# Patient Record
Sex: FEMALE | Race: BLACK OR AFRICAN AMERICAN | NOT HISPANIC OR LATINO | Employment: FULL TIME | URBAN - METROPOLITAN AREA
[De-identification: names, ages, dates, MRNs, and addresses within clinical notes are randomized per-mention and may not be internally consistent; named-entity substitution may affect disease eponyms.]

---

## 2017-06-28 DIAGNOSIS — Z12.11 COLON CANCER SCREENING: ICD-10-CM

## 2018-06-22 ENCOUNTER — OFFICE VISIT (OUTPATIENT)
Dept: INTERNAL MEDICINE | Facility: CLINIC | Age: 63
End: 2018-06-22
Payer: COMMERCIAL

## 2018-06-22 VITALS
HEART RATE: 66 BPM | OXYGEN SATURATION: 97 % | BODY MASS INDEX: 26.74 KG/M2 | HEIGHT: 65 IN | SYSTOLIC BLOOD PRESSURE: 122 MMHG | WEIGHT: 160.5 LBS | DIASTOLIC BLOOD PRESSURE: 78 MMHG

## 2018-06-22 DIAGNOSIS — L50.9 URTICARIA: ICD-10-CM

## 2018-06-22 PROCEDURE — 3008F BODY MASS INDEX DOCD: CPT | Mod: CPTII,S$GLB,, | Performed by: NURSE PRACTITIONER

## 2018-06-22 PROCEDURE — 99213 OFFICE O/P EST LOW 20 MIN: CPT | Mod: 25,S$GLB,, | Performed by: NURSE PRACTITIONER

## 2018-06-22 PROCEDURE — 99999 PR PBB SHADOW E&M-EST. PATIENT-LVL III: CPT | Mod: PBBFAC,,, | Performed by: NURSE PRACTITIONER

## 2018-06-22 PROCEDURE — 96372 THER/PROPH/DIAG INJ SC/IM: CPT | Mod: S$GLB,,, | Performed by: NURSE PRACTITIONER

## 2018-06-22 RX ORDER — BETAMETHASONE SODIUM PHOSPHATE AND BETAMETHASONE ACETATE 3; 3 MG/ML; MG/ML
6 INJECTION, SUSPENSION INTRA-ARTICULAR; INTRALESIONAL; INTRAMUSCULAR; SOFT TISSUE
Status: COMPLETED | OUTPATIENT
Start: 2018-06-22 | End: 2018-06-22

## 2018-06-22 RX ORDER — TRIAMCINOLONE ACETONIDE 1 MG/G
CREAM TOPICAL 2 TIMES DAILY
Qty: 80 G | Refills: 0 | Status: SHIPPED | OUTPATIENT
Start: 2018-06-22 | End: 2019-10-08

## 2018-06-22 RX ADMIN — BETAMETHASONE SODIUM PHOSPHATE AND BETAMETHASONE ACETATE 6 MG: 3; 3 INJECTION, SUSPENSION INTRA-ARTICULAR; INTRALESIONAL; INTRAMUSCULAR; SOFT TISSUE at 08:06

## 2018-06-22 NOTE — PROGRESS NOTES
"INTERNAL MEDICINE PROGRESS/URGENT CARE NOTE    CHIEF COMPLAINT     Chief Complaint   Patient presents with    Rash     x3 days        HPI     Liz Cheryl Coto is a 62 y.o. female who presents for an urgent visit today.    Rash to the bilateral arms, thorax, upper back, legs and neck x 3 days. Used new detergent pods. No new medicines. No new foods. No recent travel.     Rash is mildly itchy. Irritated by clothing. Treated with cortisone cream.       Past Medical History:  History reviewed. No pertinent past medical history.    Home Medications:  Prior to Admission medications    Medication Sig Start Date End Date Taking? Authorizing Provider   levocetirizine (XYZAL) 5 MG tablet Take 1 tablet (5 mg total) by mouth every evening. For Allergies. 6/28/16  Yes Griselda Alegre MD   peg-electrolyte soln (TRILYTE WITH FLAVOR PACKETS) 420 gram SolR Take 4,000 mLs by mouth as directed. 6/29/16   Moses Sibley MD       Review of Systems:  Review of Systems   Constitutional: Negative for chills and fever.   Respiratory: Negative for cough, chest tightness, shortness of breath and wheezing.    Cardiovascular: Negative for chest pain and palpitations.   Skin: Positive for rash.   Neurological: Negative for dizziness, light-headedness and headaches.       Health Maintainence:   Immunizations:  Health Maintenance       Date Due Completion Date    TETANUS VACCINE 08/10/1975 ---    Colonoscopy 08/10/2007 ---    Zoster Vaccine 08/10/2017 ---    Influenza Vaccine 08/01/2018 ---    Mammogram 08/23/2018 8/23/2016    Pap Smear with HPV Cotest 08/22/2019 8/22/2016    Lipid Panel 06/28/2021 6/28/2016           PHYSICAL EXAM     /78 (BP Location: Left arm, Patient Position: Sitting, BP Method: Large (Manual))   Pulse 66   Ht 5' 5" (1.651 m)   Wt 72.8 kg (160 lb 7.9 oz)   SpO2 97%   BMI 26.71 kg/m²     Physical Exam   Constitutional: She is oriented to person, place, and time. She appears well-developed and " well-nourished.   HENT:   Head: Normocephalic and atraumatic.   Eyes: Pupils are equal, round, and reactive to light.   Cardiovascular: Normal rate and regular rhythm.    Pulmonary/Chest: Effort normal.   Neurological: She is alert and oriented to person, place, and time.   Skin: Skin is warm and dry. Capillary refill takes less than 2 seconds. Rash noted. Rash is urticarial.        Psychiatric: She has a normal mood and affect.       LABS     No results found for: LABA1C, HGBA1C  CMP  Sodium   Date Value Ref Range Status   06/28/2016 142 136 - 145 mmol/L Final     Potassium   Date Value Ref Range Status   06/28/2016 3.9 3.5 - 5.1 mmol/L Final     Chloride   Date Value Ref Range Status   06/28/2016 104 95 - 110 mmol/L Final     CO2   Date Value Ref Range Status   06/28/2016 28 23 - 29 mmol/L Final     Glucose   Date Value Ref Range Status   06/28/2016 84 70 - 110 mg/dL Final     BUN, Bld   Date Value Ref Range Status   06/28/2016 10 6 - 20 mg/dL Final     Creatinine   Date Value Ref Range Status   06/28/2016 1.0 0.5 - 1.4 mg/dL Final     Calcium   Date Value Ref Range Status   06/28/2016 9.6 8.7 - 10.5 mg/dL Final     Total Protein   Date Value Ref Range Status   06/28/2016 6.8 6.0 - 8.4 g/dL Final     Albumin   Date Value Ref Range Status   06/28/2016 3.5 3.5 - 5.2 g/dL Final     Total Bilirubin   Date Value Ref Range Status   06/28/2016 0.4 0.1 - 1.0 mg/dL Final     Comment:     For infants and newborns, interpretation of results should be based  on gestational age, weight and in agreement with clinical  observations.  Premature Infant recommended reference ranges:  Up to 24 hours.............<8.0 mg/dL  Up to 48 hours............<12.0 mg/dL  3-5 days..................<15.0 mg/dL  6-29 days.................<15.0 mg/dL       Alkaline Phosphatase   Date Value Ref Range Status   06/28/2016 66 55 - 135 U/L Final     AST   Date Value Ref Range Status   06/28/2016 18 10 - 40 U/L Final     ALT   Date Value Ref Range  Status   06/28/2016 9 (L) 10 - 44 U/L Final     Anion Gap   Date Value Ref Range Status   06/28/2016 10 8 - 16 mmol/L Final     eGFR if    Date Value Ref Range Status   06/28/2016 >60.0 >60 mL/min/1.73 m^2 Final     eGFR if non    Date Value Ref Range Status   06/28/2016 >60.0 >60 mL/min/1.73 m^2 Final     Comment:     Calculation used to obtain the estimated glomerular filtration  rate (eGFR) is the CKD-EPI equation. Since race is unknown   in our information system, the eGFR values for   -American and Non--American patients are given   for each creatinine result.       Lab Results   Component Value Date    WBC 5.22 06/28/2016    HGB 13.4 06/28/2016    HCT 39.9 06/28/2016    MCV 90 06/28/2016     06/28/2016     Lab Results   Component Value Date    CHOL 239 (H) 06/28/2016     Lab Results   Component Value Date    HDL 71 06/28/2016     Lab Results   Component Value Date    LDLCALC 154.8 06/28/2016     Lab Results   Component Value Date    TRIG 66 06/28/2016     Lab Results   Component Value Date    CHOLHDL 29.7 06/28/2016     No results found for: TSH, U5QPZZS, K0TYFMM, THYROIDAB    ASSESSMENT/PLAN     Liz Cheryl Coto is a 62 y.o. female with  History reviewed. No pertinent past medical history.    Urticaria- steroid shot IM in office. May use antihistamines as needed for itching and rash. Steroid cream to the rash BID. Avoid detergents   -     betamethasone acetate-betamethasone sodium phosphate injection 6 mg; Inject 1 mL (6 mg total) into the muscle one time.  -     triamcinolone acetonide 0.1% (KENALOG) 0.1 % cream; Apply topically 2 (two) times daily. for 10 days  Dispense: 80 g; Refill: 0        Follow up as needed and with PCP     Patient education provided from Main. Patient was counseled on when and how to seek emergent care.       Tania PATTERSON, APRN, FNP-c   Department of Internal Medicine - [facility]  8:23 AM

## 2019-10-04 ENCOUNTER — HOSPITAL ENCOUNTER (EMERGENCY)
Facility: HOSPITAL | Age: 64
Discharge: HOME | End: 2019-10-05
Attending: INTERNAL MEDICINE
Payer: COMMERCIAL

## 2019-10-04 DIAGNOSIS — R19.00 PELVIC MASS IN FEMALE: ICD-10-CM

## 2019-10-04 DIAGNOSIS — A08.4 VIRAL GASTROENTERITIS: Primary | ICD-10-CM

## 2019-10-04 LAB
ALBUMIN SERPL-MCNC: 3.4 G/DL (ref 3.3–5.5)
ALBUMIN SERPL-MCNC: 3.6 G/DL (ref 3.3–5.5)
ALP SERPL-CCNC: 68 U/L (ref 42–141)
ALP SERPL-CCNC: 71 U/L (ref 42–141)
BILIRUB SERPL-MCNC: 0.9 MG/DL (ref 0.2–1.6)
BILIRUB SERPL-MCNC: 1 MG/DL (ref 0.2–1.6)
BUN SERPL-MCNC: 11 MG/DL (ref 7–22)
CALCIUM SERPL-MCNC: 9.3 MG/DL (ref 8–10.3)
CHLORIDE SERPL-SCNC: 105 MMOL/L (ref 98–108)
CREAT SERPL-MCNC: 0.8 MG/DL (ref 0.6–1.2)
GLUCOSE SERPL-MCNC: 101 MG/DL (ref 73–118)
POC ALT (SGPT): 12 U/L (ref 10–47)
POC ALT (SGPT): 13 U/L (ref 10–47)
POC AMYLASE: 47 U/L (ref 14–97)
POC AST (SGOT): 22 U/L (ref 11–38)
POC AST (SGOT): 23 U/L (ref 11–38)
POC GGT: 11 U/L (ref 5–65)
POC TCO2: 30 MMOL/L (ref 18–33)
POTASSIUM BLD-SCNC: 3.6 MMOL/L (ref 3.6–5.1)
PROTEIN, POC: 6.6 G/DL (ref 6.4–8.1)
PROTEIN, POC: 6.7 G/DL (ref 6.4–8.1)
SODIUM BLD-SCNC: 139 MMOL/L (ref 128–145)

## 2019-10-04 PROCEDURE — 96361 HYDRATE IV INFUSION ADD-ON: CPT | Mod: ER

## 2019-10-04 PROCEDURE — 99285 EMERGENCY DEPT VISIT HI MDM: CPT | Mod: 25,ER

## 2019-10-04 PROCEDURE — 96374 THER/PROPH/DIAG INJ IV PUSH: CPT | Mod: ER

## 2019-10-04 PROCEDURE — 96375 TX/PRO/DX INJ NEW DRUG ADDON: CPT | Mod: ER

## 2019-10-04 PROCEDURE — 63600175 PHARM REV CODE 636 W HCPCS: Mod: ER | Performed by: INTERNAL MEDICINE

## 2019-10-04 PROCEDURE — 25000003 PHARM REV CODE 250: Mod: ER | Performed by: INTERNAL MEDICINE

## 2019-10-04 PROCEDURE — S0028 INJECTION, FAMOTIDINE, 20 MG: HCPCS | Mod: ER | Performed by: INTERNAL MEDICINE

## 2019-10-04 RX ORDER — FAMOTIDINE 10 MG/ML
20 INJECTION INTRAVENOUS
Status: COMPLETED | OUTPATIENT
Start: 2019-10-04 | End: 2019-10-04

## 2019-10-04 RX ORDER — ONDANSETRON 2 MG/ML
8 INJECTION INTRAMUSCULAR; INTRAVENOUS
Status: COMPLETED | OUTPATIENT
Start: 2019-10-04 | End: 2019-10-04

## 2019-10-04 RX ORDER — SODIUM CHLORIDE 9 MG/ML
1000 INJECTION, SOLUTION INTRAVENOUS ONCE
Status: COMPLETED | OUTPATIENT
Start: 2019-10-05 | End: 2019-10-05

## 2019-10-04 RX ADMIN — ONDANSETRON 8 MG: 2 INJECTION INTRAMUSCULAR; INTRAVENOUS at 11:10

## 2019-10-04 RX ADMIN — SODIUM CHLORIDE 1000 ML: 0.9 INJECTION, SOLUTION INTRAVENOUS at 11:10

## 2019-10-04 RX ADMIN — FAMOTIDINE 20 MG: 10 INJECTION INTRAVENOUS at 11:10

## 2019-10-05 VITALS
TEMPERATURE: 99 F | OXYGEN SATURATION: 98 % | DIASTOLIC BLOOD PRESSURE: 86 MMHG | RESPIRATION RATE: 16 BRPM | BODY MASS INDEX: 21.97 KG/M2 | SYSTOLIC BLOOD PRESSURE: 138 MMHG | HEART RATE: 88 BPM | WEIGHT: 132 LBS

## 2019-10-05 PROBLEM — R19.00 PELVIC MASS IN FEMALE: Status: ACTIVE | Noted: 2019-10-05

## 2019-10-05 PROBLEM — A08.4 VIRAL GASTROENTERITIS: Status: ACTIVE | Noted: 2019-10-05

## 2019-10-05 PROCEDURE — 82150 ASSAY OF AMYLASE: CPT | Mod: ER

## 2019-10-05 PROCEDURE — 25500020 PHARM REV CODE 255: Mod: ER | Performed by: INTERNAL MEDICINE

## 2019-10-05 PROCEDURE — 63600175 PHARM REV CODE 636 W HCPCS: Mod: ER | Performed by: INTERNAL MEDICINE

## 2019-10-05 PROCEDURE — 85025 COMPLETE CBC W/AUTO DIFF WBC: CPT | Mod: ER

## 2019-10-05 PROCEDURE — 80053 COMPREHEN METABOLIC PANEL: CPT | Mod: ER

## 2019-10-05 RX ORDER — ONDANSETRON 4 MG/1
4 TABLET, FILM COATED ORAL EVERY 6 HOURS PRN
Qty: 12 TABLET | Refills: 0 | Status: SHIPPED | OUTPATIENT
Start: 2019-10-05 | End: 2019-10-22

## 2019-10-05 RX ADMIN — IOHEXOL 75 ML: 350 INJECTION, SOLUTION INTRAVENOUS at 12:10

## 2019-10-05 NOTE — ED PROVIDER NOTES
Encounter Date: 10/4/2019    SCRIBE #1 NOTE: I, Raissa Sheehan, am scribing for, and in the presence of,  Dr. Llanes. I have scribed the following portions of the note - Other sections scribed: HPI, ROS, PE.       History     Chief Complaint   Patient presents with    Abdominal Pain     x's 2 days to midline abdomen. Pt reports a bout of nausea last night. Pt reports taking tylenol at apprx 1500 today with relief.      Liz Coto is a 64 y.o. female who presents to the ED complaining of pain and tenderness in her abdomen starting at 2 am this morning. Pain increases with movement. Reports vomiting once but nauseous currently. Denies diarrhea, reports normal BM this morning.  2 am V  No D normal bm this morning.     The history is provided by the patient.     Review of patient's allergies indicates:  No Known Allergies  History reviewed. No pertinent past medical history.  Past Surgical History:   Procedure Laterality Date    ECTOPIC PREGNANCY SURGERY      TONSILLECTOMY       Family History   Problem Relation Age of Onset    Thyroid disease Mother     Lupus Father     Breast cancer Maternal Grandmother      Social History     Tobacco Use    Smoking status: Current Every Day Smoker     Packs/day: 0.25     Types: Cigarettes   Substance Use Topics    Alcohol use: Yes     Alcohol/week: 2.0 standard drinks     Types: 2 Cans of beer per week     Comment: socially    Drug use: No     Review of Systems   Constitutional: Negative for fever.   HENT: Negative for sore throat.    Respiratory: Negative for shortness of breath.    Cardiovascular: Negative for chest pain.   Gastrointestinal: Positive for abdominal pain, nausea and vomiting. Negative for diarrhea.   Genitourinary: Negative for dysuria.   Musculoskeletal: Negative for back pain.   Skin: Negative for rash.   Neurological: Negative for weakness.   Hematological: Does not bruise/bleed easily.   All other systems reviewed and are  negative.      Physical Exam     Initial Vitals [10/04/19 2239]   BP Pulse Resp Temp SpO2   (!) 123/96 92 18 97.8 °F (36.6 °C) 95 %      MAP       --         Physical Exam    Nursing note and vitals reviewed.  Constitutional: She appears well-developed and well-nourished.   HENT:   Head: Normocephalic and atraumatic.   Eyes: Conjunctivae are normal.   Neck: Normal range of motion. Neck supple.   Cardiovascular: Normal rate, regular rhythm and normal heart sounds. Exam reveals no gallop and no friction rub.    No murmur heard.  Pulmonary/Chest: Breath sounds normal. No respiratory distress. She has no wheezes. She has no rhonchi. She has no rales.   Abdominal: Soft. There is generalized tenderness.   Generalized tenderness in abdomen. No peritoneal signs   Musculoskeletal: Normal range of motion.   Neurological: She is alert and oriented to person, place, and time. GCS score is 15. GCS eye subscore is 4. GCS verbal subscore is 5. GCS motor subscore is 6.   Skin: Skin is warm and dry.   Psychiatric: She has a normal mood and affect.         ED Course   Procedures  Labs Reviewed   POCT CBC   POCT CMP   POCT LIVER PANEL   POCT CMP   POCT LIVER PANEL              Imaging Results          CT Abdomen Pelvis With Contrast (Final result)  Result time 10/05/19 01:28:36    Final result by Mary Fisher MD (10/05/19 01:28:36)                 Impression:      Thick-walled appearance of the duodenum and proximal jejunum.  Findings may be related to enteritis with reactive small abdominal and pelvic ascites.    Complex pelvic mass.  Consider repeat imaging with oral contrast,213 pelvic ultrasound, MR pelvis and or gynecological consult if warranted.    Probable tiny right renal cyst.      Electronically signed by: Mary Fisher  Date:    10/05/2019  Time:    01:28             Narrative:    EXAMINATION:  CT OF ABDOMEN PELVIS WITH    CLINICAL HISTORY:  Nausea, vomiting, diarrhea;    TECHNIQUE:  5 mm enhanced axial images  were obtained from the lung bases through the greater trochanters.   mL of Omnipaque 350 was injected.    COMPARISON:  None.    FINDINGS:  The liver, spleen, pancreas, kidneys, and adrenal glands are unremarkable. The gallbladder .  A too small to characterize low attenuation lesion is seen in the right renal cortex, which may be a tiny cyst (series 2 axial image 87).    There is a thick-walled appearance of the proximal arm jejunum and duodenal bowel loops.  On lung    There is no definite evidence the small abdominal ascites.    A complex heterogeneous pelvic mass is seen predominantly in the mid upper pelvis.  This mass and indents the cephalad portion of the urinary bladder.  A soft tissue mass seen within the presumed mass (series 2 axial image 60).  There Is a uterine fibroid.  There is small free fluid in the pelvis.  The appendix is not inflamed.    There is mild bibasilar atelectasis'                                 Medical Decision Making:   Initial Assessment:   Liz Coto is a 64 y.o. female who presents to the ED complaining of pain and tenderness in her abdomen starting at 2 am this morning.  Clinical Tests:   Lab Tests: Ordered and Reviewed  ED Management:  CT reveals signs of enteritis as well as a pelvic mass. Patient was given results and advised to follow up with her primary care physician for further MRI or ultrasound evaluation of pelvic mass. She received Zofran in the emergency department as well as Pepcid and saline bolus and states she feels much better after fluids and medications.  Instructions for gastroenteritis were given as well as a prescription for Zofran.            Scribe Attestation:   Scribe #1: I performed the above scribed service and the documentation accurately describes the services I performed. I attest to the accuracy of the note.    This document was produced by a scribe under my direction and in my presence. I agree with the content of the note and have made  any necessary edits.     Dr. Llanes    10/05/2019 6:00 AM             Clinical Impression:     1. Viral gastroenteritis    2. Pelvic mass in female            Disposition:   Disposition: Discharged  Condition: Stable                        Jett Llanes MD  10/05/19 0601

## 2019-10-08 ENCOUNTER — TELEPHONE (OUTPATIENT)
Dept: PRIMARY CARE CLINIC | Facility: CLINIC | Age: 64
End: 2019-10-08

## 2019-10-08 ENCOUNTER — HOSPITAL ENCOUNTER (OUTPATIENT)
Dept: RADIOLOGY | Facility: HOSPITAL | Age: 64
Discharge: HOME | End: 2019-10-08
Attending: INTERNAL MEDICINE
Payer: COMMERCIAL

## 2019-10-08 ENCOUNTER — LAB VISIT (OUTPATIENT)
Dept: LAB | Facility: HOSPITAL | Age: 64
End: 2019-10-08
Attending: INTERNAL MEDICINE
Payer: COMMERCIAL

## 2019-10-08 ENCOUNTER — OFFICE VISIT (OUTPATIENT)
Dept: PRIMARY CARE CLINIC | Facility: CLINIC | Age: 64
End: 2019-10-08
Payer: COMMERCIAL

## 2019-10-08 VITALS
HEIGHT: 65 IN | HEART RATE: 80 BPM | BODY MASS INDEX: 22.41 KG/M2 | DIASTOLIC BLOOD PRESSURE: 96 MMHG | SYSTOLIC BLOOD PRESSURE: 122 MMHG | TEMPERATURE: 98 F | WEIGHT: 134.5 LBS

## 2019-10-08 DIAGNOSIS — R19.00 PELVIC MASS: Primary | ICD-10-CM

## 2019-10-08 DIAGNOSIS — R63.4 WEIGHT LOSS, UNINTENTIONAL: ICD-10-CM

## 2019-10-08 DIAGNOSIS — N94.89 ADNEXAL MASS: ICD-10-CM

## 2019-10-08 DIAGNOSIS — R19.00 PELVIC MASS: ICD-10-CM

## 2019-10-08 DIAGNOSIS — K52.9 ENTERITIS: ICD-10-CM

## 2019-10-08 DIAGNOSIS — R97.1 ELEVATED CA-125: ICD-10-CM

## 2019-10-08 LAB
ANION GAP SERPL CALC-SCNC: 10 MMOL/L (ref 8–16)
BASOPHILS # BLD AUTO: 0.02 K/UL (ref 0–0.2)
BASOPHILS NFR BLD: 0.4 % (ref 0–1.9)
BILIRUB UR QL STRIP: NEGATIVE
BUN SERPL-MCNC: 6 MG/DL (ref 8–23)
CALCIUM SERPL-MCNC: 9.8 MG/DL (ref 8.7–10.5)
CANCER AG125 SERPL-ACNC: 1097 U/ML (ref 0–30)
CEA SERPL-MCNC: 2.8 NG/ML (ref 0–5)
CHLORIDE SERPL-SCNC: 100 MMOL/L (ref 95–110)
CLARITY UR REFRACT.AUTO: CLEAR
CO2 SERPL-SCNC: 27 MMOL/L (ref 23–29)
COLOR UR AUTO: YELLOW
CREAT SERPL-MCNC: 0.8 MG/DL (ref 0.5–1.4)
DIFFERENTIAL METHOD: NORMAL
EOSINOPHIL # BLD AUTO: 0.1 K/UL (ref 0–0.5)
EOSINOPHIL NFR BLD: 1.7 % (ref 0–8)
ERYTHROCYTE [DISTWIDTH] IN BLOOD BY AUTOMATED COUNT: 14.1 % (ref 11.5–14.5)
EST. GFR  (AFRICAN AMERICAN): >60 ML/MIN/1.73 M^2
EST. GFR  (NON AFRICAN AMERICAN): >60 ML/MIN/1.73 M^2
ESTIMATED AVG GLUCOSE: 108 MG/DL (ref 68–131)
GLUCOSE SERPL-MCNC: 82 MG/DL (ref 70–110)
GLUCOSE UR QL STRIP: NEGATIVE
HBA1C MFR BLD HPLC: 5.4 % (ref 4–5.6)
HCT VFR BLD AUTO: 41.1 % (ref 37–48.5)
HGB BLD-MCNC: 13.2 G/DL (ref 12–16)
HGB UR QL STRIP: ABNORMAL
HYALINE CASTS UR QL AUTO: 5 /LPF
IMM GRANULOCYTES # BLD AUTO: 0.01 K/UL (ref 0–0.04)
IMM GRANULOCYTES NFR BLD AUTO: 0.2 % (ref 0–0.5)
KETONES UR QL STRIP: ABNORMAL
LEUKOCYTE ESTERASE UR QL STRIP: NEGATIVE
LYMPHOCYTES # BLD AUTO: 1.9 K/UL (ref 1–4.8)
LYMPHOCYTES NFR BLD: 35.4 % (ref 18–48)
MCH RBC QN AUTO: 29.9 PG (ref 27–31)
MCHC RBC AUTO-ENTMCNC: 32.1 G/DL (ref 32–36)
MCV RBC AUTO: 93 FL (ref 82–98)
MICROSCOPIC COMMENT: ABNORMAL
MONOCYTES # BLD AUTO: 0.5 K/UL (ref 0.3–1)
MONOCYTES NFR BLD: 9.3 % (ref 4–15)
NEUTROPHILS # BLD AUTO: 2.9 K/UL (ref 1.8–7.7)
NEUTROPHILS NFR BLD: 53 % (ref 38–73)
NITRITE UR QL STRIP: NEGATIVE
NRBC BLD-RTO: 0 /100 WBC
PH UR STRIP: 6 [PH] (ref 5–8)
PLATELET # BLD AUTO: 322 K/UL (ref 150–350)
PMV BLD AUTO: 11 FL (ref 9.2–12.9)
POTASSIUM SERPL-SCNC: 3.7 MMOL/L (ref 3.5–5.1)
PROT UR QL STRIP: NEGATIVE
RBC # BLD AUTO: 4.41 M/UL (ref 4–5.4)
RBC #/AREA URNS AUTO: 14 /HPF (ref 0–4)
SODIUM SERPL-SCNC: 137 MMOL/L (ref 136–145)
SP GR UR STRIP: 1.02 (ref 1–1.03)
SQUAMOUS #/AREA URNS AUTO: 1 /HPF
TSH SERPL DL<=0.005 MIU/L-ACNC: 2.68 UIU/ML (ref 0.4–4)
URN SPEC COLLECT METH UR: ABNORMAL
WBC # BLD AUTO: 5.39 K/UL (ref 3.9–12.7)
WBC #/AREA URNS AUTO: 1 /HPF (ref 0–5)

## 2019-10-08 PROCEDURE — 86304 IMMUNOASSAY TUMOR CA 125: CPT

## 2019-10-08 PROCEDURE — 36415 COLL VENOUS BLD VENIPUNCTURE: CPT | Mod: PN

## 2019-10-08 PROCEDURE — 25500020 PHARM REV CODE 255: Performed by: INTERNAL MEDICINE

## 2019-10-08 PROCEDURE — 80048 BASIC METABOLIC PNL TOTAL CA: CPT

## 2019-10-08 PROCEDURE — A9585 GADOBUTROL INJECTION: HCPCS | Performed by: INTERNAL MEDICINE

## 2019-10-08 PROCEDURE — 3008F BODY MASS INDEX DOCD: CPT | Mod: CPTII,S$GLB,, | Performed by: INTERNAL MEDICINE

## 2019-10-08 PROCEDURE — 3008F PR BODY MASS INDEX (BMI) DOCUMENTED: ICD-10-PCS | Mod: CPTII,S$GLB,, | Performed by: INTERNAL MEDICINE

## 2019-10-08 PROCEDURE — 99214 OFFICE O/P EST MOD 30 MIN: CPT | Mod: S$GLB,,, | Performed by: INTERNAL MEDICINE

## 2019-10-08 PROCEDURE — 72197 MRI PELVIS W/O & W/DYE: CPT | Mod: 26,,, | Performed by: RADIOLOGY

## 2019-10-08 PROCEDURE — 99999 PR PBB SHADOW E&M-EST. PATIENT-LVL IV: ICD-10-PCS | Mod: PBBFAC,,, | Performed by: INTERNAL MEDICINE

## 2019-10-08 PROCEDURE — 99214 PR OFFICE/OUTPT VISIT, EST, LEVL IV, 30-39 MIN: ICD-10-PCS | Mod: S$GLB,,, | Performed by: INTERNAL MEDICINE

## 2019-10-08 PROCEDURE — 72197 MRI PELVIS W WO CONTRAST: ICD-10-PCS | Mod: 26,,, | Performed by: RADIOLOGY

## 2019-10-08 PROCEDURE — 81001 URINALYSIS AUTO W/SCOPE: CPT

## 2019-10-08 PROCEDURE — 85025 COMPLETE CBC W/AUTO DIFF WBC: CPT

## 2019-10-08 PROCEDURE — 82378 CARCINOEMBRYONIC ANTIGEN: CPT

## 2019-10-08 PROCEDURE — 84443 ASSAY THYROID STIM HORMONE: CPT

## 2019-10-08 PROCEDURE — 83036 HEMOGLOBIN GLYCOSYLATED A1C: CPT

## 2019-10-08 PROCEDURE — 72197 MRI PELVIS W/O & W/DYE: CPT | Mod: TC

## 2019-10-08 PROCEDURE — 99999 PR PBB SHADOW E&M-EST. PATIENT-LVL IV: CPT | Mod: PBBFAC,,, | Performed by: INTERNAL MEDICINE

## 2019-10-08 RX ORDER — GADOBUTROL 604.72 MG/ML
10 INJECTION INTRAVENOUS
Status: COMPLETED | OUTPATIENT
Start: 2019-10-08 | End: 2019-10-08

## 2019-10-08 RX ADMIN — GADOBUTROL 10 ML: 604.72 INJECTION INTRAVENOUS at 12:10
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
## 2019-10-09 NOTE — TELEPHONE ENCOUNTER
I will call patient first.     Urine has a trace of blood with no evidence of infection, this may not be significant.   Blood tests are all normal except the tumor marker for Ovarian Cancer is high - normal is 0-30, her result is 1,097.  MRI of the Pelvis shows the mass localizes to the area of the right ovary, approximately 4 inches in size. It does not appear to involve the intestines, uterus or bladder. Refer to Gyn/Oncology.     No answer at either number this morning, left voicemail for her to call back. Please keep trying and connect me when she answers.     Patient called back, results given. She is awaiting Gyn/ONC appointment.

## 2019-10-10 ENCOUNTER — HOSPITAL ENCOUNTER (OUTPATIENT)
Dept: CARDIOLOGY | Facility: CLINIC | Age: 64
Discharge: HOME | End: 2019-10-10
Payer: COMMERCIAL

## 2019-10-10 ENCOUNTER — INITIAL CONSULT (OUTPATIENT)
Dept: GYNECOLOGIC ONCOLOGY | Facility: CLINIC | Age: 64
End: 2019-10-10
Payer: COMMERCIAL

## 2019-10-10 ENCOUNTER — HOSPITAL ENCOUNTER (OUTPATIENT)
Dept: RADIOLOGY | Facility: HOSPITAL | Age: 64
Discharge: HOME | End: 2019-10-10
Attending: OBSTETRICS & GYNECOLOGY
Payer: COMMERCIAL

## 2019-10-10 ENCOUNTER — TELEPHONE (OUTPATIENT)
Dept: GYNECOLOGIC ONCOLOGY | Facility: CLINIC | Age: 64
End: 2019-10-10

## 2019-10-10 VITALS
HEIGHT: 65 IN | BODY MASS INDEX: 22.63 KG/M2 | SYSTOLIC BLOOD PRESSURE: 127 MMHG | DIASTOLIC BLOOD PRESSURE: 87 MMHG | HEART RATE: 82 BPM | WEIGHT: 135.81 LBS

## 2019-10-10 DIAGNOSIS — R19.00 PELVIC MASS IN FEMALE: Primary | ICD-10-CM

## 2019-10-10 DIAGNOSIS — R19.00 PELVIC MASS IN FEMALE: ICD-10-CM

## 2019-10-10 DIAGNOSIS — R97.1 ELEVATED CANCER ANTIGEN 125 (CA-125): ICD-10-CM

## 2019-10-10 PROCEDURE — 93005 EKG 12-LEAD: ICD-10-PCS | Mod: S$GLB,,, | Performed by: OBSTETRICS & GYNECOLOGY

## 2019-10-10 PROCEDURE — 93010 ELECTROCARDIOGRAM REPORT: CPT | Mod: S$GLB,,, | Performed by: INTERNAL MEDICINE

## 2019-10-10 PROCEDURE — 99205 OFFICE O/P NEW HI 60 MIN: CPT | Mod: S$GLB,,, | Performed by: OBSTETRICS & GYNECOLOGY

## 2019-10-10 PROCEDURE — 3008F BODY MASS INDEX DOCD: CPT | Mod: CPTII,S$GLB,, | Performed by: OBSTETRICS & GYNECOLOGY

## 2019-10-10 PROCEDURE — 99205 PR OFFICE/OUTPT VISIT, NEW, LEVL V, 60-74 MIN: ICD-10-PCS | Mod: S$GLB,,, | Performed by: OBSTETRICS & GYNECOLOGY

## 2019-10-10 PROCEDURE — 71046 X-RAY EXAM CHEST 2 VIEWS: CPT | Mod: 26,,, | Performed by: RADIOLOGY

## 2019-10-10 PROCEDURE — 93010 EKG 12-LEAD: ICD-10-PCS | Mod: S$GLB,,, | Performed by: INTERNAL MEDICINE

## 2019-10-10 PROCEDURE — 99999 PR PBB SHADOW E&M-EST. PATIENT-LVL III: ICD-10-PCS | Mod: PBBFAC,,, | Performed by: OBSTETRICS & GYNECOLOGY

## 2019-10-10 PROCEDURE — 71046 X-RAY EXAM CHEST 2 VIEWS: CPT | Mod: TC

## 2019-10-10 PROCEDURE — 93005 ELECTROCARDIOGRAM TRACING: CPT | Mod: S$GLB,,, | Performed by: OBSTETRICS & GYNECOLOGY

## 2019-10-10 PROCEDURE — 3008F PR BODY MASS INDEX (BMI) DOCUMENTED: ICD-10-PCS | Mod: CPTII,S$GLB,, | Performed by: OBSTETRICS & GYNECOLOGY

## 2019-10-10 PROCEDURE — 71046 XR CHEST PA AND LATERAL: ICD-10-PCS | Mod: 26,,, | Performed by: RADIOLOGY

## 2019-10-10 PROCEDURE — 99999 PR PBB SHADOW E&M-EST. PATIENT-LVL III: CPT | Mod: PBBFAC,,, | Performed by: OBSTETRICS & GYNECOLOGY

## 2019-10-10 NOTE — LETTER
October 13, 2019      Griselda Alegre MD  [address]  [facility] [address]           [facility] - GYN Oncology  [address]  [place] [address]  Phone: [phone]          Patient: Liz Coto   MR Number: [id number]   YOB: 1955   Date of Visit: 10/10/2019       Dear Dr. Griselda Alegre:    Thank you for referring Liz Coto to me for evaluation. Attached you will find relevant portions of my assessment and plan of care.    If you have questions, please do not hesitate to call me. I look forward to following Liz Coto along with you.    Sincerely,    Pérez Shukla MD    Enclosure  CC:  No Recipients    If you would like to receive this communication electronically, please contact [email or web address] or [phone] to request more information on [employer] access.    For providers and/or their staff who would like to refer a patient to [facility], please contact us through our one-stop-shop provider referral line, Helen Drew, at [phone].    If you feel you have received this communication in error or would no longer like to receive these types of communications, please e-mail [email or web address]

## 2019-10-10 NOTE — PROGRESS NOTES
Subjective:       Patient ID: Liz Coto is a 64 y.o. female.    Chief Complaint: Advice Only (Dr. Shaw ); elevated ca 125; and adnexal mass    HPI   Referring physician:Dr. Griselda Alegre.   Reason for referral: pelvic mass     10/5/2019: presented to [facility] ED with abdominal that woke her up. Had nausea. No fever, diarrhea. havign regular BMs.      CT scan :  Thick-walled appearance of the duodenum and proximal jejunum.  Findings may be related to enteritis with reactive small abdominal and pelvic ascites.    Complex pelvic mass.  Consider repeat imaging with oral contrast,213 pelvic ultrasound, MR pelvis and or gynecological consult if warranted.    MRI done by referring MD:  There is a solid-appearing rounded lesion in the right adnexa demonstrating mild enhancement measuring 2.7 x 3.0 x 2.7 cm (series 11, image 45).  There is a small amount of free fluid in the cul-de-sac.  The area is an adjacent large predominantly cystic appearing structure with mural enhancing nodularity measuring approximately 8.9 x 6.5 x 8.7 cm (series 11, image 31).  Findings are concerning for malignancy such as cystadenocarcinoma.    CEA:2.3  : 1097    Pain is better. No N/V at present.   Having early satiety. Didn't fill zofran rx from ED.     Past Medical History:   Diagnosis Date    Elevated cancer antigen 125 (CA-125) 10/10/2019     Past Surgical History:   Procedure Laterality Date    ECTOPIC PREGNANCY SURGERY      1981. thinks it was the left     TONSILLECTOMY       Family History   Problem Relation Age of Onset    Thyroid disease Mother     Lupus Father     Breast cancer Maternal Grandmother     Ovarian cancer Neg Hx     Uterine cancer Neg Hx     Colon cancer Neg Hx      Social History     Tobacco Use    Smoking status: Current Every Day Smoker     Packs/day: 0.25     Types: Cigarettes   Substance Use Topics    Alcohol use: Yes     Alcohol/week: 2.0 standard drinks     Types: 2 Cans of  "beer per week     Comment: socially    Drug use: No     Review of patient's allergies indicates:  No Known Allergies  No current outpatient medications on file.    Review of Systems   Constitutional: Negative for chills, fatigue and fever.   Respiratory: Negative for cough, shortness of breath and wheezing.    Cardiovascular: Negative for chest pain, palpitations and leg swelling.   Gastrointestinal: Negative for abdominal pain, constipation, diarrhea, nausea and vomiting.   Genitourinary: Negative for difficulty urinating, dysuria, frequency, genital sores, hematuria, urgency, vaginal bleeding, vaginal discharge and vaginal pain.   Neurological: Negative for weakness.   Hematological: Negative for adenopathy. Does not bruise/bleed easily.   Psychiatric/Behavioral: The patient is not nervous/anxious.        Objective:   /87   Pulse 82   Ht 5' 5" (1.651 m)   Wt 61.6 kg (135 lb 12.9 oz)   BMI 22.60 kg/m²      Physical Exam   Constitutional: She is oriented to person, place, and time. She appears well-developed and well-nourished.   HENT:   Head: Normocephalic and atraumatic.   Eyes: No scleral icterus.   Neck: Neck supple. No tracheal deviation present. No thyroid mass and no thyromegaly present.   Cardiovascular: Normal rate and regular rhythm.   Pulmonary/Chest: Effort normal and breath sounds normal. She has no wheezes.   Abdominal: Soft. She exhibits no distension and no mass. There is no hepatosplenomegaly. There is no tenderness. There is no rebound and no guarding.   Genitourinary:   Genitourinary Comments: Bimanual exam:  Vulva: no lesions. Normal appearance  Urethra: Normal size and location. No lesions  Bladder: No masses or tenderness.  Vagina: normal mucosa. No lesion  Cervix: normal  Uterus: not enlarged  Adnexa: fullness right adnexa  Rectovaginal: No posterior cul de sac thickening or nodularity.  Rectal: no masses. Nontender. Normal tone.      Musculoskeletal: She exhibits no edema or " tenderness.   Lymphadenopathy:     She has no cervical adenopathy.     She has no axillary adenopathy.        Right: No inguinal and no supraclavicular adenopathy present.        Left: No inguinal and no supraclavicular adenopathy present.   Neurological: She is alert and oriented to person, place, and time.   Skin: Skin is warm and dry.   Psychiatric: She has a normal mood and affect. Her behavior is normal. Judgment and thought content normal.       Assessment:       1. Pelvic mass in female    2. Elevated cancer antigen 125 (CA-125)        Plan:   Pelvic mass in female  I have recommended open HOLLY/BSO and staging as indicated by frozen section.     Consent forms were reviewed with patient.   Questions were answered. Patient voiced understanding. Consents were signed.      Preop orders placed. -     X-Ray Chest PA And Lateral; Future; Expected date: 10/10/2019  -     EKG 12-lead; Future    Elevated cancer antigen 125 (CA-125)

## 2019-10-13 RX ORDER — SODIUM CHLORIDE 0.9 % (FLUSH) 0.9 %
10 SYRINGE (ML) INJECTION
Status: CANCELLED | OUTPATIENT
Start: 2019-10-13

## 2019-10-13 RX ORDER — MUPIROCIN 20 MG/G
OINTMENT TOPICAL
Status: CANCELLED | OUTPATIENT
Start: 2019-10-13

## 2019-10-13 RX ORDER — LIDOCAINE HYDROCHLORIDE 10 MG/ML
1 INJECTION, SOLUTION EPIDURAL; INFILTRATION; INTRACAUDAL; PERINEURAL ONCE
Status: CANCELLED | OUTPATIENT
Start: 2019-10-13 | End: 2019-10-13

## 2019-10-16 ENCOUNTER — TELEPHONE (OUTPATIENT)
Dept: GYNECOLOGIC ONCOLOGY | Facility: CLINIC | Age: 64
End: 2019-10-16

## 2019-10-16 NOTE — TELEPHONE ENCOUNTER
----- Message from Sally Ochoa sent at 10/16/2019  8:26 AM CDT -----  Contact: Pt    Name of Who is Calling:CHANEL RICH [[id number]]    What is the request in detail: Patient would like a call back regarding FMLA paperwork Please contact to further discuss and advise    Can the clinic reply by [facility]: no    [facility] [phone]

## 2019-10-16 NOTE — TELEPHONE ENCOUNTER
Spoke with patient regarding FMLA paperwork inform patient that I have not received anything by fax as of today. Inform patient that she can contact Disability regarding FMLA paperwork. Patient voiced understanding. JUSTIN

## 2019-10-21 ENCOUNTER — TELEPHONE (OUTPATIENT)
Dept: PRIMARY CARE CLINIC | Facility: CLINIC | Age: 64
End: 2019-10-21

## 2019-10-21 NOTE — TELEPHONE ENCOUNTER
----- Message from Myla Muniz RN sent at 10/21/2019  2:24 PM CDT -----  Pt is scheduled for salpingo-oophorectomy, bilateral. Debulking neoplasm staging  Total abdominal hysterectomy by Dr. Shukla 11/4/19  Request medical clearance please            TYLER Muniz RN BC  Pre-op anesthesia

## 2019-10-22 ENCOUNTER — ANESTHESIA EVENT (OUTPATIENT)
Dept: SURGERY | Facility: HOSPITAL | Age: 64
DRG: 737 | End: 2019-10-22
Payer: COMMERCIAL

## 2019-10-22 DIAGNOSIS — Z01.818 PRE-OP TESTING: Primary | ICD-10-CM

## 2019-10-22 NOTE — PRE-PROCEDURE INSTRUCTIONS
MD Myla Montilla RN             She has appt 10/28 for surgical clearance with Dr Alegre    Previous Messages      ----- Message -----   From: Myla Muniz RN   Sent: 10/21/2019   2:24 PM CDT   To: Griselda Alegre MD, *     Pt is scheduled for salpingo-oophorectomy, bilateral. Debulking neoplasm staging   Total abdominal hysterectomy by Dr. Shukla 11/4/19   Request medical clearance please             TYLER Muniz RN BC   Pre-op anesthesia

## 2019-10-22 NOTE — PRE ADMISSION SCREENING
"Anesthesia Assessment: Preoperative EQUATION    Planned Procedure: Procedure(s) (LRB):  SALPINGO-OOPHORECTOMY, BILATERAL (Bilateral)  DEBULKING, NEOPLASM (N/A)  STAGING (N/A)  HYSTERECTOMY, TOTAL, ABDOMINAL (N/A)  Requested Anesthesia Type:General  Surgeon: Pérez Shukla MD  Service: OB/GYN  Known or anticipated Date of Surgery:11/4/2019    Surgeon notes: reviewed    Electronic QUestionnaire Assessment completed via nurse interview with patient.        "No Aq"      Triage considerations:     The patient has no apparent active cardiac condition (No unstable coronary Syndrome such as severe unstable angina or recent [<1 month] myocardial infarction, decompensated CHF, severe valvular   disease or significant arrhythmia)    Previous anesthesia records:Not available    Last PCP note: within 3 months , within [facility]  Subspecialty notes: gyn/ oncology    Other important co-morbidities:   Elevated cancer antigen     Tests already available:  Available tests,  within 1 month , within [facility] .            Instructions given. (See in Nurse's note)    Optimization:  Anesthesia Preop Clinic Assessment  Indicated    Medical Opinion Indicated           Plan:    Testing:  T&S   Pre-anesthesia  visit       Visit focus: concerns in complex and/or prolonged anesthesia     Consultation:Patient's PCP for a statement of optimization        Navigation: Tests Scheduled.              Consults scheduled.             Results will be tracked by Preop Clinic.                   "

## 2019-10-22 NOTE — ANESTHESIA PREPROCEDURE EVALUATION
"       Anesthesia Assessment: Preoperative EQUATION     Planned Procedure: Procedure(s) (LRB):  SALPINGO-OOPHORECTOMY, BILATERAL (Bilateral)  DEBULKING, NEOPLASM (N/A)  STAGING (N/A)  HYSTERECTOMY, TOTAL, ABDOMINAL (N/A)  Requested Anesthesia Type:General  Surgeon: Pérez Shukla MD  Service: OB/GYN  Known or anticipated Date of Surgery:11/4/2019     Surgeon notes: reviewed     Electronic QUestionnaire Assessment completed via nurse interview with patient.         "No Aq"        Triage considerations:      The patient has no apparent active cardiac condition (No unstable coronary Syndrome such as severe unstable angina or recent [<1 month] myocardial infarction, decompensated CHF, severe valvular   disease or significant arrhythmia)     Previous anesthesia records:Not available     Last PCP note: within 3 months , within [facility] Dr[facility] Alegre  Subspecialty notes: gyn/ oncology     Other important co-morbidities:   Elevated cancer antigen     Tests already available:  Available tests,  within 1 month , within [facility] .                            Instructions given. (See in Nurse's note)     Optimization:  Anesthesia Preop Clinic Assessment  Indicated    Medical Opinion Indicated                                        Plan:    Testing:  T&S   Pre-anesthesia  visit                                        Visit focus: concerns in complex and/or prolonged anesthesia                           Consultation:Patient's PCP for a statement of optimization                              Navigation: Tests Scheduled.                         Consults scheduled.                        Results will be tracked by Preop Clinic.    Addendum: POC done/Lab-T&S on 10/25/19-"in process"/"Clearance" appt. on 10/28/19 @ 3:30p with [facility] PCP--Pending. Suzanne Tavarez RN  10/25/19    10/31/19- Per -Pre-operative clearance:      - ("Clearance" note on10/28/19 note):   Patient in otherwise good health, with good functional " capacity, low risk. Cleared for HOLLY and BSO as scheduled, medically optimized, no contraindication, no additional testing needed.  CANDACE Gonzalez       [facility]  Anesthesia Pre-Operative Evaluation         Patient Name: Liz Coto  YOB: 1955  MRN: [id number]    SUBJECTIVE:     Pre-operative evaluation for Procedure(s) (LRB):  SALPINGO-OOPHORECTOMY, BILATERAL (Bilateral)  DEBULKING, NEOPLASM (N/A)  STAGING (N/A)  HYSTERECTOMY, TOTAL, ABDOMINAL (N/A)     10/25/2019    Liz Coto is a 64 y.o. female w/ no significant PMHx.  Pt initially presented to the ED earlier this month with abdominal pain and nausea.  CT abdomen revealed complex pelvic mass and CA-125 found to be elevated with concern for ovarian cancer.    Pt is a current smoker, but states she is attempting to quit and is currently only smoking 1 cigarette/day.  She denies problems with previous anesthesia.    Patient now presents for the above procedure(s).      LDA: None documented.     Prev airway: None documented.    Drips: None documented.      Patient Active Problem List   Diagnosis    Urticaria    Viral gastroenteritis    Pelvic mass in female    Elevated cancer antigen 125 (CA-125)       Review of patient's allergies indicates:  No Known Allergies    Current Inpatient Medications:      No current outpatient medications on file prior to encounter.     No current facility-administered medications on file prior to encounter.        Past Surgical History:   Procedure Laterality Date    ECTOPIC PREGNANCY SURGERY      1981. thinks it was the left     TONSILLECTOMY         Social History     Socioeconomic History    Marital status:      Spouse name: Not on file    Number of children: 0    Years of education: Not on file    Highest education level: Not on file   Occupational History    Not on file   Social Needs    Financial resource strain: Not on file    Food insecurity:     Worry:  Not on file     Inability: Not on file    Transportation needs:     Medical: Not on file     Non-medical: Not on file   Tobacco Use    Smoking status: Current Every Day Smoker     Packs/day: 0.25     Years: 30.00     Pack years: 7.50     Types: Cigarettes   Substance and Sexual Activity    Alcohol use: Yes     Alcohol/week: 2.0 standard drinks     Types: 2 Cans of beer per week     Comment: socially    Drug use: No    Sexual activity: Yes     Partners: Male     Birth control/protection: None, Post-menopausal   Lifestyle    Physical activity:     Days per week: Not on file     Minutes per session: Not on file    Stress: Not on file   Relationships    Social connections:     Talks on phone: Not on file     Gets together: Not on file     Attends Mu-ism service: Not on file     Active member of club or organization: Not on file     Attends meetings of clubs or organizations: Not on file     Relationship status: Not on file   Other Topics Concern    Not on file   Social History Narrative    Not on file       OBJECTIVE:     Vital Signs Range (Last 24H):  Temp:  [36.7 °C (98.1 °F)]   Pulse:  [71]   Resp:  [16]   BP: (142)/(87)   SpO2:  [99 %]       Significant Labs:  Lab Results   Component Value Date    WBC 5.39 10/08/2019    HGB 13.2 10/08/2019    HCT 41.1 10/08/2019     10/08/2019    CHOL 239 (H) 06/28/2016    TRIG 66 06/28/2016    HDL 71 06/28/2016    ALT 9 (L) 06/28/2016    AST 18 06/28/2016     10/08/2019    K 3.7 10/08/2019     10/08/2019    CREATININE 0.8 10/08/2019    BUN 6 (L) 10/08/2019    CO2 27 10/08/2019    TSH 2.677 10/08/2019    HGBA1C 5.4 10/08/2019       Diagnostic Studies:   EXAMINATION:  XR CHEST PA AND LATERAL    CLINICAL HISTORY:  Intra-abdominal and pelvic swelling, mass and lump, unspecified site    TECHNIQUE:  PA and lateral views of the chest were performed.    COMPARISON:  None    FINDINGS:  Mediastinal structures are midline. Cardiac silhouette and pulmonary  vascular distribution are normal.    Lung volumes are normal and symmetric. I detect no pulmonary disease, pleural fluid, lymph node enlargement, cardiac decompensation, pneumothorax, pneumomediastinum, pneumoperitoneum or significant osseous abnormality.      Impression       No convincing evidence of disease detected in this patient with a clinical history of pelvic mass.      Electronically signed by: Yasmin Szymanski MD  Date: 10/10/2019  Time: 18:46       EKG:   Results for orders placed or performed during the hospital encounter of 10/10/19   EKG 12-lead    Collection Time: 10/10/19  3:49 PM    Narrative    Test Reason : R19.00,    Vent. Rate : 081 BPM     Atrial Rate : 081 BPM     P-R Int : 122 ms          QRS Dur : 070 ms      QT Int : 328 ms       P-R-T Axes : 063 032 -09 degrees     QTc Int : 381 ms    Normal sinus rhythm  Nonspecific T wave abnormality  Abnormal ECG  No previous ECGs available  Confirmed by Spencer Mcconnell MD ([id number]) on 10/11/2019 9:35:32 AM    Referred By: ASAF MORRIS           Confirmed By:Spencer Mcconnell MD       2D ECHO:  TTE:  No results found for this or any previous visit.    SULMA:  No results found for this or any previous visit.    ASSESSMENT/PLAN:       10/22/2019  Liz Coto is a 64 y.o., female.    Anesthesia Evaluation    I have reviewed the Patient Summary Reports.    I have reviewed the Nursing Notes.   I have reviewed the Medications.     Review of Systems  Anesthesia Hx:  No problems with previous Anesthesia  History of prior surgery of interest to airway management or planning: Previous anesthesia: General  Denies Personal Hx of Anesthesia complications.   Social:  Social Alcohol Use, Smoker    Hematology/Oncology:  Hematology Normal       -- Denies Anemia: Current/Recent Cancer. Other (see Oncology comments) Oncology Comments: Pelvic mass  Elevated CA-125     EENT/Dental:EENT/Dental Normal   Cardiovascular:  Cardiovascular Normal Exercise tolerance: good  Denies  Hypertension.  Denies MI.   Denies CABG/stent.      ECG has been reviewed.    Pulmonary:  Pulmonary Normal  Denies COPD.  Denies Asthma.    Renal/:  Renal/ Normal  Denies Chronic Renal Disease.     Hepatic/GI:   Denies GERD. Liver Disease,    Musculoskeletal:  Musculoskeletal Normal    OB/GYN/PEDS:  Pelvic mass   Neurological:  Neurology Normal Denies TIA.  Denies CVA. Denies Seizures.    Endocrine:  Endocrine Normal Denies Diabetes. Denies Hypothyroidism.    Psych:  Psychiatric Normal           Physical Exam  General:  Well nourished    Airway/Jaw/Neck:  Airway Findings: Mouth Opening: Normal Tongue: Normal  General Airway Assessment: Adult  Mallampati: IV  Improves to III with phonation.  TM Distance: Normal, at least 6 cm  Jaw/Neck Findings:  Micrognathia: Negative Neck ROM: Normal ROM     Eyes/Ears/Nose:  EYES/EARS/NOSE FINDINGS: Normal   Dental:  Dental Findings:    Chest/Lungs:  Chest/Lungs Findings: Clear to auscultation, Normal Respiratory Rate     Heart/Vascular:  Heart Findings: Rate: Normal  Rhythm: Regular Rhythm  Sounds: Normal  Heart murmur: negative    Abdomen:  Abdomen Findings: Normal    Musculoskeletal:  Musculoskeletal Findings: Normal   Skin:  Skin Findings: Normal    Mental Status:  Mental Status Findings:  Cooperative, Alert and Oriented         Anesthesia Plan  Type of Anesthesia, risks & benefits discussed:  Anesthesia Type:  general  Patient's Preference:   Intra-op Monitoring Plan: standard ASA monitors  Intra-op Monitoring Plan Comments:   Post Op Pain Control Plan: per primary service following discharge from PACU, IV/PO Opioids PRN and multimodal analgesia  Post Op Pain Control Plan Comments:   Induction:   IV  Beta Blocker:  Patient is not currently on a Beta-Blocker (No further documentation required).       Informed Consent: Patient understands risks and agrees with Anesthesia plan.  Questions answered. Anesthesia consent signed with patient.  ASA Score: 2     Day of Surgery  Review of History & Physical:    H&P update referred to the surgeon.     Anesthesia Plan Notes:   Discussed possible arterial line placement with patient.        Ready For Surgery From Anesthesia Perspective.

## 2019-10-23 ENCOUNTER — TELEPHONE (OUTPATIENT)
Dept: PREADMISSION TESTING | Facility: HOSPITAL | Age: 64
End: 2019-10-23

## 2019-10-25 ENCOUNTER — HOSPITAL ENCOUNTER (OUTPATIENT)
Dept: PREADMISSION TESTING | Facility: HOSPITAL | Age: 64
Discharge: HOME | End: 2019-10-25
Attending: ANESTHESIOLOGY
Payer: COMMERCIAL

## 2019-10-25 VITALS
HEART RATE: 71 BPM | DIASTOLIC BLOOD PRESSURE: 87 MMHG | BODY MASS INDEX: 22.66 KG/M2 | SYSTOLIC BLOOD PRESSURE: 142 MMHG | WEIGHT: 136 LBS | TEMPERATURE: 98 F | RESPIRATION RATE: 16 BRPM | HEIGHT: 65 IN | OXYGEN SATURATION: 99 %

## 2019-10-25 NOTE — DISCHARGE INSTRUCTIONS
Your surgery has been scheduled for:___________11/4/19_______________________________    You should report to:  ____[facility], located on the [place] side of the first floor of the           [facility] ([phone])  __x__[facility], located on the [facility] side of the            Second floor of the [facility] [phone])  ____3rd Floor [facility] located on the [facility] side of the [facility] [phone]  Please Note   - Tell your doctor if you take Aspirin, products containing Aspirin, herbal medications  or blood thinners, such as Coumadin, Ticlid, or Plavix.  (Consult your provider regarding holding or stopping before surgery).  - Arrange for someone to drive you home following surgery.  You will not be allowed to leave the surgical facility alone or drive yourself home following sedation and anesthesia.  Before Surgery  - Stop taking all vitamins/ herbal medications 14days prior to surgery  - No Motrin/Advil (Ibuprofen) 1 day before surgery  - No Aleve (Naproxen) 7 days before surgery  - Stop Taking Asprin, products containing Asprin _____days before surgery  - Stop taking blood thinners_______days before surgery  - No Goody's/BC  Powder 7 days before surgery  - Refrain from drinking alcoholic beverages for 24hours before and after surgery  - Stop or limit smoking _________days before surgery  - You may take Tylenol for pain  Night before Surgery   Stop ALL solid food, gum, candy (including vitamins) 8 hours before arrival time.  (Please note: If your surgeon gives you different eating and drinking instructions, please follow surgeon's directions.)   Stop all CLOUDY liquids: coffee with creamer, formula, tube feeds, cloudy juices, non-human milk and breast milk with additives, 6 hours prior to arrival time.   Stop plain breast milk 4 hours prior to arrival time.   The patient should be ENCOURAGED to  drink carbohydrate-rich clear liquids (sports drinks, clear juices) until 2 hours prior to arrival time.   CLEAR liquids include only water, black coffee NO creamer, clear oral rehydration drinks, clear sports drinks or clear fruit juices (no orange juice, no pulpy juices, no apple cider). Advise patients if they can read newsprint through the liquid, it qualifies as clear liquid.    IF IN DOUBT, drink water instead.   - Take a shower or bath (shower is recommended).  Bathe with Hibiclens soap or an antibacterial soap from the neck down.  If not supplied by your surgeon, hibiclens soap will need to be purchased over the counter in pharmacy.  Rinse soap off thoroughly.  - Shampoo your hair with your regular shampoo  The Day of Surgery  · NOTHING TO  DRINK 2 hours before arrival time. If you are told to take medication on the morning of surgery, it may be taken with a sip of water.   - Take another bath or shower with hibiclens or any antibacterial soap, to reduce the chance of infection.  - Take heart and blood pressure medications with a small sip of water, as advised by the perioperative team.  - Do not take fluid pills  - You may brush your teeth and rinse your mouth, but do not swall any additional water.   - Do not apply perfumes, powder, body lotions or deodorant on the day of surgery.  - Nail polish should be removed.  - Do not wear makeup or moisturizer  - Wear comfortable clothes, such as a button front shirt and loose fitting pants.  - Leave all jewelry, including body piercings, and valuables at home.    - Bring any devices you will neeed after surgery such as crutches or canes.  - If you have sleep apnea, please bring your CPAP machine  In the event that your physical condition changes including the onset of a cold or respiratory illness, or if you have to delay or cancel your surgery, please notify your surgeon.  Anesthesia: General Anesthesia     You are watched continuously during your procedure by  your anesthesia provider.     Youre due to have surgery. During surgery, youll be given medicine called anesthesia or anesthetic. This will keep you comfortable and pain-free. Your anesthesia provider will use general anesthesia.  What is general anesthesia?  General anesthesia puts you into a state like deep sleep. It goes into the bloodstream (IV anesthetics), into the lungs (gas anesthetics), or both. You feel nothing during the procedure. You will not remember it. During the procedure, the anesthesia provider monitors you continuously. He or she checks your heart rate and rhythm, blood pressure, breathing, and blood oxygen.  · IV anesthetics. IV anesthetics are given through an IV line in your arm. Theyre often given first. This is so you are asleep before a gas anesthetic is started. Some kinds of IV anesthetics relieve pain. Others relax you. Your doctor will decide which kind is best in your case.  · Gas anesthetics. Gas anesthetics are breathed into the lungs. They are often used to keep you asleep. They can be given through a facemask or a tube placed in your larynx or trachea (breathing tube).  ? If you have a facemask, your anesthesia provider will most likely place it over your nose and mouth while youre still awake. Youll breathe oxygen through the mask as your IV anesthetic is started. Gas anesthetic may be added through the mask.  ? If you have a tube in the larynx or trachea, it will be inserted into your throat after youre asleep.  Anesthesia tools and medicines  You will likely have:  · IV anesthetics. These are put into an IV line into your bloodstream.  · Gas anesthetics. You breathe these anesthetics into your lungs, where they pass into your bloodstream.  · Pulse oximeter. This is a small clip that is attached to the end of your finger. This measures your blood oxygen level.  · Electrocardiography leads (electrodes). These are small sticky pads that are placed on your chest. They record  your heart rate and rhythm.  · Blood pressure cuff. This reads your blood pressure.  Risks and possible complications  General anesthesia has some risks. These include:  · Breathing problems  · Nausea and vomiting  · Sore throat or hoarseness (usually temporary)  · Allergic reaction to the anesthetic  · Irregular heartbeat (rare)  · Cardiac arrest (rare)   Anesthesia safety  · Follow all instructions you are given for how long not to eat or drink before your procedure.  · Be sure your doctor knows what medicines and drugs you take. This includes over-the-counter medicines, herbs, supplements, alcohol or other drugs. You will be asked when those were last taken.  · Have an adult family member or friend drive you home after the procedure.  · For the first 24 hours after your surgery:  ? Do not drive or use heavy equipment.  ? Do not make important decisions or sign legal documents. If important decisions or signing legal documents is necessary during the first 24 hours after surgery, have a trusted family member or spouse act on your behalf.  ? Avoid alcohol.  ? Have a responsible adult stay with you. He or she can watch for problems and help keep you safe.  Date Last Reviewed: 12/1/2016  © 0565-4549 [employer] [address] [place] [address], [place] [address]. All rights reserved. This information is not intended as a substitute for professional medical care. Always follow your healthcare professional's instructions

## 2019-10-28 ENCOUNTER — OFFICE VISIT (OUTPATIENT)
Dept: PRIMARY CARE CLINIC | Facility: CLINIC | Age: 64
End: 2019-10-28
Payer: COMMERCIAL

## 2019-10-28 VITALS
SYSTOLIC BLOOD PRESSURE: 123 MMHG | WEIGHT: 136.88 LBS | BODY MASS INDEX: 22.81 KG/M2 | RESPIRATION RATE: 16 BRPM | OXYGEN SATURATION: 98 % | HEIGHT: 65 IN | TEMPERATURE: 98 F | HEART RATE: 64 BPM | DIASTOLIC BLOOD PRESSURE: 80 MMHG

## 2019-10-28 DIAGNOSIS — R97.1 ELEVATED CA-125: ICD-10-CM

## 2019-10-28 DIAGNOSIS — N94.89 ADNEXAL MASS: Primary | ICD-10-CM

## 2019-10-28 DIAGNOSIS — Z01.818 PRE-OPERATIVE CLEARANCE: ICD-10-CM

## 2019-10-28 PROBLEM — A08.4 VIRAL GASTROENTERITIS: Status: RESOLVED | Noted: 2019-10-05 | Resolved: 2019-10-28

## 2019-10-28 PROCEDURE — 99999 PR PBB SHADOW E&M-EST. PATIENT-LVL III: ICD-10-PCS | Mod: PBBFAC,,, | Performed by: INTERNAL MEDICINE

## 2019-10-28 PROCEDURE — 99213 PR OFFICE/OUTPT VISIT, EST, LEVL III, 20-29 MIN: ICD-10-PCS | Mod: S$GLB,,, | Performed by: INTERNAL MEDICINE

## 2019-10-28 PROCEDURE — 3008F PR BODY MASS INDEX (BMI) DOCUMENTED: ICD-10-PCS | Mod: CPTII,S$GLB,, | Performed by: INTERNAL MEDICINE

## 2019-10-28 PROCEDURE — 99213 OFFICE O/P EST LOW 20 MIN: CPT | Mod: S$GLB,,, | Performed by: INTERNAL MEDICINE

## 2019-10-28 PROCEDURE — 3008F BODY MASS INDEX DOCD: CPT | Mod: CPTII,S$GLB,, | Performed by: INTERNAL MEDICINE

## 2019-10-28 PROCEDURE — 99999 PR PBB SHADOW E&M-EST. PATIENT-LVL III: CPT | Mod: PBBFAC,,, | Performed by: INTERNAL MEDICINE

## 2019-10-30 ENCOUNTER — TELEPHONE (OUTPATIENT)
Dept: PRIMARY CARE CLINIC | Facility: CLINIC | Age: 64
End: 2019-10-30

## 2019-10-30 NOTE — TELEPHONE ENCOUNTER
"----- Message from Suzanne Tavarez RN sent at 10/30/2019 10:04 AM CDT -----  Regarding: "Clearance" status  Patient is having a Salpingo-Oophrectomy, Bilateral Debulking,  Neoplasm Staging Hysterectomy, Total, Abdominal on 11/4/19, with Dr. Pérez Shukla. Patient was recently seen by you on 10/28/19, for "Clearance". Reviewed your recent visit note. Is the patient "Cleared" for her upcoming surgery with Dr. Shukla? Await your reply. Thank you. Sincerely, Suzanne Tavarez RN  [facility] ext. [phone]  "
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
## 2019-11-01 ENCOUNTER — TELEPHONE (OUTPATIENT)
Dept: GYNECOLOGIC ONCOLOGY | Facility: CLINIC | Age: 64
End: 2019-11-01

## 2019-11-01 NOTE — TELEPHONE ENCOUNTER
Left message confirming pt surgery on 11/4. Instructed pt to arrive at 0630 am and go to the second floor of the day of surgery center. Reminded pt nothing to eat or drink after midnight. Office number left to call back if she has any additional questions.

## 2019-11-04 ENCOUNTER — ANESTHESIA (OUTPATIENT)
Dept: SURGERY | Facility: HOSPITAL | Age: 64
DRG: 737 | End: 2019-11-04
Payer: COMMERCIAL

## 2019-11-04 ENCOUNTER — HOSPITAL ENCOUNTER (INPATIENT)
Facility: HOSPITAL | Age: 64
LOS: 6 days | Discharge: HOME | DRG: 737 | End: 2019-11-10
Attending: OBSTETRICS & GYNECOLOGY | Admitting: OBSTETRICS & GYNECOLOGY
Payer: COMMERCIAL

## 2019-11-04 DIAGNOSIS — Z90.79 S/P ABDOMINAL HYSTERECTOMY AND LEFT SALPINGO-OOPHORECTOMY: Primary | ICD-10-CM

## 2019-11-04 DIAGNOSIS — Z90.710 S/P ABDOMINAL HYSTERECTOMY AND LEFT SALPINGO-OOPHORECTOMY: Primary | ICD-10-CM

## 2019-11-04 DIAGNOSIS — Z90.721 S/P ABDOMINAL HYSTERECTOMY AND LEFT SALPINGO-OOPHORECTOMY: Primary | ICD-10-CM

## 2019-11-04 DIAGNOSIS — R19.00 PELVIC MASS IN FEMALE: ICD-10-CM

## 2019-11-04 PROCEDURE — 88112 TISSUE SPECIMEN TO PATHOLOGY: ICD-10-PCS | Mod: 26,,, | Performed by: PATHOLOGY

## 2019-11-04 PROCEDURE — 88112 CYTOPATH CELL ENHANCE TECH: CPT | Mod: 26,,, | Performed by: PATHOLOGY

## 2019-11-04 PROCEDURE — 88331 TISSUE SPECIMEN TO PATHOLOGY - SURGERY: ICD-10-PCS | Mod: 26,,, | Performed by: PATHOLOGY

## 2019-11-04 PROCEDURE — 88112 CYTOPATH CELL ENHANCE TECH: CPT | Performed by: PATHOLOGY

## 2019-11-04 PROCEDURE — 88305 TISSUE EXAM BY PATHOLOGIST: CPT | Performed by: PATHOLOGY

## 2019-11-04 PROCEDURE — 71000033 HC RECOVERY, INTIAL HOUR: Performed by: OBSTETRICS & GYNECOLOGY

## 2019-11-04 PROCEDURE — 63600175 PHARM REV CODE 636 W HCPCS: Performed by: STUDENT IN AN ORGANIZED HEALTH CARE EDUCATION/TRAINING PROGRAM

## 2019-11-04 PROCEDURE — 37000008 HC ANESTHESIA 1ST 15 MINUTES: Performed by: OBSTETRICS & GYNECOLOGY

## 2019-11-04 PROCEDURE — 27201423 OPTIME MED/SURG SUP & DEVICES STERILE SUPPLY: Performed by: OBSTETRICS & GYNECOLOGY

## 2019-11-04 PROCEDURE — D9220A PRA ANESTHESIA: ICD-10-PCS | Mod: ,,, | Performed by: ANESTHESIOLOGY

## 2019-11-04 PROCEDURE — 63600175 PHARM REV CODE 636 W HCPCS: Performed by: ANESTHESIOLOGY

## 2019-11-04 PROCEDURE — 94761 N-INVAS EAR/PLS OXIMETRY MLT: CPT

## 2019-11-04 PROCEDURE — 38780 REMOVE ABDOMEN LYMPH NODES: CPT | Mod: 51,,, | Performed by: SURGERY

## 2019-11-04 PROCEDURE — 88307 TISSUE EXAM BY PATHOLOGIST: CPT | Performed by: PATHOLOGY

## 2019-11-04 PROCEDURE — 88305 TISSUE EXAM BY PATHOLOGIST: CPT | Mod: 26,,, | Performed by: PATHOLOGY

## 2019-11-04 PROCEDURE — 88112 CYTOLOGY SPECIMEN- MEDICAL CYTOLOGY (FLUID/WASH/BRUSH): ICD-10-PCS | Mod: 26,,, | Performed by: PATHOLOGY

## 2019-11-04 PROCEDURE — 88307 TISSUE SPECIMEN TO PATHOLOGY - SURGERY: ICD-10-PCS | Mod: 26,,, | Performed by: PATHOLOGY

## 2019-11-04 PROCEDURE — 94799 UNLISTED PULMONARY SVC/PX: CPT

## 2019-11-04 PROCEDURE — D9220A PRA ANESTHESIA: Mod: ,,, | Performed by: ANESTHESIOLOGY

## 2019-11-04 PROCEDURE — 88331 PATH CONSLTJ SURG 1 BLK 1SPC: CPT | Mod: 26,,, | Performed by: PATHOLOGY

## 2019-11-04 PROCEDURE — 71000039 HC RECOVERY, EACH ADD'L HOUR: Performed by: OBSTETRICS & GYNECOLOGY

## 2019-11-04 PROCEDURE — 20600001 HC STEP DOWN PRIVATE ROOM

## 2019-11-04 PROCEDURE — 36000711: Performed by: OBSTETRICS & GYNECOLOGY

## 2019-11-04 PROCEDURE — 44120 REMOVAL OF SMALL INTESTINE: CPT | Mod: ,,, | Performed by: SURGERY

## 2019-11-04 PROCEDURE — 88305 TISSUE SPECIMEN TO PATHOLOGY - SURGERY: ICD-10-PCS | Mod: 26,,, | Performed by: PATHOLOGY

## 2019-11-04 PROCEDURE — 27100025 HC TUBING, SET FLUID WARMER: Performed by: STUDENT IN AN ORGANIZED HEALTH CARE EDUCATION/TRAINING PROGRAM

## 2019-11-04 PROCEDURE — 88305 CYTOLOGY SPECIMEN- MEDICAL CYTOLOGY (FLUID/WASH/BRUSH): ICD-10-PCS | Mod: 26,,, | Performed by: PATHOLOGY

## 2019-11-04 PROCEDURE — 25000003 PHARM REV CODE 250: Performed by: STUDENT IN AN ORGANIZED HEALTH CARE EDUCATION/TRAINING PROGRAM

## 2019-11-04 PROCEDURE — 63600175 PHARM REV CODE 636 W HCPCS: Performed by: OBSTETRICS & GYNECOLOGY

## 2019-11-04 PROCEDURE — 36000710: Performed by: OBSTETRICS & GYNECOLOGY

## 2019-11-04 PROCEDURE — C9290 INJ, BUPIVACAINE LIPOSOME: HCPCS | Performed by: OBSTETRICS & GYNECOLOGY

## 2019-11-04 PROCEDURE — 58956 BSO OMENTECTOMY W/TAH: CPT | Mod: ,,, | Performed by: OBSTETRICS & GYNECOLOGY

## 2019-11-04 PROCEDURE — C1765 ADHESION BARRIER: HCPCS | Performed by: OBSTETRICS & GYNECOLOGY

## 2019-11-04 PROCEDURE — 25000003 PHARM REV CODE 250: Performed by: ANESTHESIOLOGY

## 2019-11-04 PROCEDURE — 86920 COMPATIBILITY TEST SPIN: CPT

## 2019-11-04 PROCEDURE — 25000003 PHARM REV CODE 250: Performed by: OBSTETRICS & GYNECOLOGY

## 2019-11-04 PROCEDURE — 88307 TISSUE EXAM BY PATHOLOGIST: CPT | Mod: 26,,, | Performed by: PATHOLOGY

## 2019-11-04 PROCEDURE — 44120 PR RESECT SMALL INTEST,SINGL RESEC/ANAS: ICD-10-PCS | Mod: ,,, | Performed by: SURGERY

## 2019-11-04 PROCEDURE — 58956 PR BSO, OMENTECTOMY W/TAH: ICD-10-PCS | Mod: ,,, | Performed by: OBSTETRICS & GYNECOLOGY

## 2019-11-04 PROCEDURE — 38780 PR REMOVE ABD LYMPH NODES EXTENSIVE: ICD-10-PCS | Mod: 51,,, | Performed by: SURGERY

## 2019-11-04 PROCEDURE — 37000009 HC ANESTHESIA EA ADD 15 MINS: Performed by: OBSTETRICS & GYNECOLOGY

## 2019-11-04 PROCEDURE — 25000003 PHARM REV CODE 250

## 2019-11-04 DEVICE — BARRIER SEPRAFILM ADHESION: Type: IMPLANTABLE DEVICE | Site: ABDOMEN | Status: FUNCTIONAL

## 2019-11-04 RX ORDER — HYDRALAZINE HYDROCHLORIDE 20 MG/ML
INJECTION INTRAMUSCULAR; INTRAVENOUS
Status: DISPENSED
Start: 2019-11-04 | End: 2019-11-05

## 2019-11-04 RX ORDER — HEPARIN SODIUM 1000 [USP'U]/ML
INJECTION, SOLUTION INTRAVENOUS; SUBCUTANEOUS
Status: DISCONTINUED | OUTPATIENT
Start: 2019-11-04 | End: 2019-11-04

## 2019-11-04 RX ORDER — SODIUM CHLORIDE 0.9 % (FLUSH) 0.9 %
10 SYRINGE (ML) INJECTION
Status: DISCONTINUED | OUTPATIENT
Start: 2019-11-04 | End: 2019-11-04

## 2019-11-04 RX ORDER — ACETAMINOPHEN 10 MG/ML
INJECTION, SOLUTION INTRAVENOUS
Status: DISCONTINUED | OUTPATIENT
Start: 2019-11-04 | End: 2019-11-04

## 2019-11-04 RX ORDER — SODIUM CHLORIDE 9 MG/ML
INJECTION, SOLUTION INTRAVENOUS CONTINUOUS PRN
Status: DISCONTINUED | OUTPATIENT
Start: 2019-11-04 | End: 2019-11-04

## 2019-11-04 RX ORDER — MUPIROCIN 20 MG/G
OINTMENT TOPICAL
Status: DISCONTINUED | OUTPATIENT
Start: 2019-11-04 | End: 2019-11-04

## 2019-11-04 RX ORDER — ROCURONIUM BROMIDE 10 MG/ML
INJECTION, SOLUTION INTRAVENOUS
Status: DISCONTINUED | OUTPATIENT
Start: 2019-11-04 | End: 2019-11-04

## 2019-11-04 RX ORDER — MIDAZOLAM HYDROCHLORIDE 1 MG/ML
INJECTION, SOLUTION INTRAMUSCULAR; INTRAVENOUS
Status: DISCONTINUED | OUTPATIENT
Start: 2019-11-04 | End: 2019-11-04

## 2019-11-04 RX ORDER — ACETAMINOPHEN 500 MG
1000 TABLET ORAL
Status: COMPLETED | OUTPATIENT
Start: 2019-11-04 | End: 2019-11-04

## 2019-11-04 RX ORDER — HYDRALAZINE HYDROCHLORIDE 20 MG/ML
5 INJECTION INTRAMUSCULAR; INTRAVENOUS ONCE
Status: COMPLETED | OUTPATIENT
Start: 2019-11-04 | End: 2019-11-04

## 2019-11-04 RX ORDER — PROPOFOL 10 MG/ML
VIAL (ML) INTRAVENOUS
Status: DISCONTINUED | OUTPATIENT
Start: 2019-11-04 | End: 2019-11-04

## 2019-11-04 RX ORDER — ONDANSETRON 2 MG/ML
INJECTION INTRAMUSCULAR; INTRAVENOUS
Status: DISCONTINUED | OUTPATIENT
Start: 2019-11-04 | End: 2019-11-04

## 2019-11-04 RX ORDER — NEOSTIGMINE METHYLSULFATE 1 MG/ML
INJECTION, SOLUTION INTRAVENOUS
Status: DISCONTINUED | OUTPATIENT
Start: 2019-11-04 | End: 2019-11-04

## 2019-11-04 RX ORDER — AMOXICILLIN 250 MG
1 CAPSULE ORAL 2 TIMES DAILY
Status: DISCONTINUED | OUTPATIENT
Start: 2019-11-04 | End: 2019-11-10 | Stop reason: HOSPADM

## 2019-11-04 RX ORDER — DEXTROSE MONOHYDRATE, SODIUM CHLORIDE, AND POTASSIUM CHLORIDE 50; 1.49; 4.5 G/1000ML; G/1000ML; G/1000ML
INJECTION, SOLUTION INTRAVENOUS CONTINUOUS
Status: DISCONTINUED | OUTPATIENT
Start: 2019-11-04 | End: 2019-11-07

## 2019-11-04 RX ORDER — DEXTROSE MONOHYDRATE, SODIUM CHLORIDE, AND POTASSIUM CHLORIDE 50; 1.49; 4.5 G/1000ML; G/1000ML; G/1000ML
INJECTION, SOLUTION INTRAVENOUS
Status: COMPLETED
Start: 2019-11-04 | End: 2019-11-04

## 2019-11-04 RX ORDER — IBUPROFEN 600 MG/1
600 TABLET ORAL EVERY 6 HOURS
Status: DISCONTINUED | OUTPATIENT
Start: 2019-11-05 | End: 2019-11-10 | Stop reason: HOSPADM

## 2019-11-04 RX ORDER — OXYCODONE HYDROCHLORIDE 10 MG/1
10 TABLET ORAL EVERY 6 HOURS PRN
Status: DISCONTINUED | OUTPATIENT
Start: 2019-11-04 | End: 2019-11-10 | Stop reason: HOSPADM

## 2019-11-04 RX ORDER — ENOXAPARIN SODIUM 100 MG/ML
40 INJECTION SUBCUTANEOUS EVERY 24 HOURS
Status: DISCONTINUED | OUTPATIENT
Start: 2019-11-05 | End: 2019-11-10 | Stop reason: HOSPADM

## 2019-11-04 RX ORDER — OXYCODONE HYDROCHLORIDE 5 MG/1
5 TABLET ORAL EVERY 6 HOURS PRN
Status: DISCONTINUED | OUTPATIENT
Start: 2019-11-04 | End: 2019-11-10 | Stop reason: HOSPADM

## 2019-11-04 RX ORDER — CEFAZOLIN SODIUM 1 G/3ML
2 INJECTION, POWDER, FOR SOLUTION INTRAMUSCULAR; INTRAVENOUS
Status: COMPLETED | OUTPATIENT
Start: 2019-11-04 | End: 2019-11-04

## 2019-11-04 RX ORDER — GABAPENTIN 300 MG/1
600 CAPSULE ORAL
Status: COMPLETED | OUTPATIENT
Start: 2019-11-04 | End: 2019-11-04

## 2019-11-04 RX ORDER — FENTANYL CITRATE 50 UG/ML
25 INJECTION, SOLUTION INTRAMUSCULAR; INTRAVENOUS EVERY 5 MIN PRN
Status: COMPLETED | OUTPATIENT
Start: 2019-11-04 | End: 2019-11-04

## 2019-11-04 RX ORDER — DEXAMETHASONE SODIUM PHOSPHATE 4 MG/ML
INJECTION, SOLUTION INTRA-ARTICULAR; INTRALESIONAL; INTRAMUSCULAR; INTRAVENOUS; SOFT TISSUE
Status: DISCONTINUED | OUTPATIENT
Start: 2019-11-04 | End: 2019-11-04

## 2019-11-04 RX ORDER — SODIUM CHLORIDE 0.9 % (FLUSH) 0.9 %
10 SYRINGE (ML) INJECTION
Status: DISCONTINUED | OUTPATIENT
Start: 2019-11-04 | End: 2019-11-04 | Stop reason: HOSPADM

## 2019-11-04 RX ORDER — MUPIROCIN 20 MG/G
1 OINTMENT TOPICAL 2 TIMES DAILY
Status: COMPLETED | OUTPATIENT
Start: 2019-11-04 | End: 2019-11-09

## 2019-11-04 RX ORDER — KETOROLAC TROMETHAMINE 30 MG/ML
30 INJECTION, SOLUTION INTRAMUSCULAR; INTRAVENOUS EVERY 6 HOURS
Status: COMPLETED | OUTPATIENT
Start: 2019-11-04 | End: 2019-11-05

## 2019-11-04 RX ORDER — BISACODYL 10 MG
10 SUPPOSITORY, RECTAL RECTAL DAILY PRN
Status: DISCONTINUED | OUTPATIENT
Start: 2019-11-04 | End: 2019-11-10 | Stop reason: HOSPADM

## 2019-11-04 RX ORDER — LIDOCAINE HCL/PF 100 MG/5ML
SYRINGE (ML) INTRAVENOUS
Status: DISCONTINUED | OUTPATIENT
Start: 2019-11-04 | End: 2019-11-04

## 2019-11-04 RX ORDER — KETAMINE HCL IN 0.9 % NACL 50 MG/5 ML
SYRINGE (ML) INTRAVENOUS
Status: DISCONTINUED | OUTPATIENT
Start: 2019-11-04 | End: 2019-11-04

## 2019-11-04 RX ORDER — FAMOTIDINE 20 MG/1
20 TABLET, FILM COATED ORAL 2 TIMES DAILY
Status: DISCONTINUED | OUTPATIENT
Start: 2019-11-04 | End: 2019-11-10 | Stop reason: HOSPADM

## 2019-11-04 RX ORDER — PHENYLEPHRINE HYDROCHLORIDE 10 MG/ML
INJECTION INTRAVENOUS
Status: DISCONTINUED | OUTPATIENT
Start: 2019-11-04 | End: 2019-11-04

## 2019-11-04 RX ORDER — HYDROMORPHONE HYDROCHLORIDE 1 MG/ML
1 INJECTION, SOLUTION INTRAMUSCULAR; INTRAVENOUS; SUBCUTANEOUS EVERY 4 HOURS PRN
Status: DISCONTINUED | OUTPATIENT
Start: 2019-11-04 | End: 2019-11-10 | Stop reason: HOSPADM

## 2019-11-04 RX ORDER — FENTANYL CITRATE 50 UG/ML
INJECTION, SOLUTION INTRAMUSCULAR; INTRAVENOUS
Status: DISCONTINUED | OUTPATIENT
Start: 2019-11-04 | End: 2019-11-04

## 2019-11-04 RX ORDER — ONDANSETRON 8 MG/1
8 TABLET, ORALLY DISINTEGRATING ORAL EVERY 8 HOURS PRN
Status: DISCONTINUED | OUTPATIENT
Start: 2019-11-04 | End: 2019-11-08

## 2019-11-04 RX ORDER — ONDANSETRON 2 MG/ML
4 INJECTION INTRAMUSCULAR; INTRAVENOUS DAILY PRN
Status: DISCONTINUED | OUTPATIENT
Start: 2019-11-04 | End: 2019-11-04 | Stop reason: HOSPADM

## 2019-11-04 RX ORDER — GLYCOPYRROLATE 0.2 MG/ML
INJECTION INTRAMUSCULAR; INTRAVENOUS
Status: DISCONTINUED | OUTPATIENT
Start: 2019-11-04 | End: 2019-11-04

## 2019-11-04 RX ORDER — LIDOCAINE HYDROCHLORIDE 10 MG/ML
1 INJECTION, SOLUTION EPIDURAL; INFILTRATION; INTRACAUDAL; PERINEURAL ONCE
Status: DISCONTINUED | OUTPATIENT
Start: 2019-11-04 | End: 2019-11-04

## 2019-11-04 RX ORDER — DIPHENHYDRAMINE HYDROCHLORIDE 50 MG/ML
25 INJECTION INTRAMUSCULAR; INTRAVENOUS EVERY 4 HOURS PRN
Status: DISCONTINUED | OUTPATIENT
Start: 2019-11-04 | End: 2019-11-10 | Stop reason: HOSPADM

## 2019-11-04 RX ORDER — ENOXAPARIN SODIUM 100 MG/ML
40 INJECTION SUBCUTANEOUS DAILY
Qty: 8.4 ML | Refills: 0 | Status: SHIPPED | OUTPATIENT
Start: 2019-11-04 | End: 2019-12-01

## 2019-11-04 RX ORDER — DIPHENHYDRAMINE HCL 25 MG
25 CAPSULE ORAL EVERY 4 HOURS PRN
Status: DISCONTINUED | OUTPATIENT
Start: 2019-11-04 | End: 2019-11-10 | Stop reason: HOSPADM

## 2019-11-04 RX ADMIN — FENTANYL CITRATE 25 MCG: 50 INJECTION, SOLUTION INTRAMUSCULAR; INTRAVENOUS at 09:11

## 2019-11-04 RX ADMIN — FAMOTIDINE 20 MG: 20 TABLET ORAL at 02:11

## 2019-11-04 RX ADMIN — ROCURONIUM BROMIDE 50 MG: 10 INJECTION, SOLUTION INTRAVENOUS at 08:11

## 2019-11-04 RX ADMIN — Medication 10 MG: at 09:11

## 2019-11-04 RX ADMIN — ROCURONIUM BROMIDE 10 MG: 10 INJECTION, SOLUTION INTRAVENOUS at 10:11

## 2019-11-04 RX ADMIN — MUPIROCIN 1 G: 20 OINTMENT TOPICAL at 09:11

## 2019-11-04 RX ADMIN — ACETAMINOPHEN 1000 MG: 10 INJECTION, SOLUTION INTRAVENOUS at 11:11

## 2019-11-04 RX ADMIN — SENNOSIDES AND DOCUSATE SODIUM 1 TABLET: 8.6; 5 TABLET ORAL at 02:11

## 2019-11-04 RX ADMIN — FENTANYL CITRATE 25 MCG: 50 INJECTION, SOLUTION INTRAMUSCULAR; INTRAVENOUS at 02:11

## 2019-11-04 RX ADMIN — SENNOSIDES AND DOCUSATE SODIUM 1 TABLET: 8.6; 5 TABLET ORAL at 09:11

## 2019-11-04 RX ADMIN — MUPIROCIN: 20 OINTMENT TOPICAL at 07:11

## 2019-11-04 RX ADMIN — HYDRALAZINE HYDROCHLORIDE 5 MG: 20 INJECTION INTRAMUSCULAR; INTRAVENOUS at 04:11

## 2019-11-04 RX ADMIN — CEFAZOLIN 2 G: 330 INJECTION, POWDER, FOR SOLUTION INTRAMUSCULAR; INTRAVENOUS at 08:11

## 2019-11-04 RX ADMIN — Medication 10 MG: at 10:11

## 2019-11-04 RX ADMIN — ROCURONIUM BROMIDE 20 MG: 10 INJECTION, SOLUTION INTRAVENOUS at 10:11

## 2019-11-04 RX ADMIN — KETOROLAC TROMETHAMINE 30 MG: 30 INJECTION, SOLUTION INTRAMUSCULAR; INTRAVENOUS at 11:11

## 2019-11-04 RX ADMIN — ROCURONIUM BROMIDE 10 MG: 10 INJECTION, SOLUTION INTRAVENOUS at 09:11

## 2019-11-04 RX ADMIN — Medication 20 MG: at 08:11

## 2019-11-04 RX ADMIN — POTASSIUM CHLORIDE, DEXTROSE MONOHYDRATE AND SODIUM CHLORIDE: 150; 5; 450 INJECTION, SOLUTION INTRAVENOUS at 12:11

## 2019-11-04 RX ADMIN — SODIUM CHLORIDE: 0.9 INJECTION, SOLUTION INTRAVENOUS at 08:11

## 2019-11-04 RX ADMIN — NEOSTIGMINE METHYLSULFATE 4 MG: 1 INJECTION INTRAVENOUS at 11:11

## 2019-11-04 RX ADMIN — SODIUM CHLORIDE: 0.9 INJECTION, SOLUTION INTRAVENOUS at 10:11

## 2019-11-04 RX ADMIN — ROCURONIUM BROMIDE 10 MG: 10 INJECTION, SOLUTION INTRAVENOUS at 11:11

## 2019-11-04 RX ADMIN — FENTANYL CITRATE 100 MCG: 50 INJECTION, SOLUTION INTRAMUSCULAR; INTRAVENOUS at 08:11

## 2019-11-04 RX ADMIN — ONDANSETRON 4 MG: 2 INJECTION INTRAMUSCULAR; INTRAVENOUS at 02:11

## 2019-11-04 RX ADMIN — KETOROLAC TROMETHAMINE 30 MG: 30 INJECTION, SOLUTION INTRAMUSCULAR; INTRAVENOUS at 02:11

## 2019-11-04 RX ADMIN — ACETAMINOPHEN 1000 MG: 500 TABLET ORAL at 08:11

## 2019-11-04 RX ADMIN — ONDANSETRON 4 MG: 2 INJECTION INTRAMUSCULAR; INTRAVENOUS at 11:11

## 2019-11-04 RX ADMIN — SODIUM CHLORIDE, SODIUM GLUCONATE, SODIUM ACETATE, POTASSIUM CHLORIDE, MAGNESIUM CHLORIDE, SODIUM PHOSPHATE, DIBASIC, AND POTASSIUM PHOSPHATE: .53; .5; .37; .037; .03; .012; .00082 INJECTION, SOLUTION INTRAVENOUS at 09:11

## 2019-11-04 RX ADMIN — FAMOTIDINE 20 MG: 20 TABLET ORAL at 09:11

## 2019-11-04 RX ADMIN — PROPOFOL 150 MG: 10 INJECTION, EMULSION INTRAVENOUS at 08:11

## 2019-11-04 RX ADMIN — GLYCOPYRROLATE 0.6 MG: 0.2 INJECTION, SOLUTION INTRAMUSCULAR; INTRAVENOUS at 11:11

## 2019-11-04 RX ADMIN — PHENYLEPHRINE HYDROCHLORIDE 100 MCG: 10 INJECTION INTRAVENOUS at 10:11

## 2019-11-04 RX ADMIN — SODIUM CHLORIDE, SODIUM GLUCONATE, SODIUM ACETATE, POTASSIUM CHLORIDE, MAGNESIUM CHLORIDE, SODIUM PHOSPHATE, DIBASIC, AND POTASSIUM PHOSPHATE: .53; .5; .37; .037; .03; .012; .00082 INJECTION, SOLUTION INTRAVENOUS at 08:11

## 2019-11-04 RX ADMIN — KETOROLAC TROMETHAMINE 30 MG: 30 INJECTION, SOLUTION INTRAMUSCULAR; INTRAVENOUS at 07:11

## 2019-11-04 RX ADMIN — GABAPENTIN 600 MG: 300 CAPSULE ORAL at 08:11

## 2019-11-04 RX ADMIN — PHENYLEPHRINE HYDROCHLORIDE 100 MCG: 10 INJECTION INTRAVENOUS at 09:11

## 2019-11-04 RX ADMIN — MIDAZOLAM HYDROCHLORIDE 2 MG: 1 INJECTION, SOLUTION INTRAMUSCULAR; INTRAVENOUS at 08:11

## 2019-11-04 RX ADMIN — DEXTROSE MONOHYDRATE, SODIUM CHLORIDE, AND POTASSIUM CHLORIDE: 50; 4.5; 1.49 INJECTION, SOLUTION INTRAVENOUS at 12:11

## 2019-11-04 RX ADMIN — OXYCODONE HYDROCHLORIDE 10 MG: 10 TABLET ORAL at 02:11

## 2019-11-04 RX ADMIN — LIDOCAINE HYDROCHLORIDE 100 MG: 20 INJECTION, SOLUTION INTRAVENOUS at 08:11

## 2019-11-04 RX ADMIN — DEXAMETHASONE SODIUM PHOSPHATE 4 MG: 4 INJECTION, SOLUTION INTRAMUSCULAR; INTRAVENOUS at 08:11

## 2019-11-04 NOTE — PROGRESS NOTES
Report called to Annabelle RN, pt made ready for transport to [facility] via [facility] bed per transport, pt resting quietly,VSS, wilfredo po fluids, dressing dry and intact, mother informed of room number,stable at present, sent with black duffel bag.

## 2019-11-04 NOTE — ANESTHESIA POSTPROCEDURE EVALUATION
Anesthesia Post Evaluation    Patient: Liz Coto    Procedure(s) Performed: Procedure(s) (LRB):  SALPINGO-OOPHORECTOMY, BILATERAL (N/A)  DEBULKING, NEOPLASM (N/A)  LYSIS, ADHESIONS (N/A)  HYSTERECTOMY, TOTAL, ABDOMINAL (N/A)  OMENTECTOMY (N/A)  EXCISION, SMALL INTESTINE (N/A)  LYMPHADENECTOMY, PARA-AORTIC (N/A)  LYMPHADENECTOMY, PELVIS (Bilateral)    Final Anesthesia Type: general  Patient location during evaluation: PACU  Patient participation: Yes- Able to Participate  Level of consciousness: awake and alert  Post-procedure vital signs: reviewed and stable  Pain management: adequate  Airway patency: patent  PONV status at discharge: No PONV  Anesthetic complications: no      Cardiovascular status: hemodynamically stable  Respiratory status: unassisted  Hydration status: euvolemic  Follow-up not needed.          Vitals Value Taken Time   /77 11/4/2019  5:25 PM   Temp 36.9 °C (98.4 °F) 11/4/2019  4:00 PM   Pulse 83 11/4/2019  5:29 PM   Resp 18 11/4/2019  5:27 PM   SpO2 100 % 11/4/2019  5:29 PM   Vitals shown include unvalidated device data.      No case tracking events are documented in the log.      Pain/Caity Score: Pain Rating Prior to Med Admin: 5 (11/4/2019  2:54 PM)  Caity Score: 10 (11/4/2019  5:00 PM)

## 2019-11-04 NOTE — PROGRESS NOTES
MD to bedside for afternoon assessment.    Patient laying quietly in bed. Reports that her pain is starting to become severe and she desires pain medications. Her pain is primarily along her incision. She has not taken any PO intake yet including fluids however denies any nausea or vomiting. No flatus, no BMs. Harry in place. TEDs/SCDs in place.    Vitals:    11/04/19 1300   BP: (!) 147/101   Pulse: 70   Resp: 13   Temp:      Physical Exam   Constitutional: She is oriented to person, place, and time and well-developed, well-nourished, and in no distress. No distress.   Cardiovascular: Normal rate and regular rhythm.   Pulmonary/Chest: Effort normal and breath sounds normal. No respiratory distress.   Abdominal: Soft. Bowel sounds are normal. There is no tenderness. There is no rebound and no guarding.   Midline vertical incision with bandage in place. Appears clean/dry/intact, no evidence of shadowing. Abdomen soft, non tender, non distended.   Neurological: She is alert and oriented to person, place, and time.   Skin: Skin is warm and dry. She is not diaphoretic.   Psychiatric: Mood, affect and judgment normal.   Vitals reviewed.    ASSESSMENT/PLAN:  - continue routine postop care.   - continue pain control with toradol/ibuprofen>percocet 5/10 IR, Dilaudid prn for breakthrough  - continue clear diet > ADAT. IVFs (D5 1/2  NS w/ KCl @ 75cc/hr with saline lock once tolerating PO).  - ambulate TID.  - Harry Will discontinue harry in am with passive VT.   - F/u H/H in the AM  - DVT ppx: TEDs/SCDs and lovenox     Camille Perez MD  OB/GYN  PGY-2

## 2019-11-04 NOTE — INTERVAL H&P NOTE
The patient has been examined and the H&P has been reviewed:    I concur with the findings and no changes have occurred since H&P was written.    Anesthesia/Surgery risks, benefits and alternative options discussed and understood by patient/family.          Active Hospital Problems    Diagnosis  POA    Pelvic mass in female [R19.00]  Yes      Resolved Hospital Problems   No resolved problems to display.

## 2019-11-04 NOTE — TRANSFER OF CARE
"Anesthesia Transfer of Care Note    Patient: Liz Coto    Procedure(s) Performed: Procedure(s) (LRB):  SALPINGO-OOPHORECTOMY, BILATERAL (N/A)  DEBULKING, NEOPLASM (N/A)  LYSIS, ADHESIONS (N/A)  HYSTERECTOMY, TOTAL, ABDOMINAL (N/A)  OMENTECTOMY (N/A)  EXCISION, SMALL INTESTINE (N/A)  LYMPHADENECTOMY, PARA-AORTIC (N/A)  LYMPHADENECTOMY, PELVIS (Bilateral)    Patient location: PACU    Anesthesia Type: general    Transport from OR: Transported from OR on 6-10 L/min O2 by face mask with adequate spontaneous ventilation    Post vital signs: stable    Level of consciousness: awake    Nausea/Vomiting: no nausea/vomiting    Complications: none    Transfer of care protocol was followed      Last vitals:   Visit Vitals  BP (!) 146/84 (BP Location: Right arm, Patient Position: Lying)   Pulse 66   Temp 36.4 °C (97.6 °F) (Oral)   Resp 16   Ht 5' 4" (1.626 m)   Wt 61.7 kg (136 lb)   SpO2 100%   Breastfeeding? No   BMI 23.34 kg/m²     "

## 2019-11-04 NOTE — BRIEF OP NOTE
[facility]  Brief Operative Note    SUMMARY     Surgery Date: 11/4/2019     Surgeon(s) and Role:  Panel 1:     * Pérez Shukla MD - Primary     * Shaka Arana MD - Resident - Assisting     * Camille Perez MD - Resident - Assisting  Panel 2:     * Clarence Colmenares MD - Primary        Pre-op Diagnosis:  Pelvic mass in female [R19.00]    Post-op Diagnosis:  Post-Op Diagnosis Codes:     * Pelvic mass in female [R19.00]    Procedure(s) (LRB):  SALPINGO-OOPHORECTOMY, BILATERAL (N/A)  DEBULKING, NEOPLASM (N/A)  LYSIS, ADHESIONS (N/A)  HYSTERECTOMY, TOTAL, ABDOMINAL (N/A)  OMENTECTOMY (N/A)  EXCISION, SMALL INTESTINE (N/A)  LYMPHADENECTOMY, PARA-AORTIC (N/A)  LYMPHADENECTOMY, PELVIS (Bilateral)    Anesthesia: General    Description of Procedure:  Removal of uterus, cervix, bilateral tubes and ovaries, omentum and aortocaval node and small bowel resection. No residual disease.          Description of the findings of the procedure: Right adnexal mass. Serous tumor LMP on frozen. Several lops of small bowel adherent to adnexal mass. Serosal injuries to small bowel.  Mass appeared separate from the ovary. Possible fallopian tube primary. No gross residual disease.     Estimated Blood Loss: 200 ml   Total Fluids: 2000 ml   Urine Output: 350 ml            Specimens:   Specimen (12h ago, onward)     Start     Ordered    11/04/19 1131  Specimen to Pathology - Surgery  Once     Comments:  Pre-op Diagnosis: Pelvic mass in female [R19.00]Procedure(s):SALPINGO-OOPHORECTOMY, BILATERALDEBULKING, NEOPLASMLYSIS, ADHESIONSHYSTERECTOMY, TOTAL, ABDOMINALOMENTECTOMY Number of specimens: 13Name of specimens: 1. Right tube and ovary and partial omentum - frozen2. Uterus, cervix, left tube and ovary - permanent3. Right pelvic and common iliac lymph nodes - permanent4. Right obturator lymph nodes - permanent5. Left pelvic lymph nodes - permanent6. Left obturator lymph nodes - permanent7. Omentum A - permanent8.  Omentum B - permanent9. Omentum C - [id number]. Omentum D - [id number]. Omentum E - [id number]. Aorto caval lymph nodes - [id number]. Small bowel - permanent      11/04/19 1130

## 2019-11-04 NOTE — OP NOTE
[facility]  Surgery Department  Operative Note       Date of Procedure: 11/4/2019     Procedure: Procedure(s) (LRB):  SALPINGO-OOPHORECTOMY, BILATERAL (N/A)  DEBULKING, NEOPLASM (N/A)  LYSIS, ADHESIONS (N/A)  HYSTERECTOMY, TOTAL, ABDOMINAL (N/A)  OMENTECTOMY (N/A)  EXCISION, SMALL INTESTINE (N/A)  LYMPHADENECTOMY, PARA-AORTIC (N/A)  LYMPHADENECTOMY, PELVIS (Bilateral)     Surgeon(s) and Role:  Panel 1:     * Pérez Shukla MD - Primary     * Shaka Arana MD - Resident - Assisting     * Camille Perez MD - Resident - Assisting  Panel 2:     * Clarence Colmenares MD - Primary      Pre-Operative Diagnosis: Pelvic mass in female [R19.00]    Post-Operative Diagnosis:   1. Same    Anesthesia: General    Operative Findings:   1. Serosal injury to the small bowel x 3  2. Aortocaval lymph node, pathologically enlarged    Procedures:  1. Small bowel resection with side to side anastomosis   2. Aortocaval lymphadenectomy    Estimated Blood Loss (EBL):  0 mL           Indications:  Liz Coto is a 65yo F with diagnosis of pelvic mass in the operating room with Dr. Shukla for resection and staging. I was called in to assist with small bowel resection and resection of an aortocaval lymph node. Please see Dr. Shukla's operative dictation for the full details of the case.    Details:  Upon entering the operating room the small bowel was run from the terminal ileum to the ligament of Treitz. There were 3 regions in the small bowel of significant serosal injury from mobilizing away from a fixed pelvic mass. These areas were in close proximity and so a small bowel resection was performed removing about 10 inches of distal small bowel. The bowel was divided with endo-ESTEVAN staplers and the mesentery transected with the Enseal device. A side to side small bowel anastomosis was fashioned with the endo-ESTEVAN stapler and the common enterotomy was closed with a running vicryl suture and a second silk  seromuscular layer in Lembert fashion. The mesenteric defect was closed with interrupted vicryl.    During our running of the small bowel we discovered a large pathologic appearing group of lymph nodes in the retroperitoneum adherent to the aorta and infrarenal cava. The peritoneum overlying these nodes was opened and they were mobilized way from the underlying vascular structures. Once free they were sent to pathology as aortocaval lymph nodes.    The peritoneum overlying the lymphadenectomy site was closed with running suture. The small bowel anastomosis and retroperitoneum were hemostatic. The remainder of the dictation will be performed by Dr. Shukla.      Implants:   Implant Name Type Inv. Item Serial No.  Lot No. LRB No. Used   BARRIER SEPRAFILM ADHESION - [id number]  BARRIER SEPRAFILM ADHESION  [employer] ANKIT [id number] N/A 1       Specimens:   Specimen (12h ago, onward)     Start     Ordered    11/04/19 1131  Specimen to Pathology - Surgery  Once     Comments:  Pre-op Diagnosis: Pelvic mass in female [R19.00]Procedure(s):SALPINGO-OOPHORECTOMY, BILATERALDEBULKING, NEOPLASMLYSIS, ADHESIONSHYSTERECTOMY, TOTAL, ABDOMINALOMENTECTOMY Number of specimens: 13Name of specimens: 1. Right tube and ovary and partial omentum - frozen2. Uterus, cervix, left tube and ovary - permanent3. Right pelvic and common iliac lymph nodes - permanent4. Right obturator lymph nodes - permanent5. Left pelvic lymph nodes - permanent6. Left obturator lymph nodes - permanent7. Omentum A - permanent8. Omentum B - permanent9. Omentum C - [id number]. Omentum D - [id number]. Omentum E - [id number]. Aorto caval lymph nodes - [id number]. Small bowel - permanent      11/04/19 1130                        Condition: Good at time of my exiting the operating room    Attestation: I was present and scrubbed for my entire portion of the procedure.

## 2019-11-04 NOTE — PLAN OF CARE
Specimen to Pathology: 1) Right tube and ovary- frozen,  no preservatives , to pathology.  Sent with Mason Heller, at [phone]

## 2019-11-05 LAB
BASOPHILS # BLD AUTO: 0.02 K/UL (ref 0–0.2)
BASOPHILS NFR BLD: 0.2 % (ref 0–1.9)
DIFFERENTIAL METHOD: ABNORMAL
EOSINOPHIL # BLD AUTO: 0 K/UL (ref 0–0.5)
EOSINOPHIL NFR BLD: 0.1 % (ref 0–8)
ERYTHROCYTE [DISTWIDTH] IN BLOOD BY AUTOMATED COUNT: 14.6 % (ref 11.5–14.5)
HCT VFR BLD AUTO: 32.7 % (ref 37–48.5)
HGB BLD-MCNC: 11.1 G/DL (ref 12–16)
IMM GRANULOCYTES # BLD AUTO: 0.03 K/UL (ref 0–0.04)
IMM GRANULOCYTES NFR BLD AUTO: 0.2 % (ref 0–0.5)
LYMPHOCYTES # BLD AUTO: 2.6 K/UL (ref 1–4.8)
LYMPHOCYTES NFR BLD: 20.3 % (ref 18–48)
MCH RBC QN AUTO: 30.7 PG (ref 27–31)
MCHC RBC AUTO-ENTMCNC: 33.9 G/DL (ref 32–36)
MCV RBC AUTO: 90 FL (ref 82–98)
MONOCYTES # BLD AUTO: 1 K/UL (ref 0.3–1)
MONOCYTES NFR BLD: 7.9 % (ref 4–15)
NEUTROPHILS # BLD AUTO: 9 K/UL (ref 1.8–7.7)
NEUTROPHILS NFR BLD: 71.3 % (ref 38–73)
NRBC BLD-RTO: 0 /100 WBC
PLATELET # BLD AUTO: 275 K/UL (ref 150–350)
PMV BLD AUTO: 11.3 FL (ref 9.2–12.9)
RBC # BLD AUTO: 3.62 M/UL (ref 4–5.4)
WBC # BLD AUTO: 12.59 K/UL (ref 3.9–12.7)

## 2019-11-05 PROCEDURE — 85025 COMPLETE CBC W/AUTO DIFF WBC: CPT

## 2019-11-05 PROCEDURE — 25000003 PHARM REV CODE 250: Performed by: STUDENT IN AN ORGANIZED HEALTH CARE EDUCATION/TRAINING PROGRAM

## 2019-11-05 PROCEDURE — 99024 POSTOP FOLLOW-UP VISIT: CPT | Mod: ,,, | Performed by: OBSTETRICS & GYNECOLOGY

## 2019-11-05 PROCEDURE — 63600175 PHARM REV CODE 636 W HCPCS: Performed by: STUDENT IN AN ORGANIZED HEALTH CARE EDUCATION/TRAINING PROGRAM

## 2019-11-05 PROCEDURE — 20600001 HC STEP DOWN PRIVATE ROOM

## 2019-11-05 PROCEDURE — 99024 PR POST-OP FOLLOW-UP VISIT: ICD-10-PCS | Mod: ,,, | Performed by: OBSTETRICS & GYNECOLOGY

## 2019-11-05 PROCEDURE — 94761 N-INVAS EAR/PLS OXIMETRY MLT: CPT

## 2019-11-05 PROCEDURE — 36415 COLL VENOUS BLD VENIPUNCTURE: CPT

## 2019-11-05 RX ADMIN — IBUPROFEN 600 MG: 600 TABLET, FILM COATED ORAL at 05:11

## 2019-11-05 RX ADMIN — FAMOTIDINE 20 MG: 20 TABLET ORAL at 09:11

## 2019-11-05 RX ADMIN — KETOROLAC TROMETHAMINE 30 MG: 30 INJECTION, SOLUTION INTRAMUSCULAR; INTRAVENOUS at 06:11

## 2019-11-05 RX ADMIN — MUPIROCIN 1 G: 20 OINTMENT TOPICAL at 09:11

## 2019-11-05 RX ADMIN — SENNOSIDES AND DOCUSATE SODIUM 1 TABLET: 8.6; 5 TABLET ORAL at 09:11

## 2019-11-05 RX ADMIN — OXYCODONE HYDROCHLORIDE 5 MG: 5 TABLET ORAL at 12:11

## 2019-11-05 RX ADMIN — DEXTROSE MONOHYDRATE, SODIUM CHLORIDE, AND POTASSIUM CHLORIDE: 50; 4.5; 1.49 INJECTION, SOLUTION INTRAVENOUS at 05:11

## 2019-11-05 RX ADMIN — ENOXAPARIN SODIUM 40 MG: 100 INJECTION SUBCUTANEOUS at 04:11

## 2019-11-05 RX ADMIN — DEXTROSE MONOHYDRATE, SODIUM CHLORIDE, AND POTASSIUM CHLORIDE: 50; 4.5; 1.49 INJECTION, SOLUTION INTRAVENOUS at 12:11

## 2019-11-05 RX ADMIN — HYDROMORPHONE HYDROCHLORIDE 1 MG: 1 INJECTION, SOLUTION INTRAMUSCULAR; INTRAVENOUS; SUBCUTANEOUS at 04:11

## 2019-11-05 NOTE — PLAN OF CARE
Pt arrived on unit via bed from PACU.  Pain under control at this time. Pt asking for food. Voiding via Bah. VSS. Family at bedside. Frequent rounds made to assess pain and safety. Patient remained free from falls. Bed in lowest position. Side rails up X 2. Call light within reach. Will continue to monitor.

## 2019-11-05 NOTE — HOSPITAL COURSE
11/05/2019 - NAEON. Patient reports she was able to tolerate clear diet without nausea or vomiting. Pain well controlled on scheduled pain medications. 1x flatus, no BMs. Bah in place, draining clear urine. Has not ambulated yet.   11/06/2019 - Stable. Reports some pain with increased movement yesterday. Pain well controlled on medications. No flatus, no BMs. Urinating without issue. Ambulating in the halls and in her room without difficulty  11/07/2019- Stable. Patient reports 1x episode of vomit overnight however reports improvement after this event. Pain well controlled on medications. Passing flatus, no BMs. Urinating and ambulating in the halls without issue  11/08/2019 - Patient reports that she had nausea throughout the day, particularly when she received medications on an empty stomach. Since then however, patient reports improvement. No vomiting. Has been able to tolerate clear liquids without issue. No abdominal pain - has not required any PRN medications overnight. Passing flatus, no BMs. Ambulating without difficulty. Minimal vaginal spotting.  11/09/2019- Improvement in nausea. She states that overnight she did not experience any nausea or vomiting. She has been able to tolerate clear liquids without issue. No abdominal pain. Did get suppository yesterday and had 1x large BM and has been intermittently passing gas since. She believes this helped extremely with her symptoms. Urinating appropriately. Ambulating without issue  11/10/2019 - NAEON. Patient able to tolerate regular diet without any nausea or vomiting. Abdominal pain has been well controlled on scheduled pain medications, has not required additional PRN medications otherwise. Had 3x BMs yesterday throughout the day, passing gas intermittently. Urinating and ambulating without issue. Anticipate discharge this AM.

## 2019-11-05 NOTE — PROGRESS NOTES
[facility]  General Surgery  Progress Note    Subjective:     History of Present Illness:  No notes on file    Post-Op Info:  Procedure(s) (LRB):  SALPINGO-OOPHORECTOMY, BILATERAL (N/A)  DEBULKING, NEOPLASM (N/A)  LYSIS, ADHESIONS (N/A)  HYSTERECTOMY, TOTAL, ABDOMINAL (N/A)  OMENTECTOMY (N/A)  EXCISION, SMALL INTESTINE (N/A)  LYMPHADENECTOMY, PARA-AORTIC (N/A)  LYMPHADENECTOMY, PELVIS (Bilateral)   1 Day Post-Op     Interval History:   NAEON.   Adequate pain control.   Tolerating a CLD without any nausea or vomit   Passing gas x1 No bowel movements      Scheduled Meds:   enoxaparin  40 mg Subcutaneous Daily    famotidine  20 mg Oral BID    ibuprofen  600 mg Oral Q6H    mupirocin  1 g Nasal BID    senna-docusate 8.6-50 mg  1 tablet Oral BID     Continuous Infusions:   dextrose 5 % and 0.45 % NaCl with KCl 20 mEq 75 mL/hr at 11/05/19 0056     PRN Meds:bisacodyl, diphenhydrAMINE, diphenhydrAMINE, HYDROmorphone, ondansetron, oxyCODONE, oxyCODONE, promethazine (PHENERGAN) IVPB    Review of patient's allergies indicates:  No Known Allergies    Objective:     Vital Signs (Most Recent):  Temp: 98.6 °F (37 °C) (11/05/19 0342)  Pulse: 70 (11/05/19 0342)  Resp: 18 (11/05/19 0342)  BP: 100/64 (11/05/19 0342)  SpO2: 95 % (11/05/19 0342) Vital Signs (24h Range):  Temp:  [97 °F (36.1 °C)-99 °F (37.2 °C)] 98.6 °F (37 °C)  Pulse:  [65-95] 70  Resp:  [9-20] 18  SpO2:  [94 %-100 %] 95 %  BP: (100-150)/() 100/64     Weight: 61.7 kg (136 lb)  Body mass index is 23.34 kg/m².    Intake/Output - Last 3 Shifts       11/03 0700 - 11/04 0659 11/04 0700 - 11/05 0659    I.V. (mL/kg)  3407.5 (55.2)    Total Intake(mL/kg)  3407.5 (55.2)    Urine (mL/kg/hr)  1400 (0.9)    Total Output  1400    Net  +2007.5                   Physical Exam:   Constitutional: She is oriented to person, place, and time. She appears well-developed and well-nourished. No distress.       Cardiovascular: Normal rate and regular rhythm.      Pulmonary/Chest: Effort normal. No respiratory distress.        Abdominal: Soft. She exhibits no distension. Abdominal incision: midline vertical incision with bandage overlying, appears clean/dry/intact. There is no tenderness (appropriate post operative tenderness).                 Neurological: She is alert and oriented to person, place, and time. No cranial nerve deficit.    Skin: Skin is warm and dry. She is not diaphoretic.    Psychiatric: She has a normal mood and affect. Her behavior is normal. Judgment and thought content normal.       Lines/Drains/Airways     Drain                 Urethral Catheter 11/04/19 0845 Non-latex;Straight-tip 16 Fr. less than 1 day          Peripheral Intravenous Line                 Peripheral IV - Single Lumen 11/04/19 0812 20 G Right Hand less than 1 day         Peripheral IV - Single Lumen 11/04/19 0848 18 G Left Hand less than 1 day              Laboratory:  CBC:   Recent Labs   Lab 11/05/19  0455   WBC 12.59   HGB 11.1*   HCT 32.7*        UOP: 1.01 cc/kg/hr over pas 12 hours    Physical Exam   Constitutional: She is oriented to person, place, and time. She appears well-developed and well-nourished. No distress.   Cardiovascular: Normal rate and regular rhythm.   Pulmonary/Chest: Effort normal. No respiratory distress.   Abdominal: Soft. She exhibits no distension. There is no tenderness (appropriate post operative tenderness).   Neurological: She is alert and oriented to person, place, and time. No cranial nerve deficit.   Skin: Skin is warm and dry. She is not diaphoretic.   Psychiatric: She has a normal mood and affect. Her behavior is normal. Judgment and thought content normal.   Vitals reviewed.        Assessment/Plan:     S/P abdominal hysterectomy and left salpingo-oophorectomy, omentectomy, LND  64 y. Female s/p Small bowel resection with side to side anastomosis + Aortocaval Lymphadenectomy POD 1. Doing well.     - Keep CLD. If patient starts having nausea  or vomit will recommend NPO.   - PRN pain control  - PRN nausea and vomit.   - Will follow  - Call if you have any questions.         Samir Waller MD  General Surgery  [facility]

## 2019-11-05 NOTE — ASSESSMENT & PLAN NOTE
- continue routine postop care.   - continue pain control with tordaol>ibuprofen/percocet/Dilaudid prn for breakthrough  - continue clear diet > advance as tolerated; IVFs (D5 1/2 NS w/ KCl @ 75cc/hr with saline lock once tolerating PO).  - ambulate TID. IS bedside.  - Harry in place. Will discontinue harry in am with passive VT.   - H/h: 11/32/275  - DVT ppx: TEDs/SCDS and lovenox, to start today

## 2019-11-05 NOTE — ASSESSMENT & PLAN NOTE
64 y. Female s/p Small bowel resection with side to side anastomosis + Aortocaval Lymphadenectomy POD 1. Doing well.     - Keep CLD. If patient starts having nausea or vomit will recommend NPO.   - PRN pain control  - PRN nausea and vomit.   - Will follow  - Call if you have any questions.

## 2019-11-05 NOTE — SUBJECTIVE & OBJECTIVE
Interval History:   NAEON.   Adequate pain control.   Tolerating a CLD without any nausea or vomit   Passing gas x1 No bowel movements      Scheduled Meds:   enoxaparin  40 mg Subcutaneous Daily    famotidine  20 mg Oral BID    ibuprofen  600 mg Oral Q6H    mupirocin  1 g Nasal BID    senna-docusate 8.6-50 mg  1 tablet Oral BID     Continuous Infusions:   dextrose 5 % and 0.45 % NaCl with KCl 20 mEq 75 mL/hr at 11/05/19 0056     PRN Meds:bisacodyl, diphenhydrAMINE, diphenhydrAMINE, HYDROmorphone, ondansetron, oxyCODONE, oxyCODONE, promethazine (PHENERGAN) IVPB    Review of patient's allergies indicates:  No Known Allergies    Objective:     Vital Signs (Most Recent):  Temp: 98.6 °F (37 °C) (11/05/19 0342)  Pulse: 70 (11/05/19 0342)  Resp: 18 (11/05/19 0342)  BP: 100/64 (11/05/19 0342)  SpO2: 95 % (11/05/19 0342) Vital Signs (24h Range):  Temp:  [97 °F (36.1 °C)-99 °F (37.2 °C)] 98.6 °F (37 °C)  Pulse:  [65-95] 70  Resp:  [9-20] 18  SpO2:  [94 %-100 %] 95 %  BP: (100-150)/() 100/64     Weight: 61.7 kg (136 lb)  Body mass index is 23.34 kg/m².    Intake/Output - Last 3 Shifts       11/03 0700 - 11/04 0659 11/04 0700 - 11/05 0659    I.V. (mL/kg)  3407.5 (55.2)    Total Intake(mL/kg)  3407.5 (55.2)    Urine (mL/kg/hr)  1400 (0.9)    Total Output  1400    Net  +2007.5                   Physical Exam:   Constitutional: She is oriented to person, place, and time. She appears well-developed and well-nourished. No distress.       Cardiovascular: Normal rate and regular rhythm.     Pulmonary/Chest: Effort normal. No respiratory distress.        Abdominal: Soft. She exhibits no distension. Abdominal incision: midline vertical incision with bandage overlying, appears clean/dry/intact. There is no tenderness (appropriate post operative tenderness).                 Neurological: She is alert and oriented to person, place, and time. No cranial nerve deficit.    Skin: Skin is warm and dry. She is not diaphoretic.     Psychiatric: She has a normal mood and affect. Her behavior is normal. Judgment and thought content normal.       Lines/Drains/Airways     Drain                 Urethral Catheter 11/04/19 0845 Non-latex;Straight-tip 16 Fr. less than 1 day          Peripheral Intravenous Line                 Peripheral IV - Single Lumen 11/04/19 0812 20 G Right Hand less than 1 day         Peripheral IV - Single Lumen 11/04/19 0848 18 G Left Hand less than 1 day              Laboratory:  CBC:   Recent Labs   Lab 11/05/19  0455   WBC 12.59   HGB 11.1*   HCT 32.7*        UOP: 1.01 cc/kg/hr over pas 12 hours    Physical Exam   Constitutional: She is oriented to person, place, and time. She appears well-developed and well-nourished. No distress.   Cardiovascular: Normal rate and regular rhythm.   Pulmonary/Chest: Effort normal. No respiratory distress.   Abdominal: Soft. She exhibits no distension. There is no tenderness (appropriate post operative tenderness).   Neurological: She is alert and oriented to person, place, and time. No cranial nerve deficit.   Skin: Skin is warm and dry. She is not diaphoretic.   Psychiatric: She has a normal mood and affect. Her behavior is normal. Judgment and thought content normal.   Vitals reviewed.

## 2019-11-05 NOTE — PLAN OF CARE
POD#1 HOLLY/BSO/neoplasm debulking/adhesion lysis/OMX/small intestine excision/para- aortic lymphadenectomy/bilateral pelvic lymphadenectomy secondary to pelvic mass. Routine post-op care in place. Vital signs are stable. Patent is currently afebrile. O2 sats WNL on room air. Labs stable. Pain control: ibuprofen 600 mg PO every 6 hours, dilaudid 1 mg IV every 4 hours PRN, oxycodone IR 5-10 mg every 6 hours PRN. IVFs: dextrose 5 % and 0.45 % NaCl with KCl 20 mEq at 75 mL/hr continuous. Clear liquid diet with plans to advance as tolerated. Bah catheter to be discontinued today. TEDs/SCDS ordered and plans for Lovenox to start today. Expected discharge POD#3-4. CM discussed the patient with the team. No discharge needs identified. CM to continue to follow with the team.    Follow-up scheduled as listed below.     Future Appointments   Date Time Provider Department Center   12/6/2019  1:30 PM Pérez Shukla MD [facility] GYN ONC Ladarius radha Bobby, RN, BSN, CM  [facility]  Nurse - Med Onc/Gyn Onc

## 2019-11-05 NOTE — HPI
Liz Coto is a 64 y.o. [id number] who is admitted for post operative care, status post HOLLY/BSO/lysis of adhesions/omentectomy /lymphadenectomoy and debulking of neoplasm. Surgical oncology was also consulted intra operatively to help assist in excision of portion of the small bowel.

## 2019-11-05 NOTE — PROGRESS NOTES
[facility]  Gynecologic Oncology  Progress Note      Patient Name: Liz Coto  MRN: [id number]  Admission Date: 2019  Hospital Length of Stay: 1 days  Attending Provider: Pérez Shukla MD  Primary Care Provider: Griselda Alegre MD  Principal Problem: Pelvic mass in female    Follow-up For: Procedure(s) (LRB):  SALPINGO-OOPHORECTOMY, BILATERAL (N/A)  DEBULKING, NEOPLASM (N/A)  LYSIS, ADHESIONS (N/A)  HYSTERECTOMY, TOTAL, ABDOMINAL (N/A)  OMENTECTOMY (N/A)  EXCISION, SMALL INTESTINE (N/A)  LYMPHADENECTOMY, PARA-AORTIC (N/A)  LYMPHADENECTOMY, PELVIS (Bilateral)  Post-Operative Day: 1 Day Post-Op  Subjective:      History of Present Illness:  Liz Coto is a 64 y.o. [id number] who is admitted for post operative care, status post HOLLY/BSO/lysis of adhesions/omentectomy /lymphadenectomoy and debulking of neoplasm. Surgical oncology was also consulted intra operatively to help assist in excision of portion of the small bowel.       Hospital Course:  2019 - NAEON. Patient reports she was able to tolerate clear diet without nausea or vomiting. Pain well controlled on scheduled pain medications. 1x flatus, no BMs. Harry in place, draining clear urine. Has not ambulated yet.       Interval History:   NAEON. Patient reports pain has been well controlled on scheduled pain medications, has not required any PRN analgesics. Was able to tolerate clear liquid diet without any nausea or vomiting. Requesting regular diet this AM as patient feels hungry. Has passed gas x1 since surgery, no BMs. Has not been ambulating given harry is still in place, desires removal this AM. No other issues of note.     Scheduled Meds:   enoxaparin  40 mg Subcutaneous Daily    famotidine  20 mg Oral BID    ibuprofen  600 mg Oral Q6H    ketorolac  30 mg Intravenous Q6H    mupirocin  1 g Nasal BID    senna-docusate 8.6-50 mg  1 tablet Oral BID     Continuous Infusions:   dextrose 5 % and 0.45  % NaCl with KCl 20 mEq 75 mL/hr at 11/05/19 0056     PRN Meds:bisacodyl, diphenhydrAMINE, diphenhydrAMINE, HYDROmorphone, ondansetron, oxyCODONE, oxyCODONE, promethazine (PHENERGAN) IVPB    Review of patient's allergies indicates:  No Known Allergies    Objective:     Vital Signs (Most Recent):  Temp: 98.6 °F (37 °C) (11/05/19 0342)  Pulse: 70 (11/05/19 0342)  Resp: 18 (11/05/19 0342)  BP: 100/64 (11/05/19 0342)  SpO2: 95 % (11/05/19 0342) Vital Signs (24h Range):  Temp:  [97 °F (36.1 °C)-99 °F (37.2 °C)] 98.6 °F (37 °C)  Pulse:  [65-95] 70  Resp:  [9-20] 18  SpO2:  [94 %-100 %] 95 %  BP: (100-150)/() 100/64     Weight: 61.7 kg (136 lb)  Body mass index is 23.34 kg/m².    Intake/Output - Last 3 Shifts       11/03 0700 - 11/04 0659 11/04 0700 - 11/05 0659    I.V. (mL/kg)  3407.5 (55.2)    Total Intake(mL/kg)  3407.5 (55.2)    Urine (mL/kg/hr)  1400 (0.9)    Total Output  1400    Net  +2007.5                   Physical Exam:   Constitutional: She is oriented to person, place, and time. She appears well-developed and well-nourished. No distress.       Cardiovascular: Normal rate and regular rhythm.     Pulmonary/Chest: Effort normal. No respiratory distress.        Abdominal: Soft. She exhibits no distension. Abdominal incision: midline vertical incision with bandage overlying, appears clean/dry/intact. There is no tenderness (appropriate post operative tenderness).                 Neurological: She is alert and oriented to person, place, and time. No cranial nerve deficit.    Skin: Skin is warm and dry. She is not diaphoretic.    Psychiatric: She has a normal mood and affect. Her behavior is normal. Judgment and thought content normal.       Lines/Drains/Airways     Drain                 Urethral Catheter 11/04/19 0879 Non-latex;Straight-tip 16 Fr. less than 1 day          Peripheral Intravenous Line                 Peripheral IV - Single Lumen 11/04/19 0812 20 G Right Hand less than 1 day         Peripheral IV  - Single Lumen 11/04/19 0848 18 G Left Hand less than 1 day              Laboratory:  CBC:   Recent Labs   Lab 11/05/19  0455   WBC 12.59   HGB 11.1*   HCT 32.7*        UOP: 1.01 cc/kg/hr over pas 12 hours    Assessment/Plan:     S/P abdominal hysterectomy and left salpingo-oophorectomy, omentectomy, LND  - continue routine postop care.   - continue pain control with tordaol>ibuprofen/percocet/Dilaudid prn for breakthrough  - continue clear diet > advance as tolerated; IVFs (D5 1/2 NS w/ KCl @ 75cc/hr with saline lock once tolerating PO).  - ambulate TID. IS bedside.  - Harry in place. Will discontinue harry in am with passive VT.   - H/h: 11/32/275  - DVT ppx: TEDs/SCDS and lovenox, to start today          VTE Risk Mitigation (From admission, onward)         Ordered     enoxaparin injection 40 mg  Daily      11/04/19 115                Was harry catheter removed? No: however nursing staff will remove this AM    Dispo: anticipate discharge after patient meets all post operative milestones, likely POD#2-4    Camille Perez MD  Gynecologic Oncology  [facility]

## 2019-11-05 NOTE — SUBJECTIVE & OBJECTIVE
Interval History:   NAEON. Patient reports pain has been well controlled on scheduled pain medications, has not required any PRN analgesics. Was able to tolerate clear liquid diet without any nausea or vomiting. Requesting regular diet this AM as patient feels hungry. Has passed gas x1 since surgery, no BMs. Has not been ambulating given harry is still in place, desires removal this AM. No other issues of note.     Scheduled Meds:   enoxaparin  40 mg Subcutaneous Daily    famotidine  20 mg Oral BID    ibuprofen  600 mg Oral Q6H    ketorolac  30 mg Intravenous Q6H    mupirocin  1 g Nasal BID    senna-docusate 8.6-50 mg  1 tablet Oral BID     Continuous Infusions:   dextrose 5 % and 0.45 % NaCl with KCl 20 mEq 75 mL/hr at 11/05/19 0056     PRN Meds:bisacodyl, diphenhydrAMINE, diphenhydrAMINE, HYDROmorphone, ondansetron, oxyCODONE, oxyCODONE, promethazine (PHENERGAN) IVPB    Review of patient's allergies indicates:  No Known Allergies    Objective:     Vital Signs (Most Recent):  Temp: 98.6 °F (37 °C) (11/05/19 0342)  Pulse: 70 (11/05/19 0342)  Resp: 18 (11/05/19 0342)  BP: 100/64 (11/05/19 0342)  SpO2: 95 % (11/05/19 0342) Vital Signs (24h Range):  Temp:  [97 °F (36.1 °C)-99 °F (37.2 °C)] 98.6 °F (37 °C)  Pulse:  [65-95] 70  Resp:  [9-20] 18  SpO2:  [94 %-100 %] 95 %  BP: (100-150)/() 100/64     Weight: 61.7 kg (136 lb)  Body mass index is 23.34 kg/m².    Intake/Output - Last 3 Shifts       11/03 0700 - 11/04 0659 11/04 0700 - 11/05 0659    I.V. (mL/kg)  3407.5 (55.2)    Total Intake(mL/kg)  3407.5 (55.2)    Urine (mL/kg/hr)  1400 (0.9)    Total Output  1400    Net  +2007.5                   Physical Exam:   Constitutional: She is oriented to person, place, and time. She appears well-developed and well-nourished. No distress.       Cardiovascular: Normal rate and regular rhythm.     Pulmonary/Chest: Effort normal. No respiratory distress.        Abdominal: Soft. She exhibits no distension. Abdominal  incision: midline vertical incision with bandage overlying, appears clean/dry/intact. There is no tenderness (appropriate post operative tenderness).                 Neurological: She is alert and oriented to person, place, and time. No cranial nerve deficit.    Skin: Skin is warm and dry. She is not diaphoretic.    Psychiatric: She has a normal mood and affect. Her behavior is normal. Judgment and thought content normal.       Lines/Drains/Airways     Drain                 Urethral Catheter 11/04/19 0845 Non-latex;Straight-tip 16 Fr. less than 1 day          Peripheral Intravenous Line                 Peripheral IV - Single Lumen 11/04/19 0812 20 G Right Hand less than 1 day         Peripheral IV - Single Lumen 11/04/19 0848 18 G Left Hand less than 1 day              Laboratory:  CBC:   Recent Labs   Lab 11/05/19  0455   WBC 12.59   HGB 11.1*   HCT 32.7*        UOP: 1.01 cc/kg/hr over pas 12 hours

## 2019-11-05 NOTE — OP NOTE
DATE OF PROCEDURE:  11/04/2019    PREOPERATIVE DIAGNOSIS:  Right adnexal mass with elevated CA-125.    POSTOPERATIVE DIAGNOSES:  1.  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  2.  Dense small bowel adhesions to right adnexal mass.  3.  Aortocaval sherry mass.    PROCEDURES:  1.  Exploratory laparotomy.  2.  Total abdominal hysterectomy with bilateral salpingo-oophorectomy.  3.  Lysis of small bowel adhesions.  4. Omentectomy   5.  Resection of aortocaval sherry mass with Dr. Clarence Colmenares.  6.  Small bowel resection with side-to-side and then functional end-to-end small   bowel resection with Dr. Clarence Colmenares.    SURGEON:  Pérez Shukla M.D.    FIRST ASSISTANT:  Shaka Arana M.D., (RES)    SECOND ASSISTANT:  Camille Perez M.D.    ANESTHESIA:  GETA.    ESTIMATED BLOOD LOSS:  200 mL.    IV FLUIDS:  2000 mL.    URINE OUTPUT:  350 mL.    OPERATIVE HISTORY:  This is a 64-year-old patient who presented to [facility] ED with abdominal pain on 10/05/2019.  CT scan showed thick walled   appearance of the duodenum and proximal jejunum suggestive of enteritis.  There   was also a pelvic mass with mural wall nodularity measuring 9 x 6 x 9 cm.    CA-125 is elevated at 1097.    OPERATIVE FINDINGS:  Present arising in the right adnexa is a 10 cm mass.  There   are several loops of small bowel that were densely adherent to it.  In the   course of taking these down, serosal injuries occurred, subsequently,   necessitating a small bowel resection.  The left tube and ovary are normal.  The   right ovary is normal and this mass appears to be rising more from the superior   aspect of the ovary or possibly fallopian tube.  There was also a periaortic sherry mass   approximately 3 x 2 cm.  There was no other evidence for metastatic disease.    All other peritoneal surfaces were smooth.  Both diaphragms are smooth.  Spleen   is normal.  Liver and gallbladder are normal to palpation.  Omentum  appears   normal other than omentum that was adhered to the mass itself.  Small bowel was   run throughout its entire length and was otherwise normal other than as noted   above.    DESCRIPTION OF OPERATIVE PROCEDURE:  The patient was brought to the Operating   Room after induction with general anesthesia, [facility].    The vulva and vagina were prepped with Betadine scrub and solution.  Sterile   under buttocks drape was placed and a Bah catheter was placed sterilely.  The   abdomen was prepped with ChloraPrep and the patient was sterilely draped.  A   midline abdominal incision was made from the pubic symphysis to above the   umbilicus.  This was carried down to the fascia.  The fascia was incised in the   midline.  Peritoneum was tented up with hemostats and incised with Metzenbaum   scissors and we entered into the peritoneal cavity.  There was no ascites.    Cytologic washings were now taken.    At this point, the incision was extended further superiorly to give us adequate   room to work in as there was omentum adhered to the mass as well as several   loops of small bowel.    Bookwalter retractor was brought into position.  We now came across the distal   omentum leaving it adherent to the adnexal mass.  This was done with the EnSeal   device.    I now began to dissect several loops of small bowel away from the mass itself   and in the course of doing so, entered into the mass itself with finding of clear yellowish   fluid.    The small bowel was dissected free.  There were several serosal injuries and   these were addressed at the end.    At this point, the infundibulopelvic ligament on the right is identified.  The   anterior leaf of the broad ligament was opened.  The round ligament was   transected with the Bovie unit.  The right ureter was identified and the   infundibulopelvic ligament is clamped, cut, and doubly ligated with 0 Vicryl   ties.  The anterior cul-de-sac peritoneum  was incised and the bladder was   dissected downward off the cervix.  The round ligament on the left was   transected with the Bovie unit.  The anterior leaf of the broad ligament was   opened.  The left ureter was identified.  The left infundibulopelvic ligament is   clamped, cut, and doubly ligated with 0 Vicryl ties.    The uterine vessels were skeletonized, clamped, cut, and suture ligated.  The   right adnexa was  from the uterus and sent for frozen section.  We   continued down the sides of the cervix until we entered into the vagina, first   on the right and then on the left-hand side.  The intervening vagina is cut and   the uterus, cervix, left tube, and ovary are removed.  The angles of the vagina   were secured with interrupted 0 Vicryl suture in a Silvia fashion.  The   intervening vagina was then closed with interrupted 0 Vicryl suture.    Attention was now directed to the pelvis.  We began the pelvic lymphadenectomy   resecting the lymph nodes along from the right mid common iliac artery   inferiorly to the deep circumflex iliac vein.  The obturator space is opened and   those nodes anterior to the nerve were removed.  Identical lymphadenectomy was   then performed on the left hand side.    I also began to dissect the omentum away from the ascending and transverse   colon.  Individual pedicles were taken with the EnSeal device and remaining   omentum was resected.    At this point, I had previously called Dr. Clarence Colmenares and he comes into the   Operating Room.  I showed him the several loops of small bowel and given their   close proximity, we decided to proceed with a small bowel   resection.  There were also areas where there was remaining ovarian tissue on   the bowel wall and any attempt to resect this would have resulted in further   serosal injury if not an enterotomy.    At this point, as I was running the small bowel, I also identified an aortocaval   mass.  The peritoneum was now  incised over this and we began to dissect this   aortocaval sherry mass without injury to the aorta or vena cava.  A piece of   Nu-Knit was placed in this area and the peritoneum was closed with a running 2-0   Vicryl suture.    We now began the small bowel resection, which he will dictate.    At this point, the pelvis was again inspected and irrigated.  There was no   active bleeding.  Four half sheets of Seprafilm were placed into the pelvis.    The fascia and peritoneum were then infiltrated with 266 mg of Exparel.  This is   20 mL diluted in 100 mL of injectable normal saline; 60 mL was injected on each   side.    No additional Seprafilm was placed because of the small bowel resection.    The fascia and peritoneum were now closed with a bulk closure with a #1 looped   PDS suture beginning superiorly and inferiorly and tying approximately mid   incision.  Subcutaneous tissues were irrigated and reapproximated with a running   2-0 plain catgut.  The skin was then closed with a running 4-0 Monocryl suture   in a subcuticular closure.    The patient was now awakened and taken to the Recovery Room in stable condition.    Lap, needle, sponge, and instrument count were correct.      JAZMYN/IVONNE  dd: 11/04/2019 15:38:20 (CST)  td: 11/04/2019 19:00:47 (CST)  Doc ID   #[id number]  Job ID #[id number]    CC:

## 2019-11-05 NOTE — PLAN OF CARE
Pt AAOx4, resting in bed comfortably. Midline abd dressing remains CDI. Pt's pain adequately managed by scheduled Toradol. Denies any nausea at this time. Tolerating clear liquids. Bah cath in place draining clear yellow urine, will d/c in AM. VSS. Remained afebrile throughout shift with no falls or injuries. Pt with nonskid footwear on and bed locked and in lowest position with bed rails up x2. Call light is within reach. Teds/SCDs in place. Will continue to monitor.

## 2019-11-06 PROCEDURE — 20600001 HC STEP DOWN PRIVATE ROOM

## 2019-11-06 PROCEDURE — 99024 POSTOP FOLLOW-UP VISIT: CPT | Mod: ,,, | Performed by: OBSTETRICS & GYNECOLOGY

## 2019-11-06 PROCEDURE — 25000003 PHARM REV CODE 250: Performed by: STUDENT IN AN ORGANIZED HEALTH CARE EDUCATION/TRAINING PROGRAM

## 2019-11-06 PROCEDURE — 63600175 PHARM REV CODE 636 W HCPCS: Performed by: STUDENT IN AN ORGANIZED HEALTH CARE EDUCATION/TRAINING PROGRAM

## 2019-11-06 PROCEDURE — 99024 PR POST-OP FOLLOW-UP VISIT: ICD-10-PCS | Mod: ,,, | Performed by: OBSTETRICS & GYNECOLOGY

## 2019-11-06 RX ORDER — OXYCODONE HYDROCHLORIDE 5 MG/1
5 TABLET ORAL EVERY 6 HOURS PRN
Qty: 30 TABLET | Refills: 0 | Status: SHIPPED | OUTPATIENT
Start: 2019-11-06 | End: 2020-02-18 | Stop reason: SDUPTHER

## 2019-11-06 RX ORDER — OXYCODONE HYDROCHLORIDE 5 MG/1
5 TABLET ORAL EVERY 6 HOURS PRN
Qty: 30 TABLET | Refills: 0 | Status: SHIPPED | OUTPATIENT
Start: 2019-11-06 | End: 2019-11-06

## 2019-11-06 RX ADMIN — FAMOTIDINE 20 MG: 20 TABLET ORAL at 08:11

## 2019-11-06 RX ADMIN — MUPIROCIN 1 G: 20 OINTMENT TOPICAL at 09:11

## 2019-11-06 RX ADMIN — IBUPROFEN 600 MG: 600 TABLET, FILM COATED ORAL at 05:11

## 2019-11-06 RX ADMIN — SENNOSIDES AND DOCUSATE SODIUM 1 TABLET: 8.6; 5 TABLET ORAL at 08:11

## 2019-11-06 RX ADMIN — HYDROMORPHONE HYDROCHLORIDE 1 MG: 1 INJECTION, SOLUTION INTRAMUSCULAR; INTRAVENOUS; SUBCUTANEOUS at 12:11

## 2019-11-06 RX ADMIN — DEXTROSE MONOHYDRATE, SODIUM CHLORIDE, AND POTASSIUM CHLORIDE: 50; 4.5; 1.49 INJECTION, SOLUTION INTRAVENOUS at 11:11

## 2019-11-06 RX ADMIN — ENOXAPARIN SODIUM 40 MG: 100 INJECTION SUBCUTANEOUS at 05:11

## 2019-11-06 RX ADMIN — IBUPROFEN 600 MG: 600 TABLET, FILM COATED ORAL at 12:11

## 2019-11-06 RX ADMIN — OXYCODONE HYDROCHLORIDE 10 MG: 10 TABLET ORAL at 08:11

## 2019-11-06 RX ADMIN — SENNOSIDES AND DOCUSATE SODIUM 1 TABLET: 8.6; 5 TABLET ORAL at 09:11

## 2019-11-06 RX ADMIN — OXYCODONE HYDROCHLORIDE 5 MG: 5 TABLET ORAL at 12:11

## 2019-11-06 RX ADMIN — MUPIROCIN 1 G: 20 OINTMENT TOPICAL at 08:11

## 2019-11-06 RX ADMIN — IBUPROFEN 600 MG: 600 TABLET, FILM COATED ORAL at 11:11

## 2019-11-06 RX ADMIN — ONDANSETRON 8 MG: 8 TABLET, ORALLY DISINTEGRATING ORAL at 11:11

## 2019-11-06 RX ADMIN — DEXTROSE MONOHYDRATE, SODIUM CHLORIDE, AND POTASSIUM CHLORIDE: 50; 4.5; 1.49 INJECTION, SOLUTION INTRAVENOUS at 09:11

## 2019-11-06 RX ADMIN — FAMOTIDINE 20 MG: 20 TABLET ORAL at 09:11

## 2019-11-06 RX ADMIN — IBUPROFEN 600 MG: 600 TABLET, FILM COATED ORAL at 06:11

## 2019-11-06 NOTE — SUBJECTIVE & OBJECTIVE
Interval History:   NAEON. She reports mild to moderate pain adequately controlled by PO and IV pain medications - required dilaudid 1 mg x1 overnight. She has been able to tolerate clear liwuid diet without issue; desires solid PO intake. She denies flatus. Urinating without issue. Ambulating in the halls and in her room without issue.     Scheduled Meds:   enoxaparin  40 mg Subcutaneous Daily    famotidine  20 mg Oral BID    ibuprofen  600 mg Oral Q6H    mupirocin  1 g Nasal BID    senna-docusate 8.6-50 mg  1 tablet Oral BID     Continuous Infusions:   dextrose 5 % and 0.45 % NaCl with KCl 20 mEq 75 mL/hr at 11/05/19 1757     PRN Meds:bisacodyl, diphenhydrAMINE, diphenhydrAMINE, HYDROmorphone, ondansetron, oxyCODONE, oxyCODONE, promethazine (PHENERGAN) IVPB    Review of patient's allergies indicates:  No Known Allergies    Objective:     Vital Signs (Most Recent):  Temp: 97.5 °F (36.4 °C) (11/06/19 0342)  Pulse: 62 (11/06/19 0342)  Resp: 18 (11/06/19 0342)  BP: 120/79 (11/06/19 0342)  SpO2: (!) 94 % (11/06/19 0342) Vital Signs (24h Range):  Temp:  [97 °F (36.1 °C)-99 °F (37.2 °C)] 97.5 °F (36.4 °C)  Pulse:  [61-79] 62  Resp:  [17-18] 18  SpO2:  [91 %-99 %] 94 %  BP: ()/(68-84) 120/79     Weight: 61.7 kg (136 lb)  Body mass index is 23.34 kg/m².    Intake/Output - Last 3 Shifts       11/04 0700 - 11/05 0659 11/05 0700 - 11/06 0659    P.O.  720    I.V. (mL/kg) 3407.5 (55.2)     Total Intake(mL/kg) 3407.5 (55.2) 720 (11.7)    Urine (mL/kg/hr) 1400 (0.9)     Total Output 1400     Net +2007.5 +720          Urine Occurrence  1000 x          Physical Exam:   Constitutional: She is oriented to person, place, and time. She appears well-developed and well-nourished. No distress.       Cardiovascular: Normal rate and regular rhythm.     Pulmonary/Chest: Effort normal. No respiratory distress.        Abdominal: Soft. She exhibits no distension. There is no tenderness.   Midline abdominal bandage in place which  appears clean/dry/intact. No evidence of shadowing                 Neurological: She is alert and oriented to person, place, and time. No cranial nerve deficit.    Skin: She is not diaphoretic.    Psychiatric: She has a normal mood and affect. Her behavior is normal. Judgment and thought content normal.       Lines/Drains/Airways     Peripheral Intravenous Line                 Peripheral IV - Single Lumen 11/04/19 0812 20 G Right Hand 1 day         Peripheral IV - Single Lumen 11/04/19 0848 18 G Left Hand 1 day              UOP: 1.35 cc/kg/hr over past 12 hours

## 2019-11-06 NOTE — PLAN OF CARE
Plan of care reviewed with patient. Pt ambulated in hallway 3 times today independently. AAOX4. Pt had 1 c/o pain which was resolved with medication. Started a regular diet today in which she did not eat much, no c/o of nausea or abdominal pain from eating. Pt has remained free from injury this shift. Bed in low locked position. Call light and personal belongings within reach. Side rails up x2. Nonskid socks in place. Pt instructed to call with any needs. Will continue to monitor.

## 2019-11-06 NOTE — PROGRESS NOTES
[facility]  Gynecologic Oncology  Progress Note      Patient Name: Liz Coto  MRN: [id number]  Admission Date: 2019  Hospital Length of Stay: 2 days  Attending Provider: Pérez Shukla MD  Primary Care Provider: Griselda Alegre MD  Principal Problem: Pelvic mass in female    Follow-up For: Procedure(s) (LRB):  SALPINGO-OOPHORECTOMY, BILATERAL (N/A)  DEBULKING, NEOPLASM (N/A)  LYSIS, ADHESIONS (N/A)  HYSTERECTOMY, TOTAL, ABDOMINAL (N/A)  OMENTECTOMY (N/A)  EXCISION, SMALL INTESTINE (N/A)  LYMPHADENECTOMY, PARA-AORTIC (N/A)  LYMPHADENECTOMY, PELVIS (Bilateral)  Post-Operative Day: 2 Days Post-Op  Subjective:      History of Present Illness:  Liz Coto is a 64 y.o. [id number] who is admitted for post operative care, status post HOLLY/BSO/lysis of adhesions/omentectomy /lymphadenectomoy and debulking of neoplasm. Surgical oncology was also consulted intra operatively to help assist in excision of portion of the small bowel.       Hospital Course:  2019 - NAEON. Patient reports she was able to tolerate clear diet without nausea or vomiting. Pain well controlled on scheduled pain medications. 1x flatus, no BMs. Bah in place, draining clear urine. Has not ambulated yet.   2019 - Stable. Reports some pain with increased movement yesterday. Pain well controlled on medications. No flatus, no BMs. Urinating without issue. Ambulating in the halls and in her room without difficulty      Interval History:   NAEON. She reports mild to moderate pain adequately controlled by PO and IV pain medications - required dilaudid 1 mg x1 overnight. She has been able to tolerate clear liwuid diet without issue; desires solid PO intake. She denies flatus. Urinating without issue. Ambulating in the halls and in her room without issue.     Scheduled Meds:   enoxaparin  40 mg Subcutaneous Daily    famotidine  20 mg Oral BID    ibuprofen  600 mg Oral Q6H    mupirocin  1 g Nasal  BID    senna-docusate 8.6-50 mg  1 tablet Oral BID     Continuous Infusions:   dextrose 5 % and 0.45 % NaCl with KCl 20 mEq 75 mL/hr at 11/05/19 1757     PRN Meds:bisacodyl, diphenhydrAMINE, diphenhydrAMINE, HYDROmorphone, ondansetron, oxyCODONE, oxyCODONE, promethazine (PHENERGAN) IVPB    Review of patient's allergies indicates:  No Known Allergies    Objective:     Vital Signs (Most Recent):  Temp: 97.5 °F (36.4 °C) (11/06/19 0342)  Pulse: 62 (11/06/19 0342)  Resp: 18 (11/06/19 0342)  BP: 120/79 (11/06/19 0342)  SpO2: (!) 94 % (11/06/19 0342) Vital Signs (24h Range):  Temp:  [97 °F (36.1 °C)-99 °F (37.2 °C)] 97.5 °F (36.4 °C)  Pulse:  [61-79] 62  Resp:  [17-18] 18  SpO2:  [91 %-99 %] 94 %  BP: ()/(68-84) 120/79     Weight: 61.7 kg (136 lb)  Body mass index is 23.34 kg/m².    Intake/Output - Last 3 Shifts       11/04 0700 - 11/05 0659 11/05 0700 - 11/06 0659    P.O.  720    I.V. (mL/kg) 3407.5 (55.2)     Total Intake(mL/kg) 3407.5 (55.2) 720 (11.7)    Urine (mL/kg/hr) 1400 (0.9)     Total Output 1400     Net +2007.5 +720          Urine Occurrence  1000 x          Physical Exam:   Constitutional: She is oriented to person, place, and time. She appears well-developed and well-nourished. No distress.       Cardiovascular: Normal rate and regular rhythm.     Pulmonary/Chest: Effort normal. No respiratory distress.        Abdominal: Soft. She exhibits no distension. There is no tenderness.   Midline abdominal bandage in place which appears clean/dry/intact. No evidence of shadowing                 Neurological: She is alert and oriented to person, place, and time. No cranial nerve deficit.    Skin: She is not diaphoretic.    Psychiatric: She has a normal mood and affect. Her behavior is normal. Judgment and thought content normal.       Lines/Drains/Airways     Peripheral Intravenous Line                 Peripheral IV - Single Lumen 11/04/19 0812 20 G Right Hand 1 day         Peripheral IV - Single Lumen  11/04/19 0848 18 G Left Hand 1 day              UOP: 1.35 cc/kg/hr over past 12 hours    Assessment/Plan:     S/P abdominal hysterectomy and left salpingo-oophorectomy, omentectomy, LND  - continue routine postop care.   - continue pain control with tordaol>ibuprofen/percocet/Dilaudid prn for breakthrough  - continue clear diet > advance as tolerated; IVFs (D5 1/2 NS w/ KCl @ 75cc/hr with saline lock once tolerating PO).  - ambulate TID. IS bedside.  - UOP adequate  - H/h: 11/32/275  - DVT ppx: TEDs/SCDS and lovenox          VTE Risk Mitigation (From admission, onward)         Ordered     enoxaparin injection 40 mg  Daily      11/04/19 1159                Was harry catheter removed? Yes    Camille Perez MD  Gynecologic Oncology  [facility]

## 2019-11-06 NOTE — PLAN OF CARE
Plan of care reviewed with pt. Pt is A & O x4, VSS, afebrile. Pt is independent. No acute events overnight. Safety precautions in place. Will continue to monitor.

## 2019-11-06 NOTE — ASSESSMENT & PLAN NOTE
- continue routine postop care.   - continue pain control with tordaol>ibuprofen/percocet/Dilaudid prn for breakthrough  - continue clear diet > advance as tolerated; IVFs (D5 1/2 NS w/ KCl @ 75cc/hr with saline lock once tolerating PO).  - ambulate TID. IS bedside.  - UOP adequate  - H/h: 11/32/275  - DVT ppx: TEDs/SCDS and lovenox

## 2019-11-06 NOTE — ASSESSMENT & PLAN NOTE
64 y. Female s/p Small bowel resection with side to side anastomosis + Aortocaval Lymphadenectomy POD 2 Doing well.     - CLD advance as tolerated.   - PRN pain control  - PRN nausea and vomit.   - Will follow  - Call if you have any questions.

## 2019-11-06 NOTE — PROGRESS NOTES
[facility]  General Surgery  Progress Note      Interval History:   NAEON.   Adequate pain control.   Tolerating a CLD without any nausea or vomit   Passing gas    Scheduled Meds:   enoxaparin  40 mg Subcutaneous Daily    famotidine  20 mg Oral BID    ibuprofen  600 mg Oral Q6H    mupirocin  1 g Nasal BID    senna-docusate 8.6-50 mg  1 tablet Oral BID     Continuous Infusions:   dextrose 5 % and 0.45 % NaCl with KCl 20 mEq 75 mL/hr at 11/05/19 1757     PRN Meds:bisacodyl, diphenhydrAMINE, diphenhydrAMINE, HYDROmorphone, ondansetron, oxyCODONE, oxyCODONE, promethazine (PHENERGAN) IVPB    Review of patient's allergies indicates:  No Known Allergies    Objective:     Vital Signs (Most Recent):  Temp: 97.5 °F (36.4 °C) (11/06/19 0342)  Pulse: 62 (11/06/19 0342)  Resp: 18 (11/06/19 0342)  BP: 120/79 (11/06/19 0342)  SpO2: (!) 94 % (11/06/19 0342) Vital Signs (24h Range):  Temp:  [97 °F (36.1 °C)-99 °F (37.2 °C)] 97.5 °F (36.4 °C)  Pulse:  [61-79] 62  Resp:  [17-18] 18  SpO2:  [91 %-99 %] 94 %  BP: ()/(68-84) 120/79     Weight: 61.7 kg (136 lb)  Body mass index is 23.34 kg/m².    Intake/Output - Last 3 Shifts       11/04 0700 - 11/05 0659 11/05 0700 - 11/06 0659    P.O.  720    I.V. (mL/kg) 3407.5 (55.2)     Total Intake(mL/kg) 3407.5 (55.2) 720 (11.7)    Urine (mL/kg/hr) 1400 (0.9)     Total Output 1400     Net +2007.5 +720          Urine Occurrence  1000 x             Physical Exam:   Constitutional: She is oriented to person, place, and time. She appears well-developed and well-nourished. No distress.       Cardiovascular: Normal rate and regular rhythm.     Pulmonary/Chest: Effort normal. No respiratory distress.        Abdominal: Soft. She exhibits no distension. Abdominal incision: midline vertical incision with bandage overlying, appears clean/dry/intact. There is no tenderness (appropriate post operative tenderness).                 Neurological: She is alert and oriented to person,  place, and time. No cranial nerve deficit.    Skin: Skin is warm and dry. She is not diaphoretic.    Psychiatric: She has a normal mood and affect. Her behavior is normal. Judgment and thought content normal.       Lines/Drains/Airways     Peripheral Intravenous Line                 Peripheral IV - Single Lumen 11/04/19 0812 20 G Right Hand 1 day         Peripheral IV - Single Lumen 11/04/19 0848 18 G Left Hand 1 day              Laboratory:  CBC:   Recent Labs   Lab 11/05/19  0455   WBC 12.59   HGB 11.1*   HCT 32.7*        UOP: 1.01 cc/kg/hr over pas 12 hours    Physical Exam   Constitutional: She is oriented to person, place, and time. She appears well-developed and well-nourished. No distress.   Cardiovascular: Normal rate and regular rhythm.   Pulmonary/Chest: Effort normal. No respiratory distress.   Abdominal: Soft. She exhibits no distension. There is no tenderness (appropriate post operative tenderness).   Neurological: She is alert and oriented to person, place, and time. No cranial nerve deficit.   Skin: Skin is warm and dry. She is not diaphoretic.   Psychiatric: She has a normal mood and affect. Her behavior is normal. Judgment and thought content normal.   Vitals reviewed.      Review of Systems      Assessment/Plan:     S/P abdominal hysterectomy and left salpingo-oophorectomy, omentectomy, LND  64 y. Female s/p Small bowel resection with side to side anastomosis + Aortocaval Lymphadenectomy POD 2 Doing well.     - CLD advance as tolerated.   - PRN pain control  - PRN nausea and vomit.   - Will follow  - Call if you have any questions.       VTE Risk Mitigation (From admission, onward)         Ordered     enoxaparin injection 40 mg  Daily      11/04/19 3045                Samir Waller MD  General Surgery  [facility]

## 2019-11-06 NOTE — NURSING
Patient up ambulating to restroom and walking around room.  Voiding with assistance to restroom post void residual for 12 hour shift 450ml.  Surgical site dressing intact with dressing clean dry and intact.  Patient c/o pain earlier was given 1 mg dilaudid per Rn from complaints of abdominal pain 8/10 om pain scale.  Reassed for pain an hour later an patient pain was resolved.  Assessment ongoing.

## 2019-11-06 NOTE — SUBJECTIVE & OBJECTIVE
Interval History:   NAEON.   Adequate pain control.   Tolerating a CLD without any nausea or vomit   Passing gas    Scheduled Meds:   enoxaparin  40 mg Subcutaneous Daily    famotidine  20 mg Oral BID    ibuprofen  600 mg Oral Q6H    mupirocin  1 g Nasal BID    senna-docusate 8.6-50 mg  1 tablet Oral BID     Continuous Infusions:   dextrose 5 % and 0.45 % NaCl with KCl 20 mEq 75 mL/hr at 11/05/19 1757     PRN Meds:bisacodyl, diphenhydrAMINE, diphenhydrAMINE, HYDROmorphone, ondansetron, oxyCODONE, oxyCODONE, promethazine (PHENERGAN) IVPB    Review of patient's allergies indicates:  No Known Allergies    Objective:     Vital Signs (Most Recent):  Temp: 97.5 °F (36.4 °C) (11/06/19 0342)  Pulse: 62 (11/06/19 0342)  Resp: 18 (11/06/19 0342)  BP: 120/79 (11/06/19 0342)  SpO2: (!) 94 % (11/06/19 0342) Vital Signs (24h Range):  Temp:  [97 °F (36.1 °C)-99 °F (37.2 °C)] 97.5 °F (36.4 °C)  Pulse:  [61-79] 62  Resp:  [17-18] 18  SpO2:  [91 %-99 %] 94 %  BP: ()/(68-84) 120/79     Weight: 61.7 kg (136 lb)  Body mass index is 23.34 kg/m².    Intake/Output - Last 3 Shifts       11/04 0700 - 11/05 0659 11/05 0700 - 11/06 0659    P.O.  720    I.V. (mL/kg) 3407.5 (55.2)     Total Intake(mL/kg) 3407.5 (55.2) 720 (11.7)    Urine (mL/kg/hr) 1400 (0.9)     Total Output 1400     Net +2007.5 +720          Urine Occurrence  1000 x             Physical Exam:   Constitutional: She is oriented to person, place, and time. She appears well-developed and well-nourished. No distress.       Cardiovascular: Normal rate and regular rhythm.     Pulmonary/Chest: Effort normal. No respiratory distress.        Abdominal: Soft. She exhibits no distension. Abdominal incision: midline vertical incision with bandage overlying, appears clean/dry/intact. There is no tenderness (appropriate post operative tenderness).                 Neurological: She is alert and oriented to person, place, and time. No cranial nerve deficit.    Skin: Skin is warm  and dry. She is not diaphoretic.    Psychiatric: She has a normal mood and affect. Her behavior is normal. Judgment and thought content normal.       Lines/Drains/Airways     Peripheral Intravenous Line                 Peripheral IV - Single Lumen 11/04/19 0812 20 G Right Hand 1 day         Peripheral IV - Single Lumen 11/04/19 0848 18 G Left Hand 1 day              Laboratory:  CBC:   Recent Labs   Lab 11/05/19  0455   WBC 12.59   HGB 11.1*   HCT 32.7*        UOP: 1.01 cc/kg/hr over pas 12 hours    Physical Exam   Constitutional: She is oriented to person, place, and time. She appears well-developed and well-nourished. No distress.   Cardiovascular: Normal rate and regular rhythm.   Pulmonary/Chest: Effort normal. No respiratory distress.   Abdominal: Soft. She exhibits no distension. There is no tenderness (appropriate post operative tenderness).   Neurological: She is alert and oriented to person, place, and time. No cranial nerve deficit.   Skin: Skin is warm and dry. She is not diaphoretic.   Psychiatric: She has a normal mood and affect. Her behavior is normal. Judgment and thought content normal.   Vitals reviewed.      Review of Systems

## 2019-11-07 PROCEDURE — 63600175 PHARM REV CODE 636 W HCPCS: Performed by: STUDENT IN AN ORGANIZED HEALTH CARE EDUCATION/TRAINING PROGRAM

## 2019-11-07 PROCEDURE — 99024 POSTOP FOLLOW-UP VISIT: CPT | Mod: ,,, | Performed by: OBSTETRICS & GYNECOLOGY

## 2019-11-07 PROCEDURE — 99024 PR POST-OP FOLLOW-UP VISIT: ICD-10-PCS | Mod: ,,, | Performed by: OBSTETRICS & GYNECOLOGY

## 2019-11-07 PROCEDURE — 25000003 PHARM REV CODE 250: Performed by: STUDENT IN AN ORGANIZED HEALTH CARE EDUCATION/TRAINING PROGRAM

## 2019-11-07 PROCEDURE — 20600001 HC STEP DOWN PRIVATE ROOM

## 2019-11-07 RX ADMIN — SENNOSIDES AND DOCUSATE SODIUM 1 TABLET: 8.6; 5 TABLET ORAL at 09:11

## 2019-11-07 RX ADMIN — MUPIROCIN 1 G: 20 OINTMENT TOPICAL at 10:11

## 2019-11-07 RX ADMIN — FAMOTIDINE 20 MG: 20 TABLET ORAL at 09:11

## 2019-11-07 RX ADMIN — FAMOTIDINE 20 MG: 20 TABLET ORAL at 10:11

## 2019-11-07 RX ADMIN — SENNOSIDES AND DOCUSATE SODIUM 1 TABLET: 8.6; 5 TABLET ORAL at 10:11

## 2019-11-07 RX ADMIN — MUPIROCIN 1 G: 20 OINTMENT TOPICAL at 09:11

## 2019-11-07 RX ADMIN — PROMETHAZINE HYDROCHLORIDE 12.5 MG: 25 INJECTION INTRAMUSCULAR; INTRAVENOUS at 03:11

## 2019-11-07 RX ADMIN — ENOXAPARIN SODIUM 40 MG: 100 INJECTION SUBCUTANEOUS at 04:11

## 2019-11-07 NOTE — PLAN OF CARE
"Reviewed plan of care with pt at the beginning of the shift. Pt reported pain and n/v throughout the shift. Pt stated that she "felt better after throwing up". Vital signs were stable throughout the shift.Fall precautions continued for pt. Bed locked and at lowest position. Call light within reach. Pt knows to call if assistance is needed.      "

## 2019-11-07 NOTE — PROGRESS NOTES
[facility]  Gynecologic Oncology  Progress Note      Patient Name: Liz Coto  MRN: [id number]  Admission Date: 2019  Hospital Length of Stay: 3 days  Attending Provider: Pérez Shukla MD  Primary Care Provider: Griselda Alegre MD  Principal Problem: S/P abdominal hysterectomy and left salpingo-oophorectomy    Follow-up For: Procedure(s) (LRB):  SALPINGO-OOPHORECTOMY, BILATERAL (N/A)  DEBULKING, NEOPLASM (N/A)  LYSIS, ADHESIONS (N/A)  HYSTERECTOMY, TOTAL, ABDOMINAL (N/A)  OMENTECTOMY (N/A)  EXCISION, SMALL INTESTINE (N/A)  LYMPHADENECTOMY, PARA-AORTIC (N/A)  LYMPHADENECTOMY, PELVIS (Bilateral)  Post-Operative Day: 3 Days Post-Op  Subjective:      History of Present Illness:  Liz Coto is a 64 y.o. [id number] who is admitted for post operative care, status post HOLLY/BSO/lysis of adhesions/omentectomy /lymphadenectomoy and debulking of neoplasm. Surgical oncology was also consulted intra operatively to help assist in excision of portion of the small bowel.       Hospital Course:  2019 - NAEON. Patient reports she was able to tolerate clear diet without nausea or vomiting. Pain well controlled on scheduled pain medications. 1x flatus, no BMs. Bah in place, draining clear urine. Has not ambulated yet.   2019 - Stable. Reports some pain with increased movement yesterday. Pain well controlled on medications. No flatus, no BMs. Urinating without issue. Ambulating in the halls and in her room without difficulty  2019- Stable. Patient reports 1x episode of vomit overnight however reports improvement after this event. Pain well controlled on medications. Passing flatus, no BMs. Urinating and ambulating in the halls without issue      Interval History:   Patient reports 1x episode of nausea and vomiting overnight. She states that at approx 2100, she received a medication (she is unsure of which one) which she took on an empty stomach. After this event,  she started to experience some nausea and received zofran disintegrating tablet, which made her symptoms even worse. At approx 0300 this AM, she had small volume emesis which improved her nausea. She has been able to sleep without issue since.    Otherwise, patient was advanced to full diet yesterday and only ate small amounts of green beans and prune juice. She believes the prune juice could have also made her nauseated. Her pain has been well controlled on PRN oral pain medications - she required oxy IR x1 overnight. She did have some increased abdominal pain after vomiting however this has also resolved. Urinating without issue and ambulating in the halls without difficulty. Passing flatus, no BMs.     Scheduled Meds:   enoxaparin  40 mg Subcutaneous Daily    famotidine  20 mg Oral BID    ibuprofen  600 mg Oral Q6H    mupirocin  1 g Nasal BID    senna-docusate 8.6-50 mg  1 tablet Oral BID     Continuous Infusions:  PRN Meds:bisacodyl, diphenhydrAMINE, diphenhydrAMINE, HYDROmorphone, ondansetron, oxyCODONE, oxyCODONE, promethazine (PHENERGAN) IVPB    Review of patient's allergies indicates:  No Known Allergies    Objective:     Vital Signs (Most Recent):  Temp: 98.3 °F (36.8 °C) (11/07/19 0428)  Pulse: 79 (11/07/19 0428)  Resp: 20 (11/07/19 0428)  BP: (!) 136/91 (11/07/19 0428)  SpO2: 96 % (11/07/19 0428) Vital Signs (24h Range):  Temp:  [97.7 °F (36.5 °C)-98.3 °F (36.8 °C)] 98.3 °F (36.8 °C)  Pulse:  [65-79] 79  Resp:  [14-20] 20  SpO2:  [94 %-98 %] 96 %  BP: (122-149)/(80-97) 136/91     Weight: 61.7 kg (136 lb)  Body mass index is 23.34 kg/m².    Intake/Output - Last 3 Shifts       11/05 0700 - 11/06 0659 11/06 0700 - 11/07 0659    P.O. 720 1200    I.V. (mL/kg)  3336.3 (54.1)    Total Intake(mL/kg) 720 (11.7) 4536.3 (73.5)    Urine (mL/kg/hr)  2200 (1.5)    Emesis/NG output  0    Total Output  2200    Net +720 +2336.3          Urine Occurrence 1000 x 900 x    Emesis Occurrence  1 x             Physical  Exam:   Constitutional: She is oriented to person, place, and time. She appears well-developed and well-nourished.    HENT:   Head: Normocephalic and atraumatic.      Cardiovascular: Normal rate and regular rhythm.     Pulmonary/Chest: Effort normal. No respiratory distress.        Abdominal: Soft. She exhibits no distension. Abdominal incision: midline vertical incision with bandage in place; appears clean/dry/intact. There is no tenderness (appropriate post operative tenderness).                 Neurological: She is alert and oriented to person, place, and time. No cranial nerve deficit.    Skin: Skin is warm and dry.    Psychiatric: She has a normal mood and affect. Her behavior is normal. Judgment and thought content normal.       Lines/Drains/Airways     Peripheral Intravenous Line                 Peripheral IV - Single Lumen 11/06/19 1300 Anterior;Left Forearm less than 1 day              UOP: 1.89 cc/kg/hr over past 12 hours    Assessment/Plan:     * S/P abdominal hysterectomy and left salpingo-oophorectomy, omentectomy, LND  - continue routine postop care.   - continue pain control with tordaol>ibuprofen/percocet/Dilaudid prn for breakthrough  - continue clear diet > advance as tolerated  - ambulate TID. IS bedside.  - UOP adequate  - H/h: 11/32/275  - DVT ppx: TEDs/SCDS and lovenox          VTE Risk Mitigation (From admission, onward)         Ordered     enoxaparin injection 40 mg  Daily      11/04/19 4846                Was harry catheter removed? Yes    Camille Perez MD  Gynecologic Oncology  [facility]

## 2019-11-07 NOTE — SUBJECTIVE & OBJECTIVE
Interval History:   Patient reports 1x episode of nausea and vomiting overnight. She states that at approx 2100, she received a medication (she is unsure of which one) which she took on an empty stomach. After this event, she started to experience some nausea and received zofran disintegrating tablet, which made her symptoms even worse. At approx 0300 this AM, she had small volume emesis which improved her nausea. She has been able to sleep without issue since.    Otherwise, patient was advanced to full diet yesterday and only ate small amounts of green beans and prune juice. She believes the prune juice could have also made her nauseated. Her pain has been well controlled on PRN oral pain medications - she required oxy IR x1 overnight. She did have some increased abdominal pain after vomiting however this has also resolved. Urinating without issue and ambulating in the halls without difficulty. Passing flatus, no BMs.     Scheduled Meds:   enoxaparin  40 mg Subcutaneous Daily    famotidine  20 mg Oral BID    ibuprofen  600 mg Oral Q6H    mupirocin  1 g Nasal BID    senna-docusate 8.6-50 mg  1 tablet Oral BID     Continuous Infusions:  PRN Meds:bisacodyl, diphenhydrAMINE, diphenhydrAMINE, HYDROmorphone, ondansetron, oxyCODONE, oxyCODONE, promethazine (PHENERGAN) IVPB    Review of patient's allergies indicates:  No Known Allergies    Objective:     Vital Signs (Most Recent):  Temp: 98.3 °F (36.8 °C) (11/07/19 0428)  Pulse: 79 (11/07/19 0428)  Resp: 20 (11/07/19 0428)  BP: (!) 136/91 (11/07/19 0428)  SpO2: 96 % (11/07/19 0428) Vital Signs (24h Range):  Temp:  [97.7 °F (36.5 °C)-98.3 °F (36.8 °C)] 98.3 °F (36.8 °C)  Pulse:  [65-79] 79  Resp:  [14-20] 20  SpO2:  [94 %-98 %] 96 %  BP: (122-149)/(80-97) 136/91     Weight: 61.7 kg (136 lb)  Body mass index is 23.34 kg/m².    Intake/Output - Last 3 Shifts       11/05 0700 - 11/06 0659 11/06 0700 - 11/07 0659    P.O. 720 1200    I.V. (mL/kg)  3336.3 (54.1)    Total  Intake(mL/kg) 720 (11.7) 4536.3 (73.5)    Urine (mL/kg/hr)  2200 (1.5)    Emesis/NG output  0    Total Output  2200    Net +720 +2336.3          Urine Occurrence 1000 x 900 x    Emesis Occurrence  1 x             Physical Exam:   Constitutional: She is oriented to person, place, and time. She appears well-developed and well-nourished.    HENT:   Head: Normocephalic and atraumatic.      Cardiovascular: Normal rate and regular rhythm.     Pulmonary/Chest: Effort normal. No respiratory distress.        Abdominal: Soft. She exhibits no distension. Abdominal incision: midline vertical incision with bandage in place; appears clean/dry/intact. There is no tenderness (appropriate post operative tenderness).                 Neurological: She is alert and oriented to person, place, and time. No cranial nerve deficit.    Skin: Skin is warm and dry.    Psychiatric: She has a normal mood and affect. Her behavior is normal. Judgment and thought content normal.       Lines/Drains/Airways     Peripheral Intravenous Line                 Peripheral IV - Single Lumen 11/06/19 1300 Anterior;Left Forearm less than 1 day              UOP: 1.89 cc/kg/hr over past 12 hours

## 2019-11-07 NOTE — ASSESSMENT & PLAN NOTE
64 y. Female s/p Small bowel resection with side to side anastomosis + Aortocaval Lymphadenectomy POD 3 Doing well.     - Diet advance as tolerated.   - PRN pain control  - PRN nausea and vomit.   - KUB  - Call if you have any questions.

## 2019-11-07 NOTE — ASSESSMENT & PLAN NOTE
- continue routine postop care.   - continue pain control with tordaol>ibuprofen/percocet/Dilaudid prn for breakthrough  - continue clear diet > advance as tolerated  - ambulate TID. IS bedside.  - UOP adequate  - H/h: 11/32/275  - DVT ppx: TEDs/SCDS and lovenox

## 2019-11-07 NOTE — PROGRESS NOTES
[facility]  General Surgery  Progress Note    Subjective:     History of Present Illness:  No notes on file    Post-Op Info:  Procedure(s) (LRB):  SALPINGO-OOPHORECTOMY, BILATERAL (N/A)  DEBULKING, NEOPLASM (N/A)  LYSIS, ADHESIONS (N/A)  HYSTERECTOMY, TOTAL, ABDOMINAL (N/A)  OMENTECTOMY (N/A)  EXCISION, SMALL INTESTINE (N/A)  LYMPHADENECTOMY, PARA-AORTIC (N/A)  LYMPHADENECTOMY, PELVIS (Bilateral)   3 Days Post-Op     Interval History:   NAEON.   Adequate pain control.   Advanced to regular diet. One episode of vomit   Passing gas    Scheduled Meds:   enoxaparin  40 mg Subcutaneous Daily    famotidine  20 mg Oral BID    ibuprofen  600 mg Oral Q6H    mupirocin  1 g Nasal BID    senna-docusate 8.6-50 mg  1 tablet Oral BID     Continuous Infusions:    PRN Meds:bisacodyl, diphenhydrAMINE, diphenhydrAMINE, HYDROmorphone, ondansetron, oxyCODONE, oxyCODONE, promethazine (PHENERGAN) IVPB    Review of patient's allergies indicates:  No Known Allergies    Objective:     Vital Signs (Most Recent):  Temp: 98.3 °F (36.8 °C) (11/07/19 0428)  Pulse: 79 (11/07/19 0428)  Resp: 20 (11/07/19 0428)  BP: (!) 136/91 (11/07/19 0428)  SpO2: 96 % (11/07/19 0428) Vital Signs (24h Range):  Temp:  [97.7 °F (36.5 °C)-98.3 °F (36.8 °C)] 98.3 °F (36.8 °C)  Pulse:  [65-79] 79  Resp:  [14-20] 20  SpO2:  [94 %-98 %] 96 %  BP: (122-149)/(80-97) 136/91     Weight: 61.7 kg (136 lb)  Body mass index is 23.34 kg/m².    Intake/Output - Last 3 Shifts       11/05 0700 - 11/06 0659 11/06 0700 - 11/07 0659    P.O. 720 1200    I.V. (mL/kg)  3336.3 (54.1)    Total Intake(mL/kg) 720 (11.7) 4536.3 (73.5)    Urine (mL/kg/hr)  2200 (1.5)    Emesis/NG output  0    Total Output  2200    Net +720 +2336.3          Urine Occurrence 1000 x 900 x    Emesis Occurrence  1 x             Physical Exam:   Constitutional: She is oriented to person, place, and time. She appears well-developed and well-nourished. No distress.       Cardiovascular: Normal  rate and regular rhythm.     Pulmonary/Chest: Effort normal. No respiratory distress.        Abdominal: Soft. She exhibits no distension. Abdominal incision: midline vertical incision with bandage overlying, appears clean/dry/intact. There is no tenderness (appropriate post operative tenderness).                 Neurological: She is alert and oriented to person, place, and time. No cranial nerve deficit.    Skin: Skin is warm and dry. She is not diaphoretic.    Psychiatric: She has a normal mood and affect. Her behavior is normal. Judgment and thought content normal.       Lines/Drains/Airways     Peripheral Intravenous Line                 Peripheral IV - Single Lumen 11/06/19 1300 Anterior;Left Forearm less than 1 day              Laboratory:  CBC:   No results for input(s): WBC, HGB, HCT, PLT in the last 48 hours.        Review of Systems      Assessment/Plan:     * S/P abdominal hysterectomy and left salpingo-oophorectomy, omentectomy, LND  64 y. Female s/p Small bowel resection with side to side anastomosis + Aortocaval Lymphadenectomy POD 3 Doing well.     - Diet advance as tolerated.   - PRN pain control  - PRN nausea and vomit.   - KUB  - Call if you have any questions.         Samir Waller MD  General Surgery  [facility]

## 2019-11-07 NOTE — SUBJECTIVE & OBJECTIVE
Interval History:   NAEON.   Adequate pain control.   Advanced to regular diet. One episode of vomit   Passing gas    Scheduled Meds:   enoxaparin  40 mg Subcutaneous Daily    famotidine  20 mg Oral BID    ibuprofen  600 mg Oral Q6H    mupirocin  1 g Nasal BID    senna-docusate 8.6-50 mg  1 tablet Oral BID     Continuous Infusions:    PRN Meds:bisacodyl, diphenhydrAMINE, diphenhydrAMINE, HYDROmorphone, ondansetron, oxyCODONE, oxyCODONE, promethazine (PHENERGAN) IVPB    Review of patient's allergies indicates:  No Known Allergies    Objective:     Vital Signs (Most Recent):  Temp: 98.3 °F (36.8 °C) (11/07/19 0428)  Pulse: 79 (11/07/19 0428)  Resp: 20 (11/07/19 0428)  BP: (!) 136/91 (11/07/19 0428)  SpO2: 96 % (11/07/19 0428) Vital Signs (24h Range):  Temp:  [97.7 °F (36.5 °C)-98.3 °F (36.8 °C)] 98.3 °F (36.8 °C)  Pulse:  [65-79] 79  Resp:  [14-20] 20  SpO2:  [94 %-98 %] 96 %  BP: (122-149)/(80-97) 136/91     Weight: 61.7 kg (136 lb)  Body mass index is 23.34 kg/m².    Intake/Output - Last 3 Shifts       11/05 0700 - 11/06 0659 11/06 0700 - 11/07 0659    P.O. 720 1200    I.V. (mL/kg)  3336.3 (54.1)    Total Intake(mL/kg) 720 (11.7) 4536.3 (73.5)    Urine (mL/kg/hr)  2200 (1.5)    Emesis/NG output  0    Total Output  2200    Net +720 +2336.3          Urine Occurrence 1000 x 900 x    Emesis Occurrence  1 x             Physical Exam:   Constitutional: She is oriented to person, place, and time. She appears well-developed and well-nourished. No distress.       Cardiovascular: Normal rate and regular rhythm.     Pulmonary/Chest: Effort normal. No respiratory distress.        Abdominal: Soft. She exhibits no distension. Abdominal incision: midline vertical incision with bandage overlying, appears clean/dry/intact. There is no tenderness (appropriate post operative tenderness).                 Neurological: She is alert and oriented to person, place, and time. No cranial nerve deficit.    Skin: Skin is warm and dry. She  is not diaphoretic.    Psychiatric: She has a normal mood and affect. Her behavior is normal. Judgment and thought content normal.       Lines/Drains/Airways     Peripheral Intravenous Line                 Peripheral IV - Single Lumen 11/06/19 1300 Anterior;Left Forearm less than 1 day              Laboratory:  CBC:   No results for input(s): WBC, HGB, HCT, PLT in the last 48 hours.      Physical Exam   Constitutional: She is oriented to person, place, and time. She appears well-developed and well-nourished. No distress.   Cardiovascular: Normal rate and regular rhythm.   Pulmonary/Chest: Effort normal. No respiratory distress.   Abdominal: Soft. She exhibits no distension. There is no tenderness (appropriate post operative tenderness).   Neurological: She is alert and oriented to person, place, and time. No cranial nerve deficit.   Skin: Skin is warm and dry. She is not diaphoretic.   Psychiatric: She has a normal mood and affect. Her behavior is normal. Judgment and thought content normal.   Vitals reviewed.      Review of Systems

## 2019-11-08 LAB
BLD PROD TYP BPU: NORMAL
BLOOD UNIT EXPIRATION DATE: NORMAL
BLOOD UNIT TYPE CODE: 5100
BLOOD UNIT TYPE: NORMAL
CODING SYSTEM: NORMAL
DISPENSE STATUS: NORMAL
TRANS ERYTHROCYTES VOL PATIENT: NORMAL ML

## 2019-11-08 PROCEDURE — 63600175 PHARM REV CODE 636 W HCPCS: Performed by: STUDENT IN AN ORGANIZED HEALTH CARE EDUCATION/TRAINING PROGRAM

## 2019-11-08 PROCEDURE — 94761 N-INVAS EAR/PLS OXIMETRY MLT: CPT

## 2019-11-08 PROCEDURE — 20600001 HC STEP DOWN PRIVATE ROOM

## 2019-11-08 PROCEDURE — 25000003 PHARM REV CODE 250: Performed by: STUDENT IN AN ORGANIZED HEALTH CARE EDUCATION/TRAINING PROGRAM

## 2019-11-08 PROCEDURE — 99024 PR POST-OP FOLLOW-UP VISIT: ICD-10-PCS | Mod: ,,, | Performed by: OBSTETRICS & GYNECOLOGY

## 2019-11-08 PROCEDURE — 99024 POSTOP FOLLOW-UP VISIT: CPT | Mod: ,,, | Performed by: OBSTETRICS & GYNECOLOGY

## 2019-11-08 RX ORDER — DEXTROSE MONOHYDRATE, SODIUM CHLORIDE, AND POTASSIUM CHLORIDE 50; 1.49; 9 G/1000ML; G/1000ML; G/1000ML
INJECTION, SOLUTION INTRAVENOUS CONTINUOUS
Status: DISCONTINUED | OUTPATIENT
Start: 2019-11-08 | End: 2019-11-10

## 2019-11-08 RX ORDER — IBUPROFEN 600 MG/1
600 TABLET ORAL EVERY 6 HOURS
Qty: 30 TABLET | Refills: 1 | Status: SHIPPED | OUTPATIENT
Start: 2019-11-08 | End: 2020-07-22

## 2019-11-08 RX ORDER — BISACODYL 10 MG
10 SUPPOSITORY, RECTAL RECTAL ONCE
Status: COMPLETED | OUTPATIENT
Start: 2019-11-08 | End: 2019-11-08

## 2019-11-08 RX ORDER — PROCHLORPERAZINE MALEATE 5 MG
5 TABLET ORAL 3 TIMES DAILY PRN
Status: DISCONTINUED | OUTPATIENT
Start: 2019-11-08 | End: 2019-11-10 | Stop reason: HOSPADM

## 2019-11-08 RX ADMIN — SENNOSIDES AND DOCUSATE SODIUM 1 TABLET: 8.6; 5 TABLET ORAL at 10:11

## 2019-11-08 RX ADMIN — SODIUM CHLORIDE 500 ML: 0.9 INJECTION, SOLUTION INTRAVENOUS at 07:11

## 2019-11-08 RX ADMIN — IBUPROFEN 600 MG: 600 TABLET, FILM COATED ORAL at 11:11

## 2019-11-08 RX ADMIN — PROCHLORPERAZINE MALEATE 5 MG: 5 TABLET ORAL at 04:11

## 2019-11-08 RX ADMIN — MUPIROCIN 1 G: 20 OINTMENT TOPICAL at 10:11

## 2019-11-08 RX ADMIN — ENOXAPARIN SODIUM 40 MG: 100 INJECTION SUBCUTANEOUS at 04:11

## 2019-11-08 RX ADMIN — MUPIROCIN 1 G: 20 OINTMENT TOPICAL at 09:11

## 2019-11-08 RX ADMIN — FAMOTIDINE 20 MG: 20 TABLET ORAL at 10:11

## 2019-11-08 RX ADMIN — BISACODYL 10 MG: 10 SUPPOSITORY RECTAL at 04:11

## 2019-11-08 RX ADMIN — POTASSIUM CHLORIDE, DEXTROSE MONOHYDRATE AND SODIUM CHLORIDE: 150; 5; 900 INJECTION, SOLUTION INTRAVENOUS at 10:11

## 2019-11-08 RX ADMIN — IBUPROFEN 600 MG: 600 TABLET, FILM COATED ORAL at 05:11

## 2019-11-08 RX ADMIN — ONDANSETRON 8 MG: 8 TABLET, ORALLY DISINTEGRATING ORAL at 09:11

## 2019-11-08 RX ADMIN — IBUPROFEN 600 MG: 600 TABLET, FILM COATED ORAL at 12:11

## 2019-11-08 NOTE — PLAN OF CARE
CM discussed the patient with the team. Plans for the patient to discharge over the weekend. Patient to discharge home on Lovenox. Lovenox to be delivered to the patient's bedside prior to discharge. Follow-up scheduled as listed below. No other discharge needs identified. Discharge and follow-up instructions to be completed by the bedside nurse.    Future Appointments   Date Time Provider Department Center   12/6/2019  1:30 PM Pérez Shukla MD [facility] GYN ONC [facility]        11/08/19 1303   Final Note   Assessment Type Final Discharge Note   Anticipated Discharge Disposition Home   What phone number can be called within the next 1-3 days to see how you are doing after discharge?   ([phone])   Hospital Follow Up  Appt(s) scheduled? Yes   Discharge plans and expectations educations in teach back method with documentation complete? Yes  (per the bedside nurse)

## 2019-11-08 NOTE — PLAN OF CARE
"POC reviewed with patient; verbalizes understanding. Reinforcement may be needed. AAOx4. Patient presents generalized weakness. Presented emesis in the day. Phenegran administered; patient states "she feels better". No s/sx of pain/distress noted. Safety precautions in place; call light within reach, bed in low position, wheels locked, side rails up x2. Will continue to monitor.  "

## 2019-11-08 NOTE — PROGRESS NOTES
Pt with new order for bisacodyl rectal supp. Verified with Dr French with surgical oncology okay to give.

## 2019-11-08 NOTE — PLAN OF CARE
Reviewed plan of care with pt at the beginning of the shift. Pt reported pain and n/v throughout the shift. Pt sitting up in chair most of the night. Vital signs were stable throughout the shift.Fall precautions continued for pt. Bed locked and at lowest position. Call light within reach. Pt knows to call if assistance is needed.

## 2019-11-08 NOTE — PHYSICIAN QUERY
PT Name: Liz Coto  MR #: [id number]    Physician Query Form - Relationship to Procedure Clarification     CDS/: ABDIAS Steinberg,[email or web address]         Contact information:[email or web address]     This form is a permanent document in the medical record.     Query Date: November 8, 2019      By submitting this query, we are merely seeking further clarification of documentation. Please utilize your independent clinical judgment when addressing the question(s) below.    The Medical record contains the following:  Supporting Clinical Findings Location in Medical Record   There are several loops of small bowel that were densely adherent to it.  In the   course of taking these down, serosal injuries occurred, subsequently,   necessitating a small bowel resection.     POSTOPERATIVE DIAGNOSES:  1.  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  2.  Dense small bowel adhesions to right adnexal mass.  3.  Aortocaval sherry mass.     PROCEDURES:  1.  Exploratory laparotomy.  2.  Total abdominal hysterectomy with bilateral salpingo-oophorectomy.  3.  Lysis of small bowel adhesions.  4.  Resection of aortocaval sherry mass with Dr. Clarence Colmenares.  5.  Small bowel resection with side-to-side and then functional end-to-end small   bowel resection with Dr. Clarence Colmenares.    Upon entering the operating room the small bowel was run from the terminal ileum to the ligament of Treitz. There were 3 regions in the small bowel of significant serosal injury from mobilizing away from a fixed pelvic mass. These areas were in close proximity and so a small bowel resection was performed removing about 10 inches of distal small bowel Op note 11/6                                                Op note 11/4       Please clarify if serosal injuries is      [ x ] Inherent/Integral to procedure   [  ] Present, but not a complication of the procedure   [  ] Incidental finding, not clinically significant    [  ] Complication of procedure   [  ] Other (please specify): __________________   [  ] Clinically Undetermined

## 2019-11-08 NOTE — SUBJECTIVE & OBJECTIVE
Interval History:   NAEON.Patient reports that she had nausea throughout the day yesterday, particularly when she received medications on an empty stomach. Since then however, patient reports improvement. No vomiting. Has been able to tolerate clear liquids without issue. No abdominal pain - has not required any PRN medications overnight and has refused ibuprofen on multiple occassions. Passing flatus, no BMs. Ambulating without difficulty. Minimal vaginal spotting.    Scheduled Meds:   enoxaparin  40 mg Subcutaneous Daily    famotidine  20 mg Oral BID    ibuprofen  600 mg Oral Q6H    mupirocin  1 g Nasal BID    senna-docusate 8.6-50 mg  1 tablet Oral BID     Continuous Infusions:  PRN Meds:bisacodyl, diphenhydrAMINE, diphenhydrAMINE, HYDROmorphone, ondansetron, oxyCODONE, oxyCODONE, promethazine (PHENERGAN) IVPB    Review of patient's allergies indicates:  No Known Allergies    Objective:     Vital Signs (Most Recent):  Temp: 98.4 °F (36.9 °C) (11/08/19 0425)  Pulse: 87 (11/08/19 0425)  Resp: 18 (11/08/19 0425)  BP: (!) 143/96 (11/08/19 0425)  SpO2: 97 % (11/08/19 0425) Vital Signs (24h Range):  Temp:  [98.4 °F (36.9 °C)-99.7 °F (37.6 °C)] 98.4 °F (36.9 °C)  Pulse:  [73-95] 87  Resp:  [16-20] 18  SpO2:  [95 %-97 %] 97 %  BP: (127-153)/(82-96) 143/96     Weight: 61.7 kg (136 lb)  Body mass index is 23.34 kg/m².    Intake/Output - Last 3 Shifts       11/06 0700 - 11/07 0659 11/07 0700 - 11/08 0659    P.O. 1200 180    I.V. (mL/kg) 3336.3 (54.1)     Total Intake(mL/kg) 4536.3 (73.5) 180 (2.9)    Urine (mL/kg/hr) 2200 (1.5) 200 (0.1)    Emesis/NG output 0 20    Total Output 2200 220    Net +2336.3 -40          Urine Occurrence 900 x 1 x    Emesis Occurrence 1 x              Physical Exam:   Constitutional: She is oriented to person, place, and time. She appears well-developed and well-nourished.   Patient sitting up in chair       Cardiovascular: Normal rate and regular rhythm.     Pulmonary/Chest: Effort normal.  No respiratory distress.        Abdominal: Soft. She exhibits no distension. Abdominal incision: Bandage removed. Infraumbilicali midline vertical incision with mild shadowing over umbilicus however no signs of active bleeding. Otherwise, steri strips in place. Rest of wound looks clean/dry/intact. There is no tenderness (Minimal abdominal tenderness, appropriate for post operative period).                 Neurological: She is alert and oriented to person, place, and time. No cranial nerve deficit.    Skin: Skin is warm and dry.    Psychiatric: She has a normal mood and affect. Her behavior is normal. Judgment and thought content normal.       Lines/Drains/Airways     Peripheral Intravenous Line                 Peripheral IV - Single Lumen 11/06/19 1300 Anterior;Left Forearm 1 day              UOP: 0.27 cc/kg/hr over past 12 hours; however patient admits that she has not been drinking as much fluids overnight

## 2019-11-08 NOTE — PROGRESS NOTES
[facility]  Gynecologic Oncology  Progress Note      Patient Name: Liz Coto  MRN: [id number]  Admission Date: 2019  Hospital Length of Stay: 4 days  Attending Provider: Pérez Shukla MD  Primary Care Provider: Griselda Alegre MD  Principal Problem: S/P abdominal hysterectomy and left salpingo-oophorectomy    Follow-up For: Procedure(s) (LRB):  SALPINGO-OOPHORECTOMY, BILATERAL (N/A)  DEBULKING, NEOPLASM (N/A)  LYSIS, ADHESIONS (N/A)  HYSTERECTOMY, TOTAL, ABDOMINAL (N/A)  OMENTECTOMY (N/A)  EXCISION, SMALL INTESTINE (N/A)  LYMPHADENECTOMY, PARA-AORTIC (N/A)  LYMPHADENECTOMY, PELVIS (Bilateral)  Post-Operative Day: 4 Days Post-Op  Subjective:      History of Present Illness:  Liz Coto is a 64 y.o. [id number] who is admitted for post operative care, status post HOLLY/BSO/lysis of adhesions/omentectomy /lymphadenectomoy and debulking of neoplasm. Surgical oncology was also consulted intra operatively to help assist in excision of portion of the small bowel.       Hospital Course:  2019 - NAEON. Patient reports she was able to tolerate clear diet without nausea or vomiting. Pain well controlled on scheduled pain medications. 1x flatus, no BMs. Bah in place, draining clear urine. Has not ambulated yet.   2019 - Stable. Reports some pain with increased movement yesterday. Pain well controlled on medications. No flatus, no BMs. Urinating without issue. Ambulating in the halls and in her room without difficulty  2019- Stable. Patient reports 1x episode of vomit overnight however reports improvement after this event. Pain well controlled on medications. Passing flatus, no BMs. Urinating and ambulating in the halls without issue  2019 - Patient reports that she had nausea throughout the day, particularly when she received medications on an empty stomach. Since then however, patient reports improvement. No vomiting. Has been able to tolerate clear  liquids without issue. No abdominal pain - has not required any PRN medications overnight. Passing flatus, no BMs. Ambulating without difficulty. Minimal vaginal spotting.      Interval History:   NAEON.Patient reports that she had nausea throughout the day yesterday, particularly when she received medications on an empty stomach. Since then however, patient reports improvement. No vomiting. Has been able to tolerate clear liquids without issue. No abdominal pain - has not required any PRN medications overnight and has refused ibuprofen on multiple occassions. Passing flatus, no BMs. Ambulating without difficulty. Minimal vaginal spotting.    Scheduled Meds:   enoxaparin  40 mg Subcutaneous Daily    famotidine  20 mg Oral BID    ibuprofen  600 mg Oral Q6H    mupirocin  1 g Nasal BID    senna-docusate 8.6-50 mg  1 tablet Oral BID     Continuous Infusions:  PRN Meds:bisacodyl, diphenhydrAMINE, diphenhydrAMINE, HYDROmorphone, ondansetron, oxyCODONE, oxyCODONE, promethazine (PHENERGAN) IVPB    Review of patient's allergies indicates:  No Known Allergies    Objective:     Vital Signs (Most Recent):  Temp: 98.4 °F (36.9 °C) (11/08/19 0425)  Pulse: 87 (11/08/19 0425)  Resp: 18 (11/08/19 0425)  BP: (!) 143/96 (11/08/19 0425)  SpO2: 97 % (11/08/19 0425) Vital Signs (24h Range):  Temp:  [98.4 °F (36.9 °C)-99.7 °F (37.6 °C)] 98.4 °F (36.9 °C)  Pulse:  [73-95] 87  Resp:  [16-20] 18  SpO2:  [95 %-97 %] 97 %  BP: (127-153)/(82-96) 143/96     Weight: 61.7 kg (136 lb)  Body mass index is 23.34 kg/m².    Intake/Output - Last 3 Shifts       11/06 0700 - 11/07 0659 11/07 0700 - 11/08 0659    P.O. 1200 180    I.V. (mL/kg) 3336.3 (54.1)     Total Intake(mL/kg) 4536.3 (73.5) 180 (2.9)    Urine (mL/kg/hr) 2200 (1.5) 200 (0.1)    Emesis/NG output 0 20    Total Output 2200 220    Net +2336.3 -40          Urine Occurrence 900 x 1 x    Emesis Occurrence 1 x              Physical Exam:   Constitutional: She is oriented to person, place,  and time. She appears well-developed and well-nourished.   Patient sitting up in chair       Cardiovascular: Normal rate and regular rhythm.     Pulmonary/Chest: Effort normal. No respiratory distress.        Abdominal: Soft. She exhibits no distension. Abdominal incision: Bandage removed. Infraumbilicali midline vertical incision with mild shadowing over umbilicus however no signs of active bleeding. Otherwise, steri strips in place. Rest of wound looks clean/dry/intact. There is no tenderness (Minimal abdominal tenderness, appropriate for post operative period).                 Neurological: She is alert and oriented to person, place, and time. No cranial nerve deficit.    Skin: Skin is warm and dry.    Psychiatric: She has a normal mood and affect. Her behavior is normal. Judgment and thought content normal.       Lines/Drains/Airways     Peripheral Intravenous Line                 Peripheral IV - Single Lumen 11/06/19 1300 Anterior;Left Forearm 1 day              UOP: 0.27 cc/kg/hr over past 12 hours; however patient admits that she has not been drinking as much fluids overnight    Assessment/Plan:     * S/P abdominal hysterectomy and left salpingo-oophorectomy, omentectomy, LND  - continue routine postop care.   - continue pain control with tordaol>ibuprofen/percocet/Dilaudid prn for breakthrough  - continue clear diet > advance as tolerated  - ambulate TID. IS bedside.  - UOP low however, most likely due to patient not drinking as much fluids overnight   - Encouraged fluid intake, consider 500 cc bolus if necessary   - H/h: 11/32/275  - DVT ppx: TEDs/SCDS and lovenox          VTE Risk Mitigation (From admission, onward)         Ordered     enoxaparin injection 40 mg  Daily      11/04/19 9954                Was harry catheter removed? Yes    Camille Perez MD  Gynecologic Oncology  [facility]

## 2019-11-08 NOTE — ASSESSMENT & PLAN NOTE
- continue routine postop care.   - continue pain control with tordaol>ibuprofen/percocet/Dilaudid prn for breakthrough  - continue clear diet > advance as tolerated  - ambulate TID. IS bedside.  - UOP low however, most likely due to patient not drinking as much fluids overnight   - Encouraged fluid intake, consider 500 cc bolus if necessary   - H/h: 11/32/275  - DVT ppx: TEDs/SCDS and lovenox

## 2019-11-09 PROCEDURE — 20600001 HC STEP DOWN PRIVATE ROOM

## 2019-11-09 PROCEDURE — 25000003 PHARM REV CODE 250: Performed by: STUDENT IN AN ORGANIZED HEALTH CARE EDUCATION/TRAINING PROGRAM

## 2019-11-09 PROCEDURE — 99233 SBSQ HOSP IP/OBS HIGH 50: CPT | Mod: ,,, | Performed by: OBSTETRICS & GYNECOLOGY

## 2019-11-09 PROCEDURE — 99233 PR SUBSEQUENT HOSPITAL CARE,LEVL III: ICD-10-PCS | Mod: ,,, | Performed by: OBSTETRICS & GYNECOLOGY

## 2019-11-09 PROCEDURE — 63600175 PHARM REV CODE 636 W HCPCS: Performed by: STUDENT IN AN ORGANIZED HEALTH CARE EDUCATION/TRAINING PROGRAM

## 2019-11-09 RX ADMIN — SENNOSIDES AND DOCUSATE SODIUM 1 TABLET: 8.6; 5 TABLET ORAL at 09:11

## 2019-11-09 RX ADMIN — FAMOTIDINE 20 MG: 20 TABLET ORAL at 09:11

## 2019-11-09 RX ADMIN — IBUPROFEN 600 MG: 600 TABLET, FILM COATED ORAL at 05:11

## 2019-11-09 RX ADMIN — SENNOSIDES AND DOCUSATE SODIUM 1 TABLET: 8.6; 5 TABLET ORAL at 08:11

## 2019-11-09 RX ADMIN — ENOXAPARIN SODIUM 40 MG: 100 INJECTION SUBCUTANEOUS at 05:11

## 2019-11-09 RX ADMIN — MUPIROCIN 1 G: 20 OINTMENT TOPICAL at 10:11

## 2019-11-09 RX ADMIN — FAMOTIDINE 20 MG: 20 TABLET ORAL at 08:11

## 2019-11-09 RX ADMIN — POTASSIUM CHLORIDE, DEXTROSE MONOHYDRATE AND SODIUM CHLORIDE: 150; 5; 900 INJECTION, SOLUTION INTRAVENOUS at 07:11

## 2019-11-09 NOTE — PROGRESS NOTES
[facility]  Gynecologic Oncology  Progress Note      Patient Name: Liz Coto  MRN: [id number]  Admission Date: 2019  Hospital Length of Stay: 5 days  Attending Provider: Pérez Shukla MD  Primary Care Provider: Griselda Alegre MD  Principal Problem: S/P abdominal hysterectomy and left salpingo-oophorectomy    Follow-up For: Procedure(s) (LRB):  SALPINGO-OOPHORECTOMY, BILATERAL (N/A)  DEBULKING, NEOPLASM (N/A)  LYSIS, ADHESIONS (N/A)  HYSTERECTOMY, TOTAL, ABDOMINAL (N/A)  OMENTECTOMY (N/A)  EXCISION, SMALL INTESTINE (N/A)  LYMPHADENECTOMY, PARA-AORTIC (N/A)  LYMPHADENECTOMY, PELVIS (Bilateral)  Post-Operative Day: 5 Days Post-Op  Subjective:      History of Present Illness:  Liz Coto is a 64 y.o. [id number] who is admitted for post operative care, status post HOLLY/BSO/lysis of adhesions/omentectomy /lymphadenectomoy and debulking of neoplasm. Surgical oncology was also consulted intra operatively to help assist in excision of portion of the small bowel.       Hospital Course:  2019 - NAEON. Patient reports she was able to tolerate clear diet without nausea or vomiting. Pain well controlled on scheduled pain medications. 1x flatus, no BMs. Bah in place, draining clear urine. Has not ambulated yet.   2019 - Stable. Reports some pain with increased movement yesterday. Pain well controlled on medications. No flatus, no BMs. Urinating without issue. Ambulating in the halls and in her room without difficulty  2019- Stable. Patient reports 1x episode of vomit overnight however reports improvement after this event. Pain well controlled on medications. Passing flatus, no BMs. Urinating and ambulating in the halls without issue  2019 - Patient reports that she had nausea throughout the day, particularly when she received medications on an empty stomach. Since then however, patient reports improvement. No vomiting. Has been able to tolerate clear  liquids without issue. No abdominal pain - has not required any PRN medications overnight. Passing flatus, no BMs. Ambulating without difficulty. Minimal vaginal spotting.  11/09/2019- Improvement in nausea. She states that overnight she did not experience any nausea or vomiting. She has been able to tolerate clear liquids without issue. No abdominal pain. Did get suppository yesterday and had 1x large BM and has been intermittently passing gas since. She believes this helped extremely with her symptoms. Urinating appropriately. Ambulating without issue        Interval History:   See above    Scheduled Meds:   enoxaparin  40 mg Subcutaneous Daily    famotidine  20 mg Oral BID    ibuprofen  600 mg Oral Q6H    mupirocin  1 g Nasal BID    senna-docusate 8.6-50 mg  1 tablet Oral BID     Continuous Infusions:   dextrose 5 % and 0.9 % NaCl with KCl 20 mEq 75 mL/hr at 11/08/19 2210     PRN Meds:bisacodyl, diphenhydrAMINE, diphenhydrAMINE, HYDROmorphone, oxyCODONE, oxyCODONE, prochlorperazine, promethazine (PHENERGAN) IVPB    Review of patient's allergies indicates:  No Known Allergies    Objective:     Vital Signs (Most Recent):  Temp: 98.4 °F (36.9 °C) (11/09/19 0421)  Pulse: 78 (11/09/19 0421)  Resp: 18 (11/09/19 0421)  BP: 129/89 (11/09/19 0421)  SpO2: (!) 93 % (11/09/19 0421) Vital Signs (24h Range):  Temp:  [98.1 °F (36.7 °C)-99.8 °F (37.7 °C)] 98.4 °F (36.9 °C)  Pulse:  [78-95] 78  Resp:  [15-18] 18  SpO2:  [92 %-96 %] 93 %  BP: (122-142)/(80-98) 129/89     Weight: 61.7 kg (136 lb)  Body mass index is 23.34 kg/m².    Intake/Output - Last 3 Shifts       11/07 0700 - 11/08 0659 11/08 0700 - 11/09 0659 11/09 0700 - 11/10 0659    P.O. 180 740     I.V. (mL/kg)  965 (15.6)     Total Intake(mL/kg) 180 (2.9) 1705 (27.6)     Urine (mL/kg/hr) 200 (0.1) 2000 (1.4)     Emesis/NG output 20 2     Total Output 220 2002     Net -40 -297            Urine Occurrence 1 x               Physical Exam:   Constitutional: She is  oriented to person, place, and time. She appears well-developed and well-nourished.       Cardiovascular: Normal rate and regular rhythm.     Pulmonary/Chest: Effort normal. No respiratory distress.        Abdominal: Soft. She exhibits no distension. There is no tenderness.   Midline vertical incision appears clean/dry/intact. No abdominal tenderness to palpation                 Neurological: She is alert and oriented to person, place, and time.    Skin: Skin is warm and dry.    Psychiatric: She has a normal mood and affect. Her behavior is normal. Judgment and thought content normal.       Lines/Drains/Airways     Peripheral Intravenous Line                 Peripheral IV - Single Lumen 11/06/19 1300 Anterior;Left Forearm 2 days              UOP: 0.979 cc/kg/hr over past 12 hours    Assessment/Plan:     * S/P abdominal hysterectomy and left salpingo-oophorectomy, omentectomy, LND  - continue routine postop care.   - continue pain control with tordaol>ibuprofen/percocet/Dilaudid prn for breakthrough  - continue clear diet > advance as tolerated. Will attempt solid foods today   - s/p suppository yesterday with 1x large BM and  Has been passing flatus intermittently since  - Phenergan/zofran for nausea   - ambulate TID. IS bedside.  - UOP appropriate overnight   - H/h: 11/32/275  - DVT ppx: TEDs/SCDS and lovenox          VTE Risk Mitigation (From admission, onward)         Ordered     enoxaparin injection 40 mg  Daily      11/04/19 1456                Was harry catheter removed? Yes    Camille Perez MD  Gynecologic Oncology  [facility]

## 2019-11-09 NOTE — PLAN OF CARE
Side rails up x2; call bell in place; bed in lowest, locked position; skid proof socks on; no evidence of skin breakdown; care plan explained to patient; pt remains free of injury. Pt not tolerating po, emesis x 2, zofran and compazine given pt with moderate relief. Bisacodyl supp given pt with very small soft stool.NS 500cc bolus given. Pt denies pain, ambulates in to bathroom frequently. VSS and afebrile.

## 2019-11-09 NOTE — SUBJECTIVE & OBJECTIVE
Interval History:   Multiple BMs after suppository. Feeling much better. Tolerating clears with no nausea or vomiting.     Medications:  Continuous Infusions:   dextrose 5 % and 0.9 % NaCl with KCl 20 mEq 75 mL/hr at 11/08/19 2210     Scheduled Meds:   enoxaparin  40 mg Subcutaneous Daily    famotidine  20 mg Oral BID    ibuprofen  600 mg Oral Q6H    mupirocin  1 g Nasal BID    senna-docusate 8.6-50 mg  1 tablet Oral BID     PRN Meds:bisacodyl, diphenhydrAMINE, diphenhydrAMINE, HYDROmorphone, oxyCODONE, oxyCODONE, prochlorperazine, promethazine (PHENERGAN) IVPB     Review of patient's allergies indicates:  No Known Allergies  Objective:     Vital Signs (Most Recent):  Temp: 98.7 °F (37.1 °C) (11/09/19 0832)  Pulse: 72 (11/09/19 0832)  Resp: 18 (11/09/19 0832)  BP: (!) 127/91 (11/09/19 0832)  SpO2: 96 % (11/09/19 0832) Vital Signs (24h Range):  Temp:  [98.1 °F (36.7 °C)-98.7 °F (37.1 °C)] 98.7 °F (37.1 °C)  Pulse:  [72-95] 72  Resp:  [15-18] 18  SpO2:  [92 %-96 %] 96 %  BP: (122-142)/(80-98) 127/91     Weight: 61.7 kg (136 lb)  Body mass index is 23.34 kg/m².    Intake/Output - Last 3 Shifts       11/07 0700 - 11/08 0659 11/08 0700 - 11/09 0659 11/09 0700 - 11/10 0659    P.O. 180 740     I.V. (mL/kg)  965 (15.6)     Total Intake(mL/kg) 180 (2.9) 1705 (27.6)     Urine (mL/kg/hr) 200 (0.1) 2000 (1.4)     Emesis/NG output 20 2     Total Output 220 2002     Net -40 -297            Urine Occurrence 1 x            Physical Exam   Constitutional: She is oriented to person, place, and time. She appears well-developed and well-nourished. No distress.   Cardiovascular: Normal rate and regular rhythm.   Pulmonary/Chest: Effort normal. No respiratory distress.   Abdominal: Soft. She exhibits no distension. There is no tenderness. There is no rebound and no guarding.   Neurological: She is alert and oriented to person, place, and time.   Skin: Skin is warm.   Psychiatric: She has a normal mood and affect. Her behavior is  normal.

## 2019-11-09 NOTE — PLAN OF CARE
Patient remained AAOx3 throughout the shift. Assessment completed & no alarming findings found.VS remained stable. All schedules/PRN medications administered as ordered. Tolerating a regular diet without any difficulty. Activity done independently. Voids per toilet;adequate output noted. Three bowel movements noted today.  side rails up x2; call bell in place; bed in lowest, locked position; skid proof socks on; no evidence of skin breakdown; care plan explained to patient; no additional complaints at this time. Will continue routine plan of care.

## 2019-11-09 NOTE — PROGRESS NOTES
[facility]  General Surgery  Progress Note    Subjective:     History of Present Illness:  No notes on file    Post-Op Info:  Procedure(s) (LRB):  SALPINGO-OOPHORECTOMY, BILATERAL (N/A)  DEBULKING, NEOPLASM (N/A)  LYSIS, ADHESIONS (N/A)  HYSTERECTOMY, TOTAL, ABDOMINAL (N/A)  OMENTECTOMY (N/A)  EXCISION, SMALL INTESTINE (N/A)  LYMPHADENECTOMY, PARA-AORTIC (N/A)  LYMPHADENECTOMY, PELVIS (Bilateral)   5 Days Post-Op     Interval History:   Multiple BMs after suppository. Feeling much better. Tolerating clears with no nausea or vomiting.     Medications:  Continuous Infusions:   dextrose 5 % and 0.9 % NaCl with KCl 20 mEq 75 mL/hr at 11/08/19 2210     Scheduled Meds:   enoxaparin  40 mg Subcutaneous Daily    famotidine  20 mg Oral BID    ibuprofen  600 mg Oral Q6H    mupirocin  1 g Nasal BID    senna-docusate 8.6-50 mg  1 tablet Oral BID     PRN Meds:bisacodyl, diphenhydrAMINE, diphenhydrAMINE, HYDROmorphone, oxyCODONE, oxyCODONE, prochlorperazine, promethazine (PHENERGAN) IVPB     Review of patient's allergies indicates:  No Known Allergies  Objective:     Vital Signs (Most Recent):  Temp: 98.7 °F (37.1 °C) (11/09/19 0832)  Pulse: 72 (11/09/19 0832)  Resp: 18 (11/09/19 0832)  BP: (!) 127/91 (11/09/19 0832)  SpO2: 96 % (11/09/19 0832) Vital Signs (24h Range):  Temp:  [98.1 °F (36.7 °C)-98.7 °F (37.1 °C)] 98.7 °F (37.1 °C)  Pulse:  [72-95] 72  Resp:  [15-18] 18  SpO2:  [92 %-96 %] 96 %  BP: (122-142)/(80-98) 127/91     Weight: 61.7 kg (136 lb)  Body mass index is 23.34 kg/m².    Intake/Output - Last 3 Shifts       11/07 0700 - 11/08 0659 11/08 0700 - 11/09 0659 11/09 0700 - 11/10 0659    P.O. 180 740     I.V. (mL/kg)  965 (15.6)     Total Intake(mL/kg) 180 (2.9) 1705 (27.6)     Urine (mL/kg/hr) 200 (0.1) 2000 (1.4)     Emesis/NG output 20 2     Total Output 220 2002     Net -40 -297            Urine Occurrence 1 x            Physical Exam   Constitutional: She is oriented to person, place, and  time. She appears well-developed and well-nourished. No distress.   Cardiovascular: Normal rate and regular rhythm.   Pulmonary/Chest: Effort normal. No respiratory distress.   Abdominal: Soft. She exhibits no distension. There is no tenderness. There is no rebound and no guarding.   Neurological: She is alert and oriented to person, place, and time.   Skin: Skin is warm.   Psychiatric: She has a normal mood and affect. Her behavior is normal.         Assessment/Plan:     * S/P abdominal hysterectomy and left salpingo-oophorectomy, omentectomy, LND  64 y. Female s/p Small bowel resection with side to side anastomosis + Aortocaval Lymphadenectomy    - Diet advance as tolerated to regular diet   - PRN pain control  - Remainder of care per primary team        Christiana Coe MD  General Surgery  [facility]

## 2019-11-09 NOTE — PLAN OF CARE
Afebrile. Free from falls or injury. No complaints of pain. [id number] infusing at 75. Ibuprofen given as scheduled. Bed locked in lowest position, non skid socks on, call light within reach. Pt instructed to call if any assistance is needed. Vitals stable. Will cont to christel pt.

## 2019-11-09 NOTE — ASSESSMENT & PLAN NOTE
- continue routine postop care.   - continue pain control with tordaol>ibuprofen/percocet/Dilaudid prn for breakthrough  - continue clear diet > advance as tolerated. Will attempt solid foods today   - s/p suppository yesterday with 1x large BM and  Has been passing flatus intermittently since  - Phenergan/zofran for nausea   - ambulate TID. IS bedside.  - UOP appropriate overnight   - H/h: 11/32/275  - DVT ppx: TEDs/SCDS and lovenox

## 2019-11-09 NOTE — ASSESSMENT & PLAN NOTE
64 y. Female s/p Small bowel resection with side to side anastomosis + Aortocaval Lymphadenectomy    - Diet advance as tolerated to regular diet   - PRN pain control  - Remainder of care per primary team

## 2019-11-09 NOTE — SUBJECTIVE & OBJECTIVE
Interval History:   See above    Scheduled Meds:   enoxaparin  40 mg Subcutaneous Daily    famotidine  20 mg Oral BID    ibuprofen  600 mg Oral Q6H    mupirocin  1 g Nasal BID    senna-docusate 8.6-50 mg  1 tablet Oral BID     Continuous Infusions:   dextrose 5 % and 0.9 % NaCl with KCl 20 mEq 75 mL/hr at 11/08/19 2210     PRN Meds:bisacodyl, diphenhydrAMINE, diphenhydrAMINE, HYDROmorphone, oxyCODONE, oxyCODONE, prochlorperazine, promethazine (PHENERGAN) IVPB    Review of patient's allergies indicates:  No Known Allergies    Objective:     Vital Signs (Most Recent):  Temp: 98.4 °F (36.9 °C) (11/09/19 0421)  Pulse: 78 (11/09/19 0421)  Resp: 18 (11/09/19 0421)  BP: 129/89 (11/09/19 0421)  SpO2: (!) 93 % (11/09/19 0421) Vital Signs (24h Range):  Temp:  [98.1 °F (36.7 °C)-99.8 °F (37.7 °C)] 98.4 °F (36.9 °C)  Pulse:  [78-95] 78  Resp:  [15-18] 18  SpO2:  [92 %-96 %] 93 %  BP: (122-142)/(80-98) 129/89     Weight: 61.7 kg (136 lb)  Body mass index is 23.34 kg/m².    Intake/Output - Last 3 Shifts       11/07 0700 - 11/08 0659 11/08 0700 - 11/09 0659 11/09 0700 - 11/10 0659    P.O. 180 740     I.V. (mL/kg)  965 (15.6)     Total Intake(mL/kg) 180 (2.9) 1705 (27.6)     Urine (mL/kg/hr) 200 (0.1) 2000 (1.4)     Emesis/NG output 20 2     Total Output 220 2002     Net -40 -297            Urine Occurrence 1 x               Physical Exam:   Constitutional: She is oriented to person, place, and time. She appears well-developed and well-nourished.       Cardiovascular: Normal rate and regular rhythm.     Pulmonary/Chest: Effort normal. No respiratory distress.        Abdominal: Soft. She exhibits no distension. There is no tenderness.   Midline vertical incision appears clean/dry/intact. No abdominal tenderness to palpation                 Neurological: She is alert and oriented to person, place, and time.    Skin: Skin is warm and dry.    Psychiatric: She has a normal mood and affect. Her behavior is normal. Judgment and  thought content normal.       Lines/Drains/Airways     Peripheral Intravenous Line                 Peripheral IV - Single Lumen 11/06/19 1300 Anterior;Left Forearm 2 days              UOP: 0.979 cc/kg/hr over past 12 hours

## 2019-11-10 VITALS
OXYGEN SATURATION: 98 % | DIASTOLIC BLOOD PRESSURE: 83 MMHG | TEMPERATURE: 98 F | HEIGHT: 64 IN | SYSTOLIC BLOOD PRESSURE: 118 MMHG | WEIGHT: 136 LBS | HEART RATE: 72 BPM | RESPIRATION RATE: 20 BRPM | BODY MASS INDEX: 23.22 KG/M2

## 2019-11-10 PROCEDURE — 99233 PR SUBSEQUENT HOSPITAL CARE,LEVL III: ICD-10-PCS | Mod: ,,, | Performed by: OBSTETRICS & GYNECOLOGY

## 2019-11-10 PROCEDURE — 63600175 PHARM REV CODE 636 W HCPCS: Performed by: STUDENT IN AN ORGANIZED HEALTH CARE EDUCATION/TRAINING PROGRAM

## 2019-11-10 PROCEDURE — 99233 SBSQ HOSP IP/OBS HIGH 50: CPT | Mod: ,,, | Performed by: OBSTETRICS & GYNECOLOGY

## 2019-11-10 PROCEDURE — 25000003 PHARM REV CODE 250: Performed by: STUDENT IN AN ORGANIZED HEALTH CARE EDUCATION/TRAINING PROGRAM

## 2019-11-10 RX ADMIN — SENNOSIDES AND DOCUSATE SODIUM 1 TABLET: 8.6; 5 TABLET ORAL at 09:11

## 2019-11-10 RX ADMIN — ENOXAPARIN SODIUM 40 MG: 100 INJECTION SUBCUTANEOUS at 04:11

## 2019-11-10 RX ADMIN — IBUPROFEN 600 MG: 600 TABLET, FILM COATED ORAL at 02:11

## 2019-11-10 RX ADMIN — FAMOTIDINE 20 MG: 20 TABLET ORAL at 09:11

## 2019-11-10 RX ADMIN — IBUPROFEN 600 MG: 600 TABLET, FILM COATED ORAL at 12:11

## 2019-11-10 NOTE — NURSING
Pt discharged to home at this time per MD order. Copy of current med list given to pt.  All discharge instructions, medications, prescriptions, and follow-up appointments reviewed with pt; copy given and all questions answered to pt's satisfaction. Pt. verbalized understanding with no further questions.   Pt ambulating in room with steady gait; tolerating a regular diet; voiding without difficulty; pain well-controlled with PO pain meds.  All VSS; O2 sats WNL on RA.  Pt left floor via wheelchair; accompanied by pt escort and family; no distress noted.

## 2019-11-10 NOTE — PROGRESS NOTES
[facility]  General Surgery  Progress Note    Subjective:     History of Present Illness:  No notes on file    Post-Op Info:  Procedure(s) (LRB):  SALPINGO-OOPHORECTOMY, BILATERAL (N/A)  DEBULKING, NEOPLASM (N/A)  LYSIS, ADHESIONS (N/A)  HYSTERECTOMY, TOTAL, ABDOMINAL (N/A)  OMENTECTOMY (N/A)  EXCISION, SMALL INTESTINE (N/A)  LYMPHADENECTOMY, PARA-AORTIC (N/A)  LYMPHADENECTOMY, PELVIS (Bilateral)   6 Days Post-Op     Interval History:   Multiple BMs after suppository. Feeling much better. Tolerating clears with no nausea or vomiting.     Medications:  Continuous Infusions:    Scheduled Meds:   enoxaparin  40 mg Subcutaneous Daily    famotidine  20 mg Oral BID    ibuprofen  600 mg Oral Q6H    senna-docusate 8.6-50 mg  1 tablet Oral BID     PRN Meds:bisacodyl, diphenhydrAMINE, diphenhydrAMINE, HYDROmorphone, oxyCODONE, oxyCODONE, prochlorperazine, promethazine (PHENERGAN) IVPB     Review of patient's allergies indicates:  No Known Allergies  Objective:     Vital Signs (Most Recent):  Temp: 98 °F (36.7 °C) (11/10/19 0710)  Pulse: 70 (11/10/19 0710)  Resp: 18 (11/10/19 0710)  BP: 126/88 (11/10/19 0710)  SpO2: 95 % (11/10/19 0710) Vital Signs (24h Range):  Temp:  [98 °F (36.7 °C)-98.8 °F (37.1 °C)] 98 °F (36.7 °C)  Pulse:  [70-77] 70  Resp:  [16-18] 18  SpO2:  [95 %-98 %] 95 %  BP: (126-147)/(85-96) 126/88     Weight: 61.7 kg (136 lb)  Body mass index is 23.34 kg/m².    Intake/Output - Last 3 Shifts       11/08 0700 - 11/09 0659 11/09 0700 - 11/10 0659 11/10 0700 - 11/11 0659    P.O. 740      I.V. (mL/kg) 965 (15.6) 932.6 (15.1)     Total Intake(mL/kg) 1705 (27.6) 932.6 (15.1)     Urine (mL/kg/hr) 2000 (1.4) 800 (0.5)     Emesis/NG output 2      Stool  0     Total Output 2002 800     Net -297 +132.6            Stool Occurrence  3 x           Physical Exam   Constitutional: She is oriented to person, place, and time. She appears well-developed and well-nourished. No distress.   Cardiovascular:  Normal rate and regular rhythm.   Pulmonary/Chest: Effort normal. No respiratory distress.   Abdominal: Soft. She exhibits no distension. There is no tenderness. There is no rebound and no guarding.   Neurological: She is alert and oriented to person, place, and time.   Skin: Skin is warm.   Psychiatric: She has a normal mood and affect. Her behavior is normal.         Assessment/Plan:     * S/P abdominal hysterectomy and left salpingo-oophorectomy, omentectomy, LND  64 y. Female s/p Small bowel resection with side to side anastomosis + Aortocaval Lymphadenectomy    - Diet advance as tolerated to regular diet   - PRN pain control  - Remainder of care per primary team  - Gen surg will sign off        Noé Dey MD  General Surgery  [facility]

## 2019-11-10 NOTE — PROGRESS NOTES
[facility]  Gynecologic Oncology  Progress Note      Patient Name: Liz Coto  MRN: [id number]  Admission Date: 2019  Hospital Length of Stay: 6 days  Attending Provider: Pérez Shukla MD  Primary Care Provider: Griselda Alegre MD  Principal Problem: S/P abdominal hysterectomy and left salpingo-oophorectomy    Follow-up For: Procedure(s) (LRB):  SALPINGO-OOPHORECTOMY, BILATERAL (N/A)  DEBULKING, NEOPLASM (N/A)  LYSIS, ADHESIONS (N/A)  HYSTERECTOMY, TOTAL, ABDOMINAL (N/A)  OMENTECTOMY (N/A)  EXCISION, SMALL INTESTINE (N/A)  LYMPHADENECTOMY, PARA-AORTIC (N/A)  LYMPHADENECTOMY, PELVIS (Bilateral)  Post-Operative Day: 6 Days Post-Op  Subjective:      History of Present Illness:  Liz Coto is a 64 y.o. [id number] who is admitted for post operative care, status post HOLLY/BSO/lysis of adhesions/omentectomy /lymphadenectomoy and debulking of neoplasm. Surgical oncology was also consulted intra operatively to help assist in excision of portion of the small bowel.       Hospital Course:  2019 - NAEON. Patient reports she was able to tolerate clear diet without nausea or vomiting. Pain well controlled on scheduled pain medications. 1x flatus, no BMs. Bah in place, draining clear urine. Has not ambulated yet.   2019 - Stable. Reports some pain with increased movement yesterday. Pain well controlled on medications. No flatus, no BMs. Urinating without issue. Ambulating in the halls and in her room without difficulty  2019- Stable. Patient reports 1x episode of vomit overnight however reports improvement after this event. Pain well controlled on medications. Passing flatus, no BMs. Urinating and ambulating in the halls without issue  2019 - Patient reports that she had nausea throughout the day, particularly when she received medications on an empty stomach. Since then however, patient reports improvement. No vomiting. Has been able to tolerate clear  liquids without issue. No abdominal pain - has not required any PRN medications overnight. Passing flatus, no BMs. Ambulating without difficulty. Minimal vaginal spotting.  11/09/2019- Improvement in nausea. She states that overnight she did not experience any nausea or vomiting. She has been able to tolerate clear liquids without issue. No abdominal pain. Did get suppository yesterday and had 1x large BM and has been intermittently passing gas since. She believes this helped extremely with her symptoms. Urinating appropriately. Ambulating without issue  11/10/2019 - NAEON. Patient able to tolerate regular diet without any nausea or vomiting. Abdominal pain has been well controlled on scheduled pain medications, has not required additional PRN medications otherwise. Had 3x BMs yesterday throughout the day and 1x overnight, passing gas intermittently. Urinating and ambulating without issue. Anticipate discharge this AM.    Interval History:   See above    Scheduled Meds:   enoxaparin  40 mg Subcutaneous Daily    famotidine  20 mg Oral BID    ibuprofen  600 mg Oral Q6H    senna-docusate 8.6-50 mg  1 tablet Oral BID     Continuous Infusions:  PRN Meds:bisacodyl, diphenhydrAMINE, diphenhydrAMINE, HYDROmorphone, oxyCODONE, oxyCODONE, prochlorperazine, promethazine (PHENERGAN) IVPB    Review of patient's allergies indicates:  No Known Allergies    Objective:     Vital Signs (Most Recent):  Temp: 98.2 °F (36.8 °C) (11/10/19 0456)  Pulse: 71 (11/10/19 0456)  Resp: 18 (11/10/19 0456)  BP: (!) 147/96 (11/10/19 0456)  SpO2: 98 % (11/10/19 0456) Vital Signs (24h Range):  Temp:  [98 °F (36.7 °C)-98.8 °F (37.1 °C)] 98.2 °F (36.8 °C)  Pulse:  [70-77] 71  Resp:  [16-18] 18  SpO2:  [96 %-98 %] 98 %  BP: (126-147)/(85-96) 147/96     Weight: 61.7 kg (136 lb)  Body mass index is 23.34 kg/m².    Intake/Output - Last 3 Shifts       11/08 0700 - 11/09 0659 11/09 0700 - 11/10 0659    P.O. 740     I.V. (mL/kg) 965 (15.6) 932.6 (15.1)     Total Intake(mL/kg) 1705 (27.6) 932.6 (15.1)    Urine (mL/kg/hr) 2000 (1.4) 800 (0.5)    Emesis/NG output 2     Stool  0    Total Output 2002 800    Net -297 +132.6          Stool Occurrence  3 x             Physical Exam:   Constitutional: She is oriented to person, place, and time. She appears well-developed and well-nourished.       Cardiovascular: Normal rate and regular rhythm.     Pulmonary/Chest: Effort normal. No respiratory distress.        Abdominal: Soft. She exhibits no distension. Abdominal incision: Midline vertical incision which appears clean/dry/intact. There is no tenderness.                 Neurological: She is alert and oriented to person, place, and time. No cranial nerve deficit.    Skin: Skin is warm and dry.    Psychiatric: She has a normal mood and affect. Her behavior is normal. Judgment and thought content normal.       Lines/Drains/Airways     Peripheral Intravenous Line                 Peripheral IV - Single Lumen 11/06/19 1300 Anterior;Left Forearm 3 days              UOP: urine output has not been recorded by nursing staff however adequate per patient     Assessment/Plan:     * S/P abdominal hysterectomy and left salpingo-oophorectomy, omentectomy, LND  - continue routine postop care.   - continue pain control with tordaol>ibuprofen/percocet/Dilaudid prn for breakthrough  - Has been tolerating regular diet without issue  - Phenergan/zofran for nausea   - ambulate TID. IS bedside.  - UOP appropriate overnight   - H/h: 11/32/275  - DVT ppx: TEDs/SCDS and lovenox  - Anticipate discharge today as patient is meeting all post operative milestones, will discuss with staff          VTE Risk Mitigation (From admission, onward)         Ordered     enoxaparin injection 40 mg  Daily      11/04/19 5317              Camille Perez MD  Gynecologic Oncology  [facility]

## 2019-11-10 NOTE — DISCHARGE SUMMARY
[facility]  Gynecologic Oncology  Discharge Summary    Patient Name: Liz Coto  MRN: [id number]  Admission Date: 2019  Hospital Length of Stay: 6 days  Discharge Date and Time:  11/10/2019  Attending Physician: Pérez Shukla MD   Discharging Provider: Camille Perez MD  Primary Care Provider: Griselda Alegre MD    HPI:   Liz Coto is a 64 y.o. [id number] who is admitted for post operative care, status post HOLLY/BSO/lysis of adhesions/omentectomy /lymphadenectomoy and debulking of neoplasm. Surgical oncology was also consulted intra operatively to help assist in excision of portion of the small bowel.       Hospital Course:  2019 - NAEON. Patient reports she was able to tolerate clear diet without nausea or vomiting. Pain well controlled on scheduled pain medications. 1x flatus, no BMs. Bah in place, draining clear urine. Has not ambulated yet.   2019 - Stable. Reports some pain with increased movement yesterday. Pain well controlled on medications. No flatus, no BMs. Urinating without issue. Ambulating in the halls and in her room without difficulty  2019- Stable. Patient reports 1x episode of vomit overnight however reports improvement after this event. Pain well controlled on medications. Passing flatus, no BMs. Urinating and ambulating in the halls without issue  2019 - Patient reports that she had nausea throughout the day, particularly when she received medications on an empty stomach. Since then however, patient reports improvement. No vomiting. Has been able to tolerate clear liquids without issue. No abdominal pain - has not required any PRN medications overnight. Passing flatus, no BMs. Ambulating without difficulty. Minimal vaginal spotting.  2019- Improvement in nausea. She states that overnight she did not experience any nausea or vomiting. She has been able to tolerate clear liquids without issue. No abdominal pain.  Did get suppository yesterday and had 1x large BM and has been intermittently passing gas since. She believes this helped extremely with her symptoms. Urinating appropriately. Ambulating without issue  11/10/2019 - YNES. Patient able to tolerate regular diet without any nausea or vomiting. Abdominal pain has been well controlled on scheduled pain medications, has not required additional PRN medications otherwise. Had 3x BMs yesterday throughout the day, passing gas intermittently. Urinating and ambulating without issue. Anticipate discharge this AM.        Procedure(s) (LRB):  SALPINGO-OOPHORECTOMY, BILATERAL (N/A)  DEBULKING, NEOPLASM (N/A)  LYSIS, ADHESIONS (N/A)  HYSTERECTOMY, TOTAL, ABDOMINAL (N/A)  OMENTECTOMY (N/A)  EXCISION, SMALL INTESTINE (N/A)  LYMPHADENECTOMY, PARA-AORTIC (N/A)  LYMPHADENECTOMY, PELVIS (Bilateral)         Pending Diagnostic Studies:     Procedure Component Value Units Date/Time    Cytology Specimen-Medical Cytology (Fluid/Wash/Brush) [[id number]] Collected:  11/04/19 1111    Order Status:  Sent Lab Status:  No result     Specimen:  Diaphragm brushing         Final Active Diagnoses:    Diagnosis Date Noted POA    PRINCIPAL PROBLEM:  S/P abdominal hysterectomy and left salpingo-oophorectomy, omentectomy, LND [Z90.710, Z90.79, Z90.721] 11/04/2019 Not Applicable    Pelvic mass in female [R19.00] 11/04/2019 Yes      Problems Resolved During this Admission:    Diagnosis Date Noted Date Resolved POA    Pelvic mass in female [R19.00] 10/05/2019 11/04/2019 Yes        Does this patient meet criteria for extended DVT prophylaxis? Yes, patient was prescribed Lovenox     Discharged Condition: good    Disposition: Home or Self Care    Follow Up:  Follow-up Information     Pérez Shukla MD. Go on 12/6/2019.    Specialty:  Gynecologic Oncology  Why:  Follow-Up Appointment at 1:30 PM  Contact information:  [address]  [facility] [address] [phone]                 Patient Instructions:       Diet Adult Regular     Pelvic Rest   Order Comments: Pelvic rest until cleared by MD. Nothing in vagina till cleared by MD including tampons, douching, intercourse     Notify your health care provider if you experience any of the following:  temperature >100.4     Notify your health care provider if you experience any of the following:  severe uncontrolled pain     Notify your health care provider if you experience any of the following:  redness, tenderness, or signs of infection (pain, swelling, redness, odor or green/yellow discharge around incision site)     Notify your health care provider if you experience any of the following:  persistent nausea and vomiting or diarrhea     Notify your health care provider if you experience any of the following:  difficulty breathing or increased cough     Notify your health care provider if you experience any of the following:  severe persistent headache     Notify your health care provider if you experience any of the following:  worsening rash     Notify your health care provider if you experience any of the following:  persistent dizziness, light-headedness, or visual disturbances     Notify your health care provider if you experience any of the following:  increased confusion or weakness     Notify your health care provider if you experience any of the following:   Order Comments: Notify MD if bleeding 1 pad/hour for 2 consecutive hours.     No dressing needed     Medications:  Reconciled Home Medications:      Medication List      START taking these medications    enoxaparin 40 mg/0.4 mL Syrg  Commonly known as:  LOVENOX  Inject 0.4 mLs (40 mg total) into the skin once daily for 21 days     ibuprofen 600 MG tablet  Commonly known as:  ADVIL,MOTRIN  Take 1 tablet (600 mg total) by mouth every 6 (six) hours.     oxyCODONE 5 MG immediate release tablet  Commonly known as:  ROXICODONE  Take 1 tablet (5 mg total) by mouth every 6 (six) hours as needed.            Camille  MD Chris  Gynecologic Oncology  [facility]

## 2019-11-10 NOTE — PLAN OF CARE
Afebrile. Free from falls or injury. No complaints of pain. [id number] infusing at 75. Ibuprofen given as scheduled. Bed locked in lowest position, non skid socks on, call light within reach. Pt instructed to call if any assistance is needed. Vitals stable. Will cont to christel pt.

## 2019-11-10 NOTE — SUBJECTIVE & OBJECTIVE
Interval History:   See above    Scheduled Meds:   enoxaparin  40 mg Subcutaneous Daily    famotidine  20 mg Oral BID    ibuprofen  600 mg Oral Q6H    senna-docusate 8.6-50 mg  1 tablet Oral BID     Continuous Infusions:  PRN Meds:bisacodyl, diphenhydrAMINE, diphenhydrAMINE, HYDROmorphone, oxyCODONE, oxyCODONE, prochlorperazine, promethazine (PHENERGAN) IVPB    Review of patient's allergies indicates:  No Known Allergies    Objective:     Vital Signs (Most Recent):  Temp: 98.2 °F (36.8 °C) (11/10/19 0456)  Pulse: 71 (11/10/19 0456)  Resp: 18 (11/10/19 0456)  BP: (!) 147/96 (11/10/19 0456)  SpO2: 98 % (11/10/19 0456) Vital Signs (24h Range):  Temp:  [98 °F (36.7 °C)-98.8 °F (37.1 °C)] 98.2 °F (36.8 °C)  Pulse:  [70-77] 71  Resp:  [16-18] 18  SpO2:  [96 %-98 %] 98 %  BP: (126-147)/(85-96) 147/96     Weight: 61.7 kg (136 lb)  Body mass index is 23.34 kg/m².    Intake/Output - Last 3 Shifts       11/08 0700 - 11/09 0659 11/09 0700 - 11/10 0659    P.O. 740     I.V. (mL/kg) 965 (15.6) 932.6 (15.1)    Total Intake(mL/kg) 1705 (27.6) 932.6 (15.1)    Urine (mL/kg/hr) 2000 (1.4) 800 (0.5)    Emesis/NG output 2     Stool  0    Total Output 2002 800    Net -297 +132.6          Stool Occurrence  3 x             Physical Exam:   Constitutional: She is oriented to person, place, and time. She appears well-developed and well-nourished.       Cardiovascular: Normal rate and regular rhythm.     Pulmonary/Chest: Effort normal. No respiratory distress.        Abdominal: Soft. She exhibits no distension. Abdominal incision: Midline vertical incision which appears clean/dry/intact. There is no tenderness.                 Neurological: She is alert and oriented to person, place, and time. No cranial nerve deficit.    Skin: Skin is warm and dry.    Psychiatric: She has a normal mood and affect. Her behavior is normal. Judgment and thought content normal.       Lines/Drains/Airways     Peripheral Intravenous Line                  Peripheral IV - Single Lumen 11/06/19 1300 Anterior;Left Forearm 3 days              UOP: urine output has not been recorded by nursing staff however adequate per patient

## 2019-11-10 NOTE — ASSESSMENT & PLAN NOTE
- continue routine postop care.   - continue pain control with tordaol>ibuprofen/percocet/Dilaudid prn for breakthrough  - Has been tolerating regular diet without issue  - Phenergan/zofran for nausea   - ambulate TID. IS bedside.  - UOP appropriate overnight   - H/h: 11/32/275  - DVT ppx: TEDs/SCDS and lovenox  - Anticipate discharge today as patient is meeting all post operative milestones, will discuss with staff

## 2019-11-10 NOTE — ASSESSMENT & PLAN NOTE
64 y. Female s/p Small bowel resection with side to side anastomosis + Aortocaval Lymphadenectomy    - Diet advance as tolerated to regular diet   - PRN pain control  - Remainder of care per primary team  - Gen surg will sign off

## 2019-11-10 NOTE — SUBJECTIVE & OBJECTIVE
Interval History:   Multiple BMs after suppository. Feeling much better. Tolerating clears with no nausea or vomiting.     Medications:  Continuous Infusions:    Scheduled Meds:   enoxaparin  40 mg Subcutaneous Daily    famotidine  20 mg Oral BID    ibuprofen  600 mg Oral Q6H    senna-docusate 8.6-50 mg  1 tablet Oral BID     PRN Meds:bisacodyl, diphenhydrAMINE, diphenhydrAMINE, HYDROmorphone, oxyCODONE, oxyCODONE, prochlorperazine, promethazine (PHENERGAN) IVPB     Review of patient's allergies indicates:  No Known Allergies  Objective:     Vital Signs (Most Recent):  Temp: 98 °F (36.7 °C) (11/10/19 0710)  Pulse: 70 (11/10/19 0710)  Resp: 18 (11/10/19 0710)  BP: 126/88 (11/10/19 0710)  SpO2: 95 % (11/10/19 0710) Vital Signs (24h Range):  Temp:  [98 °F (36.7 °C)-98.8 °F (37.1 °C)] 98 °F (36.7 °C)  Pulse:  [70-77] 70  Resp:  [16-18] 18  SpO2:  [95 %-98 %] 95 %  BP: (126-147)/(85-96) 126/88     Weight: 61.7 kg (136 lb)  Body mass index is 23.34 kg/m².    Intake/Output - Last 3 Shifts       11/08 0700 - 11/09 0659 11/09 0700 - 11/10 0659 11/10 0700 - 11/11 0659    P.O. 740      I.V. (mL/kg) 965 (15.6) 932.6 (15.1)     Total Intake(mL/kg) 1705 (27.6) 932.6 (15.1)     Urine (mL/kg/hr) 2000 (1.4) 800 (0.5)     Emesis/NG output 2      Stool  0     Total Output 2002 800     Net -297 +132.6            Stool Occurrence  3 x           Physical Exam   Constitutional: She is oriented to person, place, and time. She appears well-developed and well-nourished. No distress.   Cardiovascular: Normal rate and regular rhythm.   Pulmonary/Chest: Effort normal. No respiratory distress.   Abdominal: Soft. She exhibits no distension. There is no tenderness. There is no rebound and no guarding.   Neurological: She is alert and oriented to person, place, and time.   Skin: Skin is warm.   Psychiatric: She has a normal mood and affect. Her behavior is normal.

## 2019-11-12 ENCOUNTER — TELEPHONE (OUTPATIENT)
Dept: GYNECOLOGIC ONCOLOGY | Facility: CLINIC | Age: 64
End: 2019-11-12

## 2019-11-12 NOTE — TELEPHONE ENCOUNTER
11/12/19 rec'd pc from pt wanting to change form lovenox to an oral med. Pt states she was not given instructions on how to administer  injection. Advised pt too early to change to oral medication. Offered pt to come to clinic for teaching. Pt denied help and stated she will ask her friend who is a nurse to show her. Advise pt to call back if anything changes. JUSTIN/MA

## 2019-11-12 NOTE — TELEPHONE ENCOUNTER
----- Message from Dina Watts sent at 11/12/2019 10:25 AM CST -----  Pt recently had surgery and was prescribed injections. Pt says she prefers pills instead  [phone]

## 2019-11-20 ENCOUNTER — DOCUMENTATION ONLY (OUTPATIENT)
Dept: GYNECOLOGIC ONCOLOGY | Facility: CLINIC | Age: 64
End: 2019-11-20

## 2019-11-20 DIAGNOSIS — C57.01 FALLOPIAN TUBE CANCER, CARCINOMA, RIGHT: ICD-10-CM

## 2019-11-20 DIAGNOSIS — C57.01 FALLOPIAN TUBE CANCER, CARCINOMA, RIGHT: Primary | ICD-10-CM

## 2019-11-20 NOTE — PHYSICIAN QUERY
PT Name: Liz Coto  MR #: [id number]    Physician Query Form - Pathology Findings Clarification     CDS/: ABDIAS Steinberg,RNC-[email or web address]        Contact information:[email or web address]  This form is a permanent document in the medical record.     Query Date: November 20, 2019      By submitting this query, we are merely seeking further clarification of documentation.  Please utilize your independent clinical judgment when addressing the question(s) below.      The medical record contains the following:     Findings Supporting Clinical Information Location in Medical Record   FINAL PATHOLOGIC DIAGNOSIS  1. 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 UTERUS, CERVIX, FALLOPIAN TUBE, AND OVARY, TOTAL ABDOMINAL HYSTERECTOMY AND LEFT  SALPINGO-OOPHORECTOMY:  - Benign endometrial polyp (1.7 cm)  - Background weakly proliferative endometrium, negative for hyperplasia or malignancy  - Intramural leiomyoma (2.5 cm)  - Left ovary with cystic ovarian follicles  3. LYMPH NODE, RIGHT PELVIC AND COMMON ILIAC, EXCISION:  - One lymph node, negative for metastatic carcinoma (0/1)  4. LYMPH NODE, RIGHT OBTURATOR, EXCISION:  - One lymph node, negative for metastatic carcinoma (0/1)  5. LYMPH NODES, LEFT PELVIC, EXCISION:  - Two lymph nodes, negative for metastatic carcinoma (0/2)  - Benign intranodal tubal epithelium, consistent with endosalpingiosis  6. LYMPH NODES, LEFT OBTURATOR, EXCISION:  - One lymph node, negative for metastatic carcinoma (0/1)  10. OMENTUM, D, PARTIAL  OMENTECTOMY:  - Metastatic carcinoma present in one of two omental lymph nodes (1/2)  - Fibroadipose tissue with acute and chronic inflammation, negative for carcinoma  12. LYMPH NODES, AORTOCAVAL, EXCISION:  - Metastatic carcinoma present in three of three lymph nodes (3/3)  13. SMALL INTESTINE, PARTIAL ENTERECTOMY:  - Enteric tissue with marked fibrinous serosal adhesions, acute and chronic serositis, and granulation tissue  - Serous carcinoma focally present within lymphatic spaces                                                                                                                 OPERATIVE FINDINGS:  Present arising in the right adnexa is a 10 cm mass.  There   are several loops of small bowel that were densely adherent to it.  In the   course of taking these down, serosal injuries occurred, subsequently,   necessitating a small bowel resection.  The left tube and ovary are normal.  The   right ovary is normal and this mass appears to be rising more from the superior   aspect of the ovary or possibly fallopian tube.  There was also a periaortic sherry mass   approximately 3 x 2 cm.  There was no other evidence for metastatic disease.    All other peritoneal surfaces were smooth.  Both diaphragms are smooth.  Spleen   is normal.  Liver and gallbladder are normal to palpation.  Omentum appears   normal other than omentum that was adhered to the mass itself.  Small bowel was   run throughout its entire length and was otherwise normal other than as noted   Above.    PROCEDURES:  1.  Exploratory laparotomy.  2.  Total abdominal hysterectomy with bilateral salpingo-oophorectomy.  3.  Lysis of small bowel adhesions.  4.  Resection of aortocaval sherry mass with Dr. Clarence Colmenares.  5.  Small bowel resection with side-to-side and then functional end-to-end small   bowel resection with Dr. Clarence Colmenares. Pathology report  11/4                                                                                                              Op note 11/4         Please document the clinical significance of the Pathologists findings of  1. 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 UTERUS, CERVIX, FALLOPIAN TUBE, AND OVARY, TOTAL ABDOMINAL HYSTERECTOMY AND LEFT  SALPINGO-OOPHORECTOMY:  - Benign endometrial polyp (1.7 cm)  - Background weakly proliferative endometrium, negative for hyperplasia or malignancy  - Intramural leiomyoma (2.5 cm)  - Left ovary with cystic ovarian follicles  3. LYMPH NODE, RIGHT PELVIC AND COMMON ILIAC, EXCISION:  - One lymph node, negative for metastatic carcinoma (0/1)  4. LYMPH NODE, RIGHT OBTURATOR, EXCISION:  - One lymph node, negative for metastatic carcinoma (0/1)  5. LYMPH NODES, LEFT PELVIC, EXCISION:  - Two lymph nodes, negative for metastatic carcinoma (0/2)  - Benign intranodal tubal epithelium, consistent with endosalpingiosis  6. LYMPH NODES, LEFT OBTURATOR, EXCISION:  - One lymph node, negative for metastatic carcinoma (0/1)  10. OMENTUM, D, PARTIAL OMENTECTOMY:  - Metastatic carcinoma present in one of two omental lymph nodes (1/2)  - Fibroadipose tissue with acute and chronic inflammation, negative for carcinoma  12. LYMPH NODES, AORTOCAVAL, EXCISION:  - Metastatic carcinoma present in three of three lymph nodes (3/3)  13. SMALL INTESTINE, PARTIAL ENTERECTOMY:  - Enteric tissue with marked fibrinous serosal adhesions, acute and chronic serositis, and granulation tissue  -  Serous carcinoma focally present within lymphatic spaces    [ x  ] I agree with the Pathology Findings   [   ] I do not agree with the Pathology Findings   [   ] Other/Clarification of Findings:   [   ] Clinically Insignificant   [  ] Clinically Undetermined       Please document in your progress notes daily for the duration of treatment until resolved and include in your discharge summary.

## 2019-11-21 ENCOUNTER — TELEPHONE (OUTPATIENT)
Dept: ADMINISTRATIVE | Facility: OTHER | Age: 64
End: 2019-11-21

## 2019-11-21 DIAGNOSIS — C57.01 FALLOPIAN TUBE CANCER, CARCINOMA, RIGHT: Primary | ICD-10-CM

## 2019-11-22 ENCOUNTER — OFFICE VISIT (OUTPATIENT)
Dept: GYNECOLOGIC ONCOLOGY | Facility: CLINIC | Age: 64
End: 2019-11-22
Payer: COMMERCIAL

## 2019-11-22 ENCOUNTER — TELEPHONE (OUTPATIENT)
Dept: GYNECOLOGIC ONCOLOGY | Facility: CLINIC | Age: 64
End: 2019-11-22

## 2019-11-22 VITALS
BODY MASS INDEX: 21.71 KG/M2 | HEIGHT: 64 IN | WEIGHT: 127.19 LBS | HEART RATE: 83 BPM | SYSTOLIC BLOOD PRESSURE: 132 MMHG | DIASTOLIC BLOOD PRESSURE: 97 MMHG

## 2019-11-22 DIAGNOSIS — R11.0 CHEMOTHERAPY-INDUCED NAUSEA: ICD-10-CM

## 2019-11-22 DIAGNOSIS — C57.01 FALLOPIAN TUBE CANCER, CARCINOMA, RIGHT: Primary | ICD-10-CM

## 2019-11-22 DIAGNOSIS — L65.9 ALOPECIA: ICD-10-CM

## 2019-11-22 DIAGNOSIS — T45.1X5A CHEMOTHERAPY-INDUCED NAUSEA: ICD-10-CM

## 2019-11-22 PROCEDURE — 3008F BODY MASS INDEX DOCD: CPT | Mod: CPTII,S$GLB,, | Performed by: OBSTETRICS & GYNECOLOGY

## 2019-11-22 PROCEDURE — 99999 PR PBB SHADOW E&M-EST. PATIENT-LVL II: CPT | Mod: PBBFAC,,, | Performed by: OBSTETRICS & GYNECOLOGY

## 2019-11-22 PROCEDURE — 99214 OFFICE O/P EST MOD 30 MIN: CPT | Mod: 24,S$GLB,, | Performed by: OBSTETRICS & GYNECOLOGY

## 2019-11-22 PROCEDURE — 3008F PR BODY MASS INDEX (BMI) DOCUMENTED: ICD-10-PCS | Mod: CPTII,S$GLB,, | Performed by: OBSTETRICS & GYNECOLOGY

## 2019-11-22 PROCEDURE — 99214 PR OFFICE/OUTPT VISIT, EST, LEVL IV, 30-39 MIN: ICD-10-PCS | Mod: 24,S$GLB,, | Performed by: OBSTETRICS & GYNECOLOGY

## 2019-11-22 PROCEDURE — 99999 PR PBB SHADOW E&M-EST. PATIENT-LVL II: ICD-10-PCS | Mod: PBBFAC,,, | Performed by: OBSTETRICS & GYNECOLOGY

## 2019-11-22 RX ORDER — ONDANSETRON HYDROCHLORIDE 8 MG/1
8 TABLET, FILM COATED ORAL EVERY 12 HOURS PRN
Qty: 20 TABLET | Refills: 2 | Status: SHIPPED | OUTPATIENT
Start: 2019-11-22 | End: 2020-07-22

## 2019-11-22 NOTE — PROGRESS NOTES
"Subjective:       Patient ID: Liz Coto is a 64 y.o. female.    Chief Complaint: No chief complaint on file.    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   Her oncologic history is:  Nov 2019: HOLLY/BSO/small bowel resection/resection of PA sherry mass and omentectomy. Final pathology showed FIGO stage [id number] grade 3 right fallopian tube adenocarcinoma with retroperitoneal nodes. 2 omental nodes also found. No gross omental involvement. No residual disease.     Review of Systems   Constitutional: Negative for chills, fatigue and fever.   Gastrointestinal: Negative for abdominal pain.   Genitourinary: Negative for vaginal bleeding.   Neurological: Negative for weakness.       Objective:   BP (!) 132/97 (BP Location: Left arm, Patient Position: Sitting, BP Method: Medium (Automatic))   Pulse 83   Ht 5' 4" (1.626 m)   Wt 57.7 kg (127 lb 3.3 oz)   LMP  (LMP Unknown)   BMI 21.83 kg/m²      Physical Exam   Constitutional: She is oriented to person, place, and time. She appears well-developed and well-nourished.   Abdominal: Soft. She exhibits no distension and no mass. There is no tenderness. There is no rebound and no guarding.   Healing midline incision.    Neurological: She is alert and oriented to person, place, and time.   Skin: Skin is warm and dry.   Psychiatric: She has a normal mood and affect. Her behavior is normal. Judgment and thought content normal.       Assessment:       1. Fallopian tube cancer, carcinoma, right    2. Chemotherapy-induced nausea    3. Alopecia        Plan:   Fallopian tube cancer, carcinoma, right   FIGO stage [id number] grade 3 right fallopian tube adenocarcinoma.   Discussed need for chemotherapy with taxol and carboplatin q 21 days x 6 cycles.   Chemotherapy consent signed.   Scheduled for chemo class  Treatment plan has been placed.     -     Basic metabolic panel; Standing  -     CBC " Oncology; Standing    Chemotherapy-induced nausea  -     ondansetron (ZOFRAN) 8 MG tablet; Take 1 tablet (8 mg total) by mouth every 12 (twelve) hours as needed for Nausea.  Dispense: 20 tablet; Refill: 2    Alopecia  -     HME - OTHER      Total encounter time of 30 minutes with 20 minutes spent discussing diagnosis and treatment plan.   Questions answered.   Has po appt for 12/6/2019  Chemo to start after this.

## 2019-11-22 NOTE — LETTER
November 22, 2019    Liz Coto  [address]  [facility] [id number]             [facility]  [address] [address]  Phone: [phone] To whom it may concern:    Ms. Coto had recent surgery. Please make accommodations for her.     Sincerely,       Pérez Shukla MD

## 2019-11-22 NOTE — TELEPHONE ENCOUNTER
----- Message from Breana Reese RN sent at 11/22/2019  1:58 PM CST -----      ----- Message -----  From: Pérez Shukla MD  Sent: 11/22/2019   1:30 PM CST  To: Breana Reese RN    Either is fine with me.   ----- Message -----  From: Breana Reese RN  Sent: 11/21/2019   4:43 PM CST  To: Pérez Shukla MD    Absolutely, but I can't do 12/6 because I have chemo class at [facility] at 2 on 12/6. Would it be possible to see if she can see you on 12/5 or potentially push her appt time up on 12/6 to that morning so that I could do one on one teaching with her and make it to [facility] for 2?    Breana  ----- Message -----  From: Pérez Shukla MD  Sent: 11/21/2019  12:41 PM CST  To: Breana Reese RN    Can you do the chemo class before 12/12. I am seeing her 12/6 and would like to start shortly afterwards.   ----- Message -----  From: Breana Reese RN  Sent: 11/20/2019   9:45 AM CST  To: Pérez Shukla MD    I see Dina scheduled her for 11/22 at 10:30 and I have placed her on the schedule for chemo class on 12/12.    Thanks  Breana  ----- Message -----  From: Pérez Shukla MD  Sent: 11/20/2019   9:04 AM CST  To: Breana Reese RN, Dina Harpace,   Please give her an appt for 11/22 Friday at 10:30.     Breana,  This will be a new start for taxol and carbo q 21 days. Won't start until after 4 week post op visit on 12/6. Will need chemo class. Treatment plan has been placed.     Thank you.

## 2019-12-05 ENCOUNTER — OFFICE VISIT (OUTPATIENT)
Dept: GYNECOLOGIC ONCOLOGY | Facility: CLINIC | Age: 64
End: 2019-12-05
Payer: COMMERCIAL

## 2019-12-05 ENCOUNTER — CLINICAL SUPPORT (OUTPATIENT)
Dept: GYNECOLOGIC ONCOLOGY | Facility: CLINIC | Age: 64
End: 2019-12-05
Payer: COMMERCIAL

## 2019-12-05 ENCOUNTER — LAB VISIT (OUTPATIENT)
Dept: LAB | Facility: HOSPITAL | Age: 64
End: 2019-12-05
Payer: COMMERCIAL

## 2019-12-05 VITALS
WEIGHT: 123.88 LBS | SYSTOLIC BLOOD PRESSURE: 128 MMHG | HEIGHT: 64 IN | BODY MASS INDEX: 21.15 KG/M2 | DIASTOLIC BLOOD PRESSURE: 86 MMHG

## 2019-12-05 DIAGNOSIS — Z90.79 S/P ABDOMINAL HYSTERECTOMY AND LEFT SALPINGO-OOPHORECTOMY: ICD-10-CM

## 2019-12-05 DIAGNOSIS — C57.01 FALLOPIAN TUBE CANCER, CARCINOMA, RIGHT: ICD-10-CM

## 2019-12-05 DIAGNOSIS — Z90.721 S/P ABDOMINAL HYSTERECTOMY AND LEFT SALPINGO-OOPHORECTOMY: ICD-10-CM

## 2019-12-05 DIAGNOSIS — Z51.11 ENCOUNTER FOR CHEMOTHERAPY MANAGEMENT: ICD-10-CM

## 2019-12-05 DIAGNOSIS — C57.01 FALLOPIAN TUBE CANCER, CARCINOMA, RIGHT: Primary | ICD-10-CM

## 2019-12-05 DIAGNOSIS — Z90.710 S/P ABDOMINAL HYSTERECTOMY AND LEFT SALPINGO-OOPHORECTOMY: ICD-10-CM

## 2019-12-05 PROBLEM — R19.00 PELVIC MASS IN FEMALE: Status: RESOLVED | Noted: 2019-11-04 | Resolved: 2019-12-05

## 2019-12-05 LAB
ANION GAP SERPL CALC-SCNC: 7 MMOL/L (ref 8–16)
BUN SERPL-MCNC: 8 MG/DL (ref 8–23)
CALCIUM SERPL-MCNC: 9.7 MG/DL (ref 8.7–10.5)
CANCER AG125 SERPL-ACNC: 20 U/ML (ref 0–30)
CHLORIDE SERPL-SCNC: 105 MMOL/L (ref 95–110)
CO2 SERPL-SCNC: 28 MMOL/L (ref 23–29)
CREAT SERPL-MCNC: 0.8 MG/DL (ref 0.5–1.4)
ERYTHROCYTE [DISTWIDTH] IN BLOOD BY AUTOMATED COUNT: 15.2 % (ref 11.5–14.5)
EST. GFR  (AFRICAN AMERICAN): >60 ML/MIN/1.73 M^2
EST. GFR  (NON AFRICAN AMERICAN): >60 ML/MIN/1.73 M^2
GLUCOSE SERPL-MCNC: 91 MG/DL (ref 70–110)
HCT VFR BLD AUTO: 38.3 % (ref 37–48.5)
HGB BLD-MCNC: 12.1 G/DL (ref 12–16)
IMM GRANULOCYTES # BLD AUTO: 0 K/UL (ref 0–0.04)
MCH RBC QN AUTO: 29.3 PG (ref 27–31)
MCHC RBC AUTO-ENTMCNC: 31.6 G/DL (ref 32–36)
MCV RBC AUTO: 93 FL (ref 82–98)
NEUTROPHILS # BLD AUTO: 2 K/UL (ref 1.8–7.7)
PLATELET # BLD AUTO: 350 K/UL (ref 150–350)
PMV BLD AUTO: 10.1 FL (ref 9.2–12.9)
POTASSIUM SERPL-SCNC: 3.6 MMOL/L (ref 3.5–5.1)
RBC # BLD AUTO: 4.13 M/UL (ref 4–5.4)
SODIUM SERPL-SCNC: 140 MMOL/L (ref 136–145)
WBC # BLD AUTO: 6.43 K/UL (ref 3.9–12.7)

## 2019-12-05 PROCEDURE — 99999 PR PBB SHADOW E&M-EST. PATIENT-LVL II: CPT | Mod: PBBFAC,,, | Performed by: OBSTETRICS & GYNECOLOGY

## 2019-12-05 PROCEDURE — 99024 PR POST-OP FOLLOW-UP VISIT: ICD-10-PCS | Mod: S$GLB,,, | Performed by: OBSTETRICS & GYNECOLOGY

## 2019-12-05 PROCEDURE — 99999 PR PBB SHADOW E&M-EST. PATIENT-LVL II: ICD-10-PCS | Mod: PBBFAC,,, | Performed by: OBSTETRICS & GYNECOLOGY

## 2019-12-05 PROCEDURE — 80048 BASIC METABOLIC PNL TOTAL CA: CPT

## 2019-12-05 PROCEDURE — 85027 COMPLETE CBC AUTOMATED: CPT

## 2019-12-05 PROCEDURE — 99024 POSTOP FOLLOW-UP VISIT: CPT | Mod: S$GLB,,, | Performed by: OBSTETRICS & GYNECOLOGY

## 2019-12-05 PROCEDURE — 36415 COLL VENOUS BLD VENIPUNCTURE: CPT

## 2019-12-05 PROCEDURE — 86304 IMMUNOASSAY TUMOR CA 125: CPT

## 2019-12-05 RX ORDER — SODIUM CHLORIDE 0.9 % (FLUSH) 0.9 %
10 SYRINGE (ML) INJECTION
Status: CANCELLED | OUTPATIENT
Start: 2019-12-11

## 2019-12-05 RX ORDER — FAMOTIDINE 10 MG/ML
20 INJECTION INTRAVENOUS
Status: CANCELLED | OUTPATIENT
Start: 2019-12-11

## 2019-12-05 RX ORDER — EPINEPHRINE 0.3 MG/.3ML
0.3 INJECTION SUBCUTANEOUS ONCE AS NEEDED
Status: CANCELLED | OUTPATIENT
Start: 2019-12-11

## 2019-12-05 RX ORDER — DIPHENHYDRAMINE HYDROCHLORIDE 50 MG/ML
50 INJECTION INTRAMUSCULAR; INTRAVENOUS ONCE AS NEEDED
Status: CANCELLED | OUTPATIENT
Start: 2019-12-11

## 2019-12-05 RX ORDER — HEPARIN 100 UNIT/ML
500 SYRINGE INTRAVENOUS
Status: CANCELLED | OUTPATIENT
Start: 2019-12-11

## 2019-12-05 NOTE — PROGRESS NOTES
Pt attended chemo class unaccompanied. Pt provided with chemocare sheets for treatment regimen. Pt allowed opportunity to ask questions and have concerns addressed.  Pt having baseline labs done today and provide appt sheets for 12/11 first chemo cycle appt. Pt also provided appt slips for C2 treatment, labs and provider visit. Provider card given to pt for future needs.

## 2019-12-05 NOTE — PROGRESS NOTES
Subjective:       Patient ID: Liz Coto is a 64 y.o. female.    Chief Complaint: Post-op Evaluation (4 week)    HPI     Patient comes in today for her 4 weeks post op visit. 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   Prior appt at 2 weeks to discuss pathology and recommendation for chemotherapy which has been approved. Consents were signed at last visit.    She continues to heal well from surgery with no complaints today. Wants to start chemo soon and resume work after the 1st of the year after she sees how she does with 1st cycle.      Her oncologic history is:  Nov 2019: HOLLY/BSO/small bowel resection/resection of PA sherry mass and omentectomy. Final pathology showed FIGO stage [id number] grade 3 right fallopian tube adenocarcinoma with retroperitoneal nodes. 2 omental nodes also found. No gross omental involvement. No residual disease.     Preop  10/2019 was 1097    Review of Systems   Constitutional: Negative for appetite change, chills, diaphoresis, fatigue, fever and unexpected weight change.   Respiratory: Negative for cough, chest tightness, shortness of breath and wheezing.    Cardiovascular: Negative for chest pain, palpitations and leg swelling.   Gastrointestinal: Negative for abdominal distention, abdominal pain, blood in stool, constipation, diarrhea, nausea and vomiting.   Genitourinary: Negative for difficulty urinating, dysuria, flank pain, frequency, hematuria, pelvic pain, vaginal bleeding, vaginal discharge and vaginal pain.   Musculoskeletal: Negative for arthralgias and back pain.   Skin: Negative for color change and rash.   Neurological: Negative for dizziness, weakness, numbness and headaches.   Hematological: Negative for adenopathy.   Psychiatric/Behavioral: Negative for confusion and sleep disturbance. The patient is not nervous/anxious.        Objective:   /86 (BP Location: Left arm, Patient Position: Sitting, BP Method:  "Medium (Automatic))   Ht 5' 4" (1.626 m)   Wt 56.2 kg (123 lb 14.4 oz)   LMP  (LMP Unknown)   BMI 21.27 kg/m²      Physical Exam   Constitutional: She is oriented to person, place, and time. She appears well-developed and well-nourished. No distress.   HENT:   Head: Normocephalic and atraumatic.   Eyes: No scleral icterus.   Neck: Normal range of motion.   Pulmonary/Chest: Effort normal. No respiratory distress.   Abdominal: Soft. She exhibits no distension and no mass. There is no tenderness. There is no rebound and no guarding. No hernia.   Midline abdominal incision C/D/I   Genitourinary:   Genitourinary Comments: Bimanual exam:  Vulva: no lesions. Normal appearance  Urethra: Normal size and location. No lesions  Bladder: No masses or tenderness.  Vagina: normal mucosa. No lesion. Cuff intact  Cervix: absent.   Uterus: absent.  Adnexa: no masses.  Rectovaginal: deferred today   Musculoskeletal: Normal range of motion. She exhibits no edema.   Neurological: She is alert and oriented to person, place, and time.   Skin: Skin is warm and dry. No rash noted. She is not diaphoretic. No pallor.   Psychiatric: She has a normal mood and affect. Her behavior is normal.   Nursing note and vitals reviewed.      Assessment:       1. Fallopian tube cancer, carcinoma, right    2. Encounter for chemotherapy management    3. S/P abdominal hysterectomy and left salpingo-oophorectomy, omentectomy, LND        Plan:   Fallopian tube cancer, carcinoma, right  -     ; Future; Expected date: 12/05/2019    Encounter for chemotherapy management    S/P abdominal hysterectomy and left salpingo-oophorectomy, omentectomy, LND    Healing well from surgery. Cleared to start chemotherapy  Chemo class today  May start with labs (CBC, BMP and ) and first cycle next week  RTC as scheduled    "

## 2019-12-11 ENCOUNTER — INFUSION (OUTPATIENT)
Dept: INFUSION THERAPY | Facility: HOSPITAL | Age: 64
End: 2019-12-11
Attending: OBSTETRICS & GYNECOLOGY
Payer: COMMERCIAL

## 2019-12-11 VITALS
TEMPERATURE: 98 F | WEIGHT: 123 LBS | DIASTOLIC BLOOD PRESSURE: 79 MMHG | RESPIRATION RATE: 18 BRPM | BODY MASS INDEX: 21.12 KG/M2 | OXYGEN SATURATION: 100 % | HEART RATE: 71 BPM | SYSTOLIC BLOOD PRESSURE: 114 MMHG

## 2019-12-11 DIAGNOSIS — C57.01 FALLOPIAN TUBE CANCER, CARCINOMA, RIGHT: Primary | ICD-10-CM

## 2019-12-11 PROCEDURE — 96411 CHEMO IV PUSH ADDL DRUG: CPT

## 2019-12-11 PROCEDURE — 63600175 PHARM REV CODE 636 W HCPCS: Performed by: OBSTETRICS & GYNECOLOGY

## 2019-12-11 PROCEDURE — 96361 HYDRATE IV INFUSION ADD-ON: CPT

## 2019-12-11 PROCEDURE — S0028 INJECTION, FAMOTIDINE, 20 MG: HCPCS | Performed by: OBSTETRICS & GYNECOLOGY

## 2019-12-11 PROCEDURE — 25000003 PHARM REV CODE 250: Performed by: OBSTETRICS & GYNECOLOGY

## 2019-12-11 PROCEDURE — 96375 TX/PRO/DX INJ NEW DRUG ADDON: CPT

## 2019-12-11 PROCEDURE — 96413 CHEMO IV INFUSION 1 HR: CPT

## 2019-12-11 PROCEDURE — 96367 TX/PROPH/DG ADDL SEQ IV INF: CPT

## 2019-12-11 RX ORDER — FAMOTIDINE 10 MG/ML
20 INJECTION INTRAVENOUS
Status: COMPLETED | OUTPATIENT
Start: 2019-12-11 | End: 2019-12-11

## 2019-12-11 RX ORDER — SODIUM CHLORIDE 0.9 % (FLUSH) 0.9 %
10 SYRINGE (ML) INJECTION
Status: DISCONTINUED | OUTPATIENT
Start: 2019-12-11 | End: 2019-12-11 | Stop reason: HOSPADM

## 2019-12-11 RX ORDER — EPINEPHRINE 0.3 MG/.3ML
0.3 INJECTION SUBCUTANEOUS ONCE AS NEEDED
Status: DISCONTINUED | OUTPATIENT
Start: 2019-12-11 | End: 2019-12-11 | Stop reason: HOSPADM

## 2019-12-11 RX ORDER — DIPHENHYDRAMINE HYDROCHLORIDE 50 MG/ML
50 INJECTION INTRAMUSCULAR; INTRAVENOUS ONCE AS NEEDED
Status: DISCONTINUED | OUTPATIENT
Start: 2019-12-11 | End: 2019-12-11 | Stop reason: HOSPADM

## 2019-12-11 RX ADMIN — SODIUM CHLORIDE 500 ML: 0.9 INJECTION, SOLUTION INTRAVENOUS at 01:12

## 2019-12-11 RX ADMIN — HYDROCORTISONE SODIUM SUCCINATE 100 MG: 100 INJECTION, POWDER, FOR SOLUTION INTRAMUSCULAR; INTRAVENOUS at 12:12

## 2019-12-11 RX ADMIN — PACLITAXEL 294 MG: 6 INJECTION, SOLUTION INTRAVENOUS at 12:12

## 2019-12-11 RX ADMIN — CARBOPLATIN 575 MG: 10 INJECTION, SOLUTION INTRAVENOUS at 12:12

## 2019-12-11 RX ADMIN — DIPHENHYDRAMINE HYDROCHLORIDE 50 MG: 50 INJECTION, SOLUTION INTRAMUSCULAR; INTRAVENOUS at 11:12

## 2019-12-11 RX ADMIN — DEXAMETHASONE SODIUM PHOSPHATE: 4 INJECTION, SOLUTION INTRA-ARTICULAR; INTRALESIONAL; INTRAMUSCULAR; INTRAVENOUS; SOFT TISSUE at 11:12

## 2019-12-11 RX ADMIN — FAMOTIDINE 20 MG: 10 INJECTION, SOLUTION INTRAVENOUS at 11:12

## 2019-12-11 NOTE — PLAN OF CARE
Patient unable to receive taxol today d/t reaction. Completed carbo/ivfs well today. PIV removed, catheter tip intact. Patient did attend chemo class. Re-educated on importance of frequent handwashing and monitoring for temp > 100.4 or greater. AVS given. Discharged home, ambulated independently. Verbalized understanding to call MD for any questions/concerns. Discharged home, ambulated independently.

## 2019-12-11 NOTE — PLAN OF CARE
Problem: Adult Inpatient Plan of Care  Goal: Optimal Comfort and Wellbeing  Intervention: Provide Person-Centered Care  Flowsheets (Taken 12/11/2019 1144)  Trust Relationship/Rapport: care explained; questions encouraged; choices provided; reassurance provided; emotional support provided; thoughts/feelings acknowledged; empathic listening provided; questions answered

## 2019-12-11 NOTE — NURSING
Started taxol infusion slowly, about 10 min into infusion, patient complained of extreme lower back pain unable to sit back and nausea and felt like she was about to vomit. Infusion stopped. Solucortef 100 mg administered. Notified Dr. Shukla, ordered to NOT give taxol today. Continue with carboplatin once patients symptoms are back to baseline.    Updated patient with plan of care. Verbalized understanding. Will continue to monitor closely.

## 2019-12-12 DIAGNOSIS — C57.01 FALLOPIAN TUBE CANCER, CARCINOMA, RIGHT: Primary | ICD-10-CM

## 2019-12-12 RX ORDER — DEXAMETHASONE 4 MG/1
TABLET ORAL
Qty: 8 TABLET | Refills: 6 | Status: SHIPPED | OUTPATIENT
Start: 2019-12-12 | End: 2020-04-21

## 2019-12-31 ENCOUNTER — LAB VISIT (OUTPATIENT)
Dept: LAB | Facility: HOSPITAL | Age: 64
End: 2019-12-31
Attending: OBSTETRICS & GYNECOLOGY
Payer: COMMERCIAL

## 2019-12-31 DIAGNOSIS — C57.01 FALLOPIAN TUBE CANCER, CARCINOMA, RIGHT: ICD-10-CM

## 2019-12-31 LAB
ANION GAP SERPL CALC-SCNC: 10 MMOL/L (ref 8–16)
BUN SERPL-MCNC: 13 MG/DL (ref 8–23)
CALCIUM SERPL-MCNC: 9.9 MG/DL (ref 8.7–10.5)
CHLORIDE SERPL-SCNC: 103 MMOL/L (ref 95–110)
CO2 SERPL-SCNC: 26 MMOL/L (ref 23–29)
CREAT SERPL-MCNC: 1.1 MG/DL (ref 0.5–1.4)
ERYTHROCYTE [DISTWIDTH] IN BLOOD BY AUTOMATED COUNT: 14.6 % (ref 11.5–14.5)
EST. GFR  (AFRICAN AMERICAN): >60 ML/MIN/1.73 M^2
EST. GFR  (NON AFRICAN AMERICAN): 54 ML/MIN/1.73 M^2
GLUCOSE SERPL-MCNC: 94 MG/DL (ref 70–110)
HCT VFR BLD AUTO: 35.6 % (ref 37–48.5)
HGB BLD-MCNC: 11.7 G/DL (ref 12–16)
IMM GRANULOCYTES # BLD AUTO: 0.01 K/UL (ref 0–0.04)
MCH RBC QN AUTO: 29.5 PG (ref 27–31)
MCHC RBC AUTO-ENTMCNC: 32.9 G/DL (ref 32–36)
MCV RBC AUTO: 90 FL (ref 82–98)
NEUTROPHILS # BLD AUTO: 1.3 K/UL (ref 1.8–7.7)
PLATELET # BLD AUTO: 128 K/UL (ref 150–350)
PMV BLD AUTO: 9.6 FL (ref 9.2–12.9)
POTASSIUM SERPL-SCNC: 3.6 MMOL/L (ref 3.5–5.1)
RBC # BLD AUTO: 3.96 M/UL (ref 4–5.4)
SODIUM SERPL-SCNC: 139 MMOL/L (ref 136–145)
WBC # BLD AUTO: 4.28 K/UL (ref 3.9–12.7)

## 2019-12-31 PROCEDURE — 85027 COMPLETE CBC AUTOMATED: CPT

## 2019-12-31 PROCEDURE — 80048 BASIC METABOLIC PNL TOTAL CA: CPT

## 2019-12-31 PROCEDURE — 36415 COLL VENOUS BLD VENIPUNCTURE: CPT

## 2020-01-02 ENCOUNTER — OFFICE VISIT (OUTPATIENT)
Dept: GYNECOLOGIC ONCOLOGY | Facility: CLINIC | Age: 65
End: 2020-01-02
Payer: COMMERCIAL

## 2020-01-02 ENCOUNTER — INFUSION (OUTPATIENT)
Dept: INFUSION THERAPY | Facility: HOSPITAL | Age: 65
End: 2020-01-02
Attending: OBSTETRICS & GYNECOLOGY
Payer: COMMERCIAL

## 2020-01-02 ENCOUNTER — LAB VISIT (OUTPATIENT)
Dept: LAB | Facility: HOSPITAL | Age: 65
End: 2020-01-02
Attending: OBSTETRICS & GYNECOLOGY
Payer: COMMERCIAL

## 2020-01-02 VITALS — RESPIRATION RATE: 18 BRPM | SYSTOLIC BLOOD PRESSURE: 109 MMHG | HEART RATE: 77 BPM | DIASTOLIC BLOOD PRESSURE: 74 MMHG

## 2020-01-02 VITALS
WEIGHT: 121.06 LBS | SYSTOLIC BLOOD PRESSURE: 100 MMHG | HEART RATE: 82 BPM | DIASTOLIC BLOOD PRESSURE: 64 MMHG | BODY MASS INDEX: 20.67 KG/M2 | HEIGHT: 64 IN

## 2020-01-02 DIAGNOSIS — C57.01 FALLOPIAN TUBE CANCER, CARCINOMA, RIGHT: ICD-10-CM

## 2020-01-02 DIAGNOSIS — Z51.11 CHEMOTHERAPY MANAGEMENT, ENCOUNTER FOR: ICD-10-CM

## 2020-01-02 DIAGNOSIS — C57.01 FALLOPIAN TUBE CANCER, CARCINOMA, RIGHT: Primary | ICD-10-CM

## 2020-01-02 LAB
ERYTHROCYTE [DISTWIDTH] IN BLOOD BY AUTOMATED COUNT: 15.2 % (ref 11.5–14.5)
HCT VFR BLD AUTO: 31.8 % (ref 37–48.5)
HGB BLD-MCNC: 10.2 G/DL (ref 12–16)
IMM GRANULOCYTES # BLD AUTO: 0.01 K/UL (ref 0–0.04)
MCH RBC QN AUTO: 29.1 PG (ref 27–31)
MCHC RBC AUTO-ENTMCNC: 32.1 G/DL (ref 32–36)
MCV RBC AUTO: 91 FL (ref 82–98)
NEUTROPHILS # BLD AUTO: 3.4 K/UL (ref 1.8–7.7)
PLATELET # BLD AUTO: 152 K/UL (ref 150–350)
PMV BLD AUTO: 9.8 FL (ref 9.2–12.9)
RBC # BLD AUTO: 3.5 M/UL (ref 4–5.4)
WBC # BLD AUTO: 6.65 K/UL (ref 3.9–12.7)

## 2020-01-02 PROCEDURE — 99999 PR PBB SHADOW E&M-EST. PATIENT-LVL III: CPT | Mod: PBBFAC,,, | Performed by: OBSTETRICS & GYNECOLOGY

## 2020-01-02 PROCEDURE — 96413 CHEMO IV INFUSION 1 HR: CPT

## 2020-01-02 PROCEDURE — 96417 CHEMO IV INFUS EACH ADDL SEQ: CPT

## 2020-01-02 PROCEDURE — 96375 TX/PRO/DX INJ NEW DRUG ADDON: CPT

## 2020-01-02 PROCEDURE — S0028 INJECTION, FAMOTIDINE, 20 MG: HCPCS | Performed by: OBSTETRICS & GYNECOLOGY

## 2020-01-02 PROCEDURE — 96377 APPLICATON ON-BODY INJECTOR: CPT

## 2020-01-02 PROCEDURE — 63600175 PHARM REV CODE 636 W HCPCS: Mod: JG | Performed by: OBSTETRICS & GYNECOLOGY

## 2020-01-02 PROCEDURE — 25000003 PHARM REV CODE 250: Performed by: OBSTETRICS & GYNECOLOGY

## 2020-01-02 PROCEDURE — 85027 COMPLETE CBC AUTOMATED: CPT

## 2020-01-02 PROCEDURE — 36415 COLL VENOUS BLD VENIPUNCTURE: CPT

## 2020-01-02 PROCEDURE — 96367 TX/PROPH/DG ADDL SEQ IV INF: CPT

## 2020-01-02 PROCEDURE — 3008F BODY MASS INDEX DOCD: CPT | Mod: CPTII,S$GLB,, | Performed by: OBSTETRICS & GYNECOLOGY

## 2020-01-02 PROCEDURE — 99999 PR PBB SHADOW E&M-EST. PATIENT-LVL III: ICD-10-PCS | Mod: PBBFAC,,, | Performed by: OBSTETRICS & GYNECOLOGY

## 2020-01-02 PROCEDURE — 3008F PR BODY MASS INDEX (BMI) DOCUMENTED: ICD-10-PCS | Mod: CPTII,S$GLB,, | Performed by: OBSTETRICS & GYNECOLOGY

## 2020-01-02 PROCEDURE — 99214 PR OFFICE/OUTPT VISIT, EST, LEVL IV, 30-39 MIN: ICD-10-PCS | Mod: 24,S$GLB,, | Performed by: OBSTETRICS & GYNECOLOGY

## 2020-01-02 PROCEDURE — 99214 OFFICE O/P EST MOD 30 MIN: CPT | Mod: 24,S$GLB,, | Performed by: OBSTETRICS & GYNECOLOGY

## 2020-01-02 PROCEDURE — 96361 HYDRATE IV INFUSION ADD-ON: CPT

## 2020-01-02 RX ORDER — HEPARIN 100 UNIT/ML
500 SYRINGE INTRAVENOUS
Status: CANCELLED | OUTPATIENT
Start: 2020-01-02

## 2020-01-02 RX ORDER — FAMOTIDINE 10 MG/ML
20 INJECTION INTRAVENOUS
Status: COMPLETED | OUTPATIENT
Start: 2020-01-02 | End: 2020-01-02

## 2020-01-02 RX ORDER — SODIUM CHLORIDE 0.9 % (FLUSH) 0.9 %
10 SYRINGE (ML) INJECTION
Status: CANCELLED | OUTPATIENT
Start: 2020-01-02

## 2020-01-02 RX ORDER — HEPARIN 100 UNIT/ML
500 SYRINGE INTRAVENOUS
Status: DISCONTINUED | OUTPATIENT
Start: 2020-01-02 | End: 2020-01-02 | Stop reason: HOSPADM

## 2020-01-02 RX ORDER — SODIUM CHLORIDE 0.9 % (FLUSH) 0.9 %
10 SYRINGE (ML) INJECTION
Status: DISCONTINUED | OUTPATIENT
Start: 2020-01-02 | End: 2020-01-02 | Stop reason: HOSPADM

## 2020-01-02 RX ORDER — FAMOTIDINE 10 MG/ML
20 INJECTION INTRAVENOUS
Status: CANCELLED | OUTPATIENT
Start: 2020-01-02

## 2020-01-02 RX ADMIN — PEGFILGRASTIM 6 MG: KIT SUBCUTANEOUS at 01:01

## 2020-01-02 RX ADMIN — CARBOPLATIN 360 MG: 10 INJECTION, SOLUTION INTRAVENOUS at 12:01

## 2020-01-02 RX ADMIN — FAMOTIDINE 20 MG: 10 INJECTION, SOLUTION INTRAVENOUS at 10:01

## 2020-01-02 RX ADMIN — SODIUM CHLORIDE 500 ML: 0.9 INJECTION, SOLUTION INTRAVENOUS at 01:01

## 2020-01-02 RX ADMIN — APREPITANT 130 MG: 130 INJECTION, EMULSION INTRAVENOUS at 10:01

## 2020-01-02 RX ADMIN — DIPHENHYDRAMINE HYDROCHLORIDE 50 MG: 50 INJECTION, SOLUTION INTRAMUSCULAR; INTRAVENOUS at 10:01

## 2020-01-02 RX ADMIN — DOCETAXEL ANHYDROUS 120 MG: 10 INJECTION, SOLUTION INTRAVENOUS at 11:01

## 2020-01-02 RX ADMIN — PALONOSETRON HYDROCHLORIDE: 0.25 INJECTION INTRAVENOUS at 10:01

## 2020-01-02 NOTE — PROGRESS NOTES
"Subjective:       Patient ID: Liz Coto is a 64 y.o. female.    Chief Complaint: fallopian tube cancer, carcinoma right    HPI   Patient comes in today for cycle 2 of taxotere and carboplatin for  FIGO stage [id number] grade 3 right fallopian tube adenocarcinoma.     Allergic reaction with taxol with cycle 1. Changed to taxotere for cycle 2.     Chemotherapy labs have been reviewed and are appropriate for treatment. Plans to return to work Jan 6th. She has no complaints today.    Her oncologic history is:  Nov 2019: HOLLY/BSO/small bowel resection/resection of PA sherry mass and omentectomy. Final pathology showed FIGO stage CHDD7et grade 3 right fallopian tube adenocarcinoma with retroperitoneal nodes. 2 omental nodes also found. No gross omental involvement. No residual disease.      Preop  10/2019 was 1097    Dec 2019: started taxol and carboplatin. Allergic reaction to taxol.  is 20.  Jan 2020: changed to taxotere and carboplatin.       Review of Systems   Constitutional: Negative for chills, fatigue and fever.   Respiratory: Negative for cough, shortness of breath and wheezing.    Cardiovascular: Negative for chest pain, palpitations and leg swelling.   Gastrointestinal: Negative for abdominal pain, constipation, diarrhea, nausea and vomiting.   Genitourinary: Negative for difficulty urinating, dysuria, frequency, genital sores, hematuria, urgency, vaginal bleeding, vaginal discharge and vaginal pain.   Musculoskeletal: Negative for gait problem.   Neurological: Negative for weakness and numbness.   Hematological: Negative for adenopathy. Does not bruise/bleed easily.   Psychiatric/Behavioral: The patient is not nervous/anxious.        Objective:   /64   Pulse 82   Ht 5' 4" (1.626 m)   Wt 54.9 kg (121 lb 0.5 oz)   LMP  (LMP Unknown)   BMI 20.78 kg/m²      Physical Exam   Constitutional: She is oriented to person, place, and time. She appears well-developed and well-nourished. No " distress.   HENT:   Head: Normocephalic.   Eyes: No scleral icterus.   Neck: Normal range of motion.   Cardiovascular: Normal rate and regular rhythm.   No murmur heard.  Pulmonary/Chest: Effort normal and breath sounds normal. No respiratory distress. She has no wheezes. She has no rales.   Abdominal: Soft. She exhibits no distension.   Genitourinary:   Genitourinary Comments: Pelvic exam deferred   Musculoskeletal: Normal range of motion. She exhibits no edema.   Neurological: She is alert and oriented to person, place, and time.   Skin: Skin is warm and dry. No rash noted. She is not diaphoretic. No pallor.   Psychiatric: She has a normal mood and affect. Her behavior is normal.   Nursing note and vitals reviewed.      Assessment:       1. Fallopian tube cancer, carcinoma, right    2. Chemotherapy management, encounter for        Plan:   Fallopian tube cancer, carcinoma, right  -     BRCAnalysis w/ myRISK; Future; Expected date: 01/02/2020  -     Basic metabolic panel; Future; Expected date: 01/02/2020  -     CBC Oncology; Standing    Chemotherapy management, encounter for    ok to proceed with C2 of chemotherapy  The risks, benefits, and indications of chemotherapy were explained to the patient including alopecia, N/V, BM suppression, fatigue, need for transfusion and peripheral neuropathy.  The patient voiced understanding, all questions were answered and consents were signed for taxotere.  Start Claritin daily  RTC as scheduled

## 2020-01-02 NOTE — PLAN OF CARE
Pt received Taxotere and carbo; tolerated well. VSS and NAD. Pt instructed to call MD with any concerns. Pt discharged home independently.

## 2020-01-08 ENCOUNTER — TELEPHONE (OUTPATIENT)
Dept: GYNECOLOGIC ONCOLOGY | Facility: CLINIC | Age: 65
End: 2020-01-08

## 2020-01-08 DIAGNOSIS — R11.0 CHEMOTHERAPY-INDUCED NAUSEA: Primary | ICD-10-CM

## 2020-01-08 DIAGNOSIS — T45.1X5A CHEMOTHERAPY-INDUCED NAUSEA: Primary | ICD-10-CM

## 2020-01-08 RX ORDER — PROMETHAZINE HYDROCHLORIDE 25 MG/1
25 TABLET ORAL EVERY 6 HOURS PRN
Qty: 20 TABLET | Refills: 1 | Status: SHIPPED | OUTPATIENT
Start: 2020-01-08 | End: 2020-03-04

## 2020-01-08 NOTE — TELEPHONE ENCOUNTER
Spoke with pt. States that she went back to work Monday. She states she has been having heart palpitations. States she has been eating and drinking but can't taste anything. Pt states that she is short of breath when she walks. Pt states this started Monday at work. She states she can't sit down or lay down for too long or her body hurts. Pt has not been taking her pain medication because she has been working. Pt was wondering if she should work half days since she hasn't been to work in 2 months. Informed pt that I would send this message to Dr Shukla for review. Pt denies any other needs at this time.    ----- Message from Sally Ochoa sent at 1/8/2020  8:13 AM CST -----  Contact: Pt    Name of Who is Calling:CHANEL RICH [[id number]]    What is the request in detail: Patient would like a call back regarding being in a lot of pain and  She is not able to eat much Please contact to further discuss and advise    Can the clinic reply by [facility]: no    What Number to Call Back if not in [facility]: [phone]

## 2020-01-13 ENCOUNTER — TELEPHONE (OUTPATIENT)
Dept: GYNECOLOGIC ONCOLOGY | Facility: CLINIC | Age: 65
End: 2020-01-13

## 2020-01-13 NOTE — TELEPHONE ENCOUNTER
----- Message from Iesha Polk sent at 1/13/2020 12:55 PM CST -----  Contact: CHANEL RICH [[id number]]  Name of Who is Calling: CHANEL RICH [[id number]]      What is the request in detail: Pt is calling to speak to staff in regards to having shortness of breathe and rapid heart beating. Pt has been exp. these sym since chemo   .... Please call to further assist .       Can the clinic reply by [facility]: Y        What Number to Call Back if not in [facility]: [phone]

## 2020-01-13 NOTE — TELEPHONE ENCOUNTER
I called  to triage call and pt  is feeling a lot better with no complaints. Pt states she was SOB on exertion last week. Advised pt to cont to watch. If s/sx return call office back or go to ER. JUSTIN/MA

## 2020-01-20 ENCOUNTER — TELEPHONE (OUTPATIENT)
Dept: GYNECOLOGIC ONCOLOGY | Facility: CLINIC | Age: 65
End: 2020-01-20

## 2020-01-20 ENCOUNTER — LAB VISIT (OUTPATIENT)
Dept: LAB | Facility: HOSPITAL | Age: 65
End: 2020-01-20
Attending: OBSTETRICS & GYNECOLOGY
Payer: COMMERCIAL

## 2020-01-20 DIAGNOSIS — C57.01 FALLOPIAN TUBE CANCER, CARCINOMA, RIGHT: ICD-10-CM

## 2020-01-20 LAB
ANION GAP SERPL CALC-SCNC: 7 MMOL/L (ref 8–16)
BUN SERPL-MCNC: 11 MG/DL (ref 8–23)
CALCIUM SERPL-MCNC: 9.2 MG/DL (ref 8.7–10.5)
CHLORIDE SERPL-SCNC: 107 MMOL/L (ref 95–110)
CO2 SERPL-SCNC: 29 MMOL/L (ref 23–29)
CREAT SERPL-MCNC: 1 MG/DL (ref 0.5–1.4)
ERYTHROCYTE [DISTWIDTH] IN BLOOD BY AUTOMATED COUNT: 16.3 % (ref 11.5–14.5)
EST. GFR  (AFRICAN AMERICAN): >60 ML/MIN/1.73 M^2
EST. GFR  (NON AFRICAN AMERICAN): >60 ML/MIN/1.73 M^2
GLUCOSE SERPL-MCNC: 103 MG/DL (ref 70–110)
HCT VFR BLD AUTO: 29.5 % (ref 37–48.5)
HGB BLD-MCNC: 9.5 G/DL (ref 12–16)
IMM GRANULOCYTES # BLD AUTO: 0.07 K/UL (ref 0–0.04)
MCH RBC QN AUTO: 30.2 PG (ref 27–31)
MCHC RBC AUTO-ENTMCNC: 32.2 G/DL (ref 32–36)
MCV RBC AUTO: 94 FL (ref 82–98)
NEUTROPHILS # BLD AUTO: 2.3 K/UL (ref 1.8–7.7)
PLATELET # BLD AUTO: 292 K/UL (ref 150–350)
PMV BLD AUTO: 8.9 FL (ref 9.2–12.9)
POTASSIUM SERPL-SCNC: 3.8 MMOL/L (ref 3.5–5.1)
RBC # BLD AUTO: 3.15 M/UL (ref 4–5.4)
SODIUM SERPL-SCNC: 143 MMOL/L (ref 136–145)
WBC # BLD AUTO: 7 K/UL (ref 3.9–12.7)

## 2020-01-20 PROCEDURE — 80048 BASIC METABOLIC PNL TOTAL CA: CPT

## 2020-01-20 PROCEDURE — 85027 COMPLETE CBC AUTOMATED: CPT

## 2020-01-20 PROCEDURE — 36415 COLL VENOUS BLD VENIPUNCTURE: CPT

## 2020-01-20 NOTE — TELEPHONE ENCOUNTER
----- Message from Christiana Perez sent at 1/20/2020  1:38 PM CST -----  Contact: pt  Name of Who is Calling: Liz Coto    What is the request in detail: pt would like to know if she would need to take thedexAMETHasone (DECADRON) 4 MG Tab before her appointment on the 1/22/19. Please contact to further discuss and advise.       Can the clinic reply by [facility]: n      What Number to [facility] Back [facility] not [facility]: [phone]

## 2020-01-20 NOTE — PROGRESS NOTES
"Subjective:       Patient ID: Liz Coto is a 64 y.o. female.    Chief Complaint: fallopian tube cancer, carcinoma, right    HPI     Patient comes in today for cycle 3 of taxotere and carboplatin for  FIGO stage [id number] grade 3 right fallopian tube adenocarcinoma.      Allergic reaction with taxol with cycle 1. Changed to taxotere for cycle 2. No problems with cycle 2.      Chemotherapy labs have been reviewed and are appropriate for treatment.      Her oncologic history is:  Nov 2019: HOLLY/BSO/small bowel resection/resection of PA sherry mass and omentectomy. Final pathology showed FIGO stage CSSI5sp grade 3 right fallopian tube adenocarcinoma with retroperitoneal nodes. 2 omental nodes also found. No gross omental involvement. No residual disease.      Preop  10/2019 was 1097     Dec 2019: started taxol and carboplatin. Allergic reaction to taxol.  is 20.  Jan 2020: changed to taxotere and carboplatin.      Review of Systems   Constitutional: Negative for chills, fatigue and fever.   Respiratory: Negative for cough, shortness of breath and wheezing.    Cardiovascular: Negative for chest pain, palpitations and leg swelling.   Gastrointestinal: Negative for abdominal pain, constipation, diarrhea, nausea and vomiting.   Genitourinary: Negative for difficulty urinating, dysuria, frequency, genital sores, hematuria, urgency, vaginal bleeding, vaginal discharge and vaginal pain.   Musculoskeletal: Negative for gait problem.   Neurological: Negative for weakness and numbness.   Hematological: Negative for adenopathy. Does not bruise/bleed easily.   Psychiatric/Behavioral: The patient is not nervous/anxious.        Objective:   /80   Pulse 83   Ht 5' 4" (1.626 m)   Wt 55.6 kg (122 lb 9.2 oz)   LMP  (LMP Unknown)   BMI 21.04 kg/m²      Physical Exam   Constitutional: She is oriented to person, place, and time. She appears well-developed and well-nourished.   HENT:   Head: Normocephalic and " atraumatic.   Eyes: No scleral icterus.   Neck: No tracheal deviation present. No thyromegaly present.   Cardiovascular: Normal rate and regular rhythm.   Pulmonary/Chest: Effort normal and breath sounds normal.   Abdominal: Soft. She exhibits no distension and no mass. There is no tenderness. There is no rebound and no guarding. No hernia.   Genitourinary:   Genitourinary Comments: Not performed.    Musculoskeletal: She exhibits no edema or tenderness.   Lymphadenopathy:     She has no cervical adenopathy.   Neurological: She is alert and oriented to person, place, and time.   Skin: Skin is warm and dry.   Psychiatric: She has a normal mood and affect. Her behavior is normal. Judgment and thought content normal.       Assessment:       1. Fallopian tube cancer, carcinoma, right    2. Chemotherapy management, encounter for        Plan:   Fallopian tube cancer, carcinoma, right  Proceed with cycle 3  RTC in 3 weeks   Chemotherapy management, encounter for

## 2020-01-21 ENCOUNTER — TELEPHONE (OUTPATIENT)
Dept: GYNECOLOGIC ONCOLOGY | Facility: CLINIC | Age: 65
End: 2020-01-21

## 2020-01-21 NOTE — TELEPHONE ENCOUNTER
----- Message from Larisa Hedrick sent at 1/21/2020 12:44 PM CST -----  Contact: CHANEL RICH   Name of Who is Calling: CHANEL RICH       What is the request in detail: Patient is requesting a call back regarding her chemotherapy appoinment. Please contact to further advise.      Can the clinic reply by [facility]: NO      [facility]: [phone]

## 2020-01-22 ENCOUNTER — OFFICE VISIT (OUTPATIENT)
Dept: GYNECOLOGIC ONCOLOGY | Facility: CLINIC | Age: 65
End: 2020-01-22
Payer: COMMERCIAL

## 2020-01-22 ENCOUNTER — INFUSION (OUTPATIENT)
Dept: INFUSION THERAPY | Facility: HOSPITAL | Age: 65
End: 2020-01-22
Attending: OBSTETRICS & GYNECOLOGY
Payer: COMMERCIAL

## 2020-01-22 VITALS
WEIGHT: 122.56 LBS | DIASTOLIC BLOOD PRESSURE: 80 MMHG | SYSTOLIC BLOOD PRESSURE: 122 MMHG | HEART RATE: 83 BPM | BODY MASS INDEX: 20.92 KG/M2 | HEIGHT: 64 IN

## 2020-01-22 VITALS
DIASTOLIC BLOOD PRESSURE: 64 MMHG | SYSTOLIC BLOOD PRESSURE: 101 MMHG | TEMPERATURE: 99 F | HEART RATE: 73 BPM | RESPIRATION RATE: 18 BRPM

## 2020-01-22 DIAGNOSIS — Z51.11 CHEMOTHERAPY MANAGEMENT, ENCOUNTER FOR: ICD-10-CM

## 2020-01-22 DIAGNOSIS — C57.01 FALLOPIAN TUBE CANCER, CARCINOMA, RIGHT: Primary | ICD-10-CM

## 2020-01-22 PROCEDURE — 96367 TX/PROPH/DG ADDL SEQ IV INF: CPT

## 2020-01-22 PROCEDURE — 99999 PR PBB SHADOW E&M-EST. PATIENT-LVL III: ICD-10-PCS | Mod: PBBFAC,,, | Performed by: OBSTETRICS & GYNECOLOGY

## 2020-01-22 PROCEDURE — S0028 INJECTION, FAMOTIDINE, 20 MG: HCPCS | Performed by: OBSTETRICS & GYNECOLOGY

## 2020-01-22 PROCEDURE — 99999 PR PBB SHADOW E&M-EST. PATIENT-LVL III: CPT | Mod: PBBFAC,,, | Performed by: OBSTETRICS & GYNECOLOGY

## 2020-01-22 PROCEDURE — 99214 PR OFFICE/OUTPT VISIT, EST, LEVL IV, 30-39 MIN: ICD-10-PCS | Mod: 24,S$GLB,, | Performed by: OBSTETRICS & GYNECOLOGY

## 2020-01-22 PROCEDURE — 96413 CHEMO IV INFUSION 1 HR: CPT

## 2020-01-22 PROCEDURE — 63600175 PHARM REV CODE 636 W HCPCS: Performed by: OBSTETRICS & GYNECOLOGY

## 2020-01-22 PROCEDURE — 96375 TX/PRO/DX INJ NEW DRUG ADDON: CPT

## 2020-01-22 PROCEDURE — 99214 OFFICE O/P EST MOD 30 MIN: CPT | Mod: 24,S$GLB,, | Performed by: OBSTETRICS & GYNECOLOGY

## 2020-01-22 PROCEDURE — 25000003 PHARM REV CODE 250: Performed by: OBSTETRICS & GYNECOLOGY

## 2020-01-22 PROCEDURE — 3008F BODY MASS INDEX DOCD: CPT | Mod: CPTII,S$GLB,, | Performed by: OBSTETRICS & GYNECOLOGY

## 2020-01-22 PROCEDURE — 96417 CHEMO IV INFUS EACH ADDL SEQ: CPT

## 2020-01-22 PROCEDURE — 96377 APPLICATON ON-BODY INJECTOR: CPT

## 2020-01-22 PROCEDURE — 96361 HYDRATE IV INFUSION ADD-ON: CPT

## 2020-01-22 PROCEDURE — 3008F PR BODY MASS INDEX (BMI) DOCUMENTED: ICD-10-PCS | Mod: CPTII,S$GLB,, | Performed by: OBSTETRICS & GYNECOLOGY

## 2020-01-22 RX ORDER — FAMOTIDINE 10 MG/ML
20 INJECTION INTRAVENOUS
Status: COMPLETED | OUTPATIENT
Start: 2020-01-22 | End: 2020-01-22

## 2020-01-22 RX ORDER — FAMOTIDINE 10 MG/ML
20 INJECTION INTRAVENOUS
Status: CANCELLED | OUTPATIENT
Start: 2020-01-22

## 2020-01-22 RX ORDER — SODIUM CHLORIDE 0.9 % (FLUSH) 0.9 %
10 SYRINGE (ML) INJECTION
Status: CANCELLED | OUTPATIENT
Start: 2020-01-22

## 2020-01-22 RX ORDER — HEPARIN 100 UNIT/ML
500 SYRINGE INTRAVENOUS
Status: CANCELLED | OUTPATIENT
Start: 2020-01-22

## 2020-01-22 RX ORDER — HEPARIN 100 UNIT/ML
500 SYRINGE INTRAVENOUS
Status: DISCONTINUED | OUTPATIENT
Start: 2020-01-22 | End: 2020-01-22 | Stop reason: HOSPADM

## 2020-01-22 RX ORDER — SODIUM CHLORIDE 0.9 % (FLUSH) 0.9 %
10 SYRINGE (ML) INJECTION
Status: DISCONTINUED | OUTPATIENT
Start: 2020-01-22 | End: 2020-01-22 | Stop reason: HOSPADM

## 2020-01-22 RX ADMIN — CARBOPLATIN 380 MG: 10 INJECTION, SOLUTION INTRAVENOUS at 04:01

## 2020-01-22 RX ADMIN — DIPHENHYDRAMINE HYDROCHLORIDE 50 MG: 50 INJECTION INTRAMUSCULAR; INTRAVENOUS at 03:01

## 2020-01-22 RX ADMIN — DEXAMETHASONE SODIUM PHOSPHATE: 4 INJECTION, SOLUTION INTRA-ARTICULAR; INTRALESIONAL; INTRAMUSCULAR; INTRAVENOUS; SOFT TISSUE at 03:01

## 2020-01-22 RX ADMIN — FAMOTIDINE 20 MG: 10 INJECTION, SOLUTION INTRAVENOUS at 03:01

## 2020-01-22 RX ADMIN — PEGFILGRASTIM 6 MG: KIT SUBCUTANEOUS at 04:01

## 2020-01-22 RX ADMIN — APREPITANT 130 MG: 130 INJECTION, EMULSION INTRAVENOUS at 03:01

## 2020-01-22 RX ADMIN — DOCETAXEL ANHYDROUS 120 MG: 10 INJECTION, SOLUTION INTRAVENOUS at 03:01

## 2020-01-22 RX ADMIN — SODIUM CHLORIDE 500 ML: 0.9 INJECTION, SOLUTION INTRAVENOUS at 04:01

## 2020-01-30 ENCOUNTER — PATIENT MESSAGE (OUTPATIENT)
Dept: GYNECOLOGIC ONCOLOGY | Facility: CLINIC | Age: 65
End: 2020-01-30

## 2020-02-10 ENCOUNTER — LAB VISIT (OUTPATIENT)
Dept: LAB | Facility: HOSPITAL | Age: 65
End: 2020-02-10
Payer: COMMERCIAL

## 2020-02-10 DIAGNOSIS — C57.01 FALLOPIAN TUBE CANCER, CARCINOMA, RIGHT: ICD-10-CM

## 2020-02-10 LAB
ANION GAP SERPL CALC-SCNC: 6 MMOL/L (ref 8–16)
BUN SERPL-MCNC: 11 MG/DL (ref 8–23)
CALCIUM SERPL-MCNC: 8.9 MG/DL (ref 8.7–10.5)
CANCER AG125 SERPL-ACNC: 13 U/ML (ref 0–30)
CHLORIDE SERPL-SCNC: 104 MMOL/L (ref 95–110)
CO2 SERPL-SCNC: 28 MMOL/L (ref 23–29)
CREAT SERPL-MCNC: 0.9 MG/DL (ref 0.5–1.4)
ERYTHROCYTE [DISTWIDTH] IN BLOOD BY AUTOMATED COUNT: 19.7 % (ref 11.5–14.5)
EST. GFR  (AFRICAN AMERICAN): >60 ML/MIN/1.73 M^2
EST. GFR  (NON AFRICAN AMERICAN): >60 ML/MIN/1.73 M^2
GLUCOSE SERPL-MCNC: 79 MG/DL (ref 70–110)
HCT VFR BLD AUTO: 28.5 % (ref 37–48.5)
HGB BLD-MCNC: 8.9 G/DL (ref 12–16)
IMM GRANULOCYTES # BLD AUTO: 0.27 K/UL (ref 0–0.04)
MCH RBC QN AUTO: 29.8 PG (ref 27–31)
MCHC RBC AUTO-ENTMCNC: 31.2 G/DL (ref 32–36)
MCV RBC AUTO: 95 FL (ref 82–98)
NEUTROPHILS # BLD AUTO: 7 K/UL (ref 1.8–7.7)
PLATELET # BLD AUTO: 236 K/UL (ref 150–350)
PMV BLD AUTO: 9.1 FL (ref 9.2–12.9)
POTASSIUM SERPL-SCNC: 3.9 MMOL/L (ref 3.5–5.1)
RBC # BLD AUTO: 2.99 M/UL (ref 4–5.4)
SODIUM SERPL-SCNC: 138 MMOL/L (ref 136–145)
WBC # BLD AUTO: 12.45 K/UL (ref 3.9–12.7)

## 2020-02-10 PROCEDURE — 86304 IMMUNOASSAY TUMOR CA 125: CPT

## 2020-02-10 PROCEDURE — 36415 COLL VENOUS BLD VENIPUNCTURE: CPT

## 2020-02-10 PROCEDURE — 85027 COMPLETE CBC AUTOMATED: CPT

## 2020-02-10 PROCEDURE — 80048 BASIC METABOLIC PNL TOTAL CA: CPT

## 2020-02-12 ENCOUNTER — INFUSION (OUTPATIENT)
Dept: INFUSION THERAPY | Facility: HOSPITAL | Age: 65
End: 2020-02-12
Attending: OBSTETRICS & GYNECOLOGY
Payer: COMMERCIAL

## 2020-02-12 ENCOUNTER — OFFICE VISIT (OUTPATIENT)
Dept: GYNECOLOGIC ONCOLOGY | Facility: CLINIC | Age: 65
End: 2020-02-12
Payer: COMMERCIAL

## 2020-02-12 VITALS
HEART RATE: 84 BPM | BODY MASS INDEX: 21.07 KG/M2 | DIASTOLIC BLOOD PRESSURE: 56 MMHG | HEIGHT: 64 IN | SYSTOLIC BLOOD PRESSURE: 93 MMHG | RESPIRATION RATE: 16 BRPM | WEIGHT: 123.44 LBS | TEMPERATURE: 98 F

## 2020-02-12 VITALS
HEIGHT: 64 IN | HEART RATE: 95 BPM | SYSTOLIC BLOOD PRESSURE: 102 MMHG | WEIGHT: 123.44 LBS | DIASTOLIC BLOOD PRESSURE: 66 MMHG | BODY MASS INDEX: 21.07 KG/M2

## 2020-02-12 DIAGNOSIS — Z51.11 CHEMOTHERAPY MANAGEMENT, ENCOUNTER FOR: ICD-10-CM

## 2020-02-12 DIAGNOSIS — C57.01 FALLOPIAN TUBE CANCER, CARCINOMA, RIGHT: Primary | ICD-10-CM

## 2020-02-12 DIAGNOSIS — R11.2 CHEMOTHERAPY INDUCED NAUSEA AND VOMITING: ICD-10-CM

## 2020-02-12 DIAGNOSIS — T45.1X5A CHEMOTHERAPY INDUCED NAUSEA AND VOMITING: ICD-10-CM

## 2020-02-12 PROCEDURE — 99214 PR OFFICE/OUTPT VISIT, EST, LEVL IV, 30-39 MIN: ICD-10-PCS | Mod: S$GLB,,, | Performed by: OBSTETRICS & GYNECOLOGY

## 2020-02-12 PROCEDURE — 25000003 PHARM REV CODE 250: Performed by: OBSTETRICS & GYNECOLOGY

## 2020-02-12 PROCEDURE — 99214 OFFICE O/P EST MOD 30 MIN: CPT | Mod: S$GLB,,, | Performed by: OBSTETRICS & GYNECOLOGY

## 2020-02-12 PROCEDURE — 63600175 PHARM REV CODE 636 W HCPCS: Performed by: OBSTETRICS & GYNECOLOGY

## 2020-02-12 PROCEDURE — 99999 PR PBB SHADOW E&M-EST. PATIENT-LVL III: CPT | Mod: PBBFAC,,, | Performed by: OBSTETRICS & GYNECOLOGY

## 2020-02-12 PROCEDURE — 96413 CHEMO IV INFUSION 1 HR: CPT

## 2020-02-12 PROCEDURE — 3008F PR BODY MASS INDEX (BMI) DOCUMENTED: ICD-10-PCS | Mod: CPTII,S$GLB,, | Performed by: OBSTETRICS & GYNECOLOGY

## 2020-02-12 PROCEDURE — S0028 INJECTION, FAMOTIDINE, 20 MG: HCPCS | Performed by: OBSTETRICS & GYNECOLOGY

## 2020-02-12 PROCEDURE — 96417 CHEMO IV INFUS EACH ADDL SEQ: CPT

## 2020-02-12 PROCEDURE — 96375 TX/PRO/DX INJ NEW DRUG ADDON: CPT

## 2020-02-12 PROCEDURE — 96361 HYDRATE IV INFUSION ADD-ON: CPT

## 2020-02-12 PROCEDURE — 99999 PR PBB SHADOW E&M-EST. PATIENT-LVL III: ICD-10-PCS | Mod: PBBFAC,,, | Performed by: OBSTETRICS & GYNECOLOGY

## 2020-02-12 PROCEDURE — 96367 TX/PROPH/DG ADDL SEQ IV INF: CPT

## 2020-02-12 PROCEDURE — 3008F BODY MASS INDEX DOCD: CPT | Mod: CPTII,S$GLB,, | Performed by: OBSTETRICS & GYNECOLOGY

## 2020-02-12 RX ORDER — SODIUM CHLORIDE 0.9 % (FLUSH) 0.9 %
10 SYRINGE (ML) INJECTION
Status: DISCONTINUED | OUTPATIENT
Start: 2020-02-12 | End: 2020-02-12 | Stop reason: HOSPADM

## 2020-02-12 RX ORDER — HEPARIN 100 UNIT/ML
500 SYRINGE INTRAVENOUS
Status: CANCELLED | OUTPATIENT
Start: 2020-02-12

## 2020-02-12 RX ORDER — FAMOTIDINE 10 MG/ML
20 INJECTION INTRAVENOUS
Status: CANCELLED | OUTPATIENT
Start: 2020-02-12

## 2020-02-12 RX ORDER — FAMOTIDINE 10 MG/ML
20 INJECTION INTRAVENOUS
Status: COMPLETED | OUTPATIENT
Start: 2020-02-12 | End: 2020-02-12

## 2020-02-12 RX ORDER — HEPARIN 100 UNIT/ML
500 SYRINGE INTRAVENOUS
Status: DISCONTINUED | OUTPATIENT
Start: 2020-02-12 | End: 2020-02-12 | Stop reason: HOSPADM

## 2020-02-12 RX ORDER — SODIUM CHLORIDE 0.9 % (FLUSH) 0.9 %
10 SYRINGE (ML) INJECTION
Status: CANCELLED | OUTPATIENT
Start: 2020-02-12

## 2020-02-12 RX ORDER — OLANZAPINE 2.5 MG/1
2.5 TABLET ORAL NIGHTLY
Qty: 30 TABLET | Refills: 11 | Status: SHIPPED | OUTPATIENT
Start: 2020-02-12 | End: 2020-04-21

## 2020-02-12 RX ADMIN — DIPHENHYDRAMINE HYDROCHLORIDE 50 MG: 50 INJECTION INTRAMUSCULAR; INTRAVENOUS at 10:02

## 2020-02-12 RX ADMIN — PALONOSETRON HYDROCHLORIDE: 0.25 INJECTION INTRAVENOUS at 11:02

## 2020-02-12 RX ADMIN — DOCETAXEL ANHYDROUS 120 MG: 10 INJECTION, SOLUTION INTRAVENOUS at 11:02

## 2020-02-12 RX ADMIN — CARBOPLATIN 410 MG: 10 INJECTION, SOLUTION INTRAVENOUS at 12:02

## 2020-02-12 RX ADMIN — APREPITANT 130 MG: 130 INJECTION, EMULSION INTRAVENOUS at 11:02

## 2020-02-12 RX ADMIN — FAMOTIDINE 20 MG: 10 INJECTION, SOLUTION INTRAVENOUS at 10:02

## 2020-02-12 RX ADMIN — SODIUM CHLORIDE 500 ML: 0.9 INJECTION, SOLUTION INTRAVENOUS at 01:02

## 2020-02-12 RX ADMIN — PEGFILGRASTIM 6 MG: KIT SUBCUTANEOUS at 01:02

## 2020-02-12 NOTE — PROGRESS NOTES
"Subjective:       Patient ID: Liz Coto is a 64 y.o. female.    Chief Complaint: fallopian tube cancer, carcinoma right    HPI   Patient comes in today for cycle 4 of taxotere and carboplatin for  FIGO stage EHMT0bs grade 3 right fallopian tube adenocarcinoma.      Allergic reaction with taxol with cycle 1. Changed to taxotere for cycle 2. No problems with cycle 2.     C3D7 had nausea and vomiting.     : 1097>20(before C1)>13(p C3).      Chemotherapy labs have been reviewed and are appropriate for treatment.      Her oncologic history is:  Nov 2019: HOLLY/BSO/small bowel resection/resection of PA sherry mass and omentectomy. Final pathology showed FIGO stage [id number] grade 3 right fallopian tube adenocarcinoma with retroperitoneal nodes. 2 omental nodes also found. No gross omental involvement. No residual disease.      Preop  10/2019 was 1097     Dec 2019: started taxol and carboplatin. Allergic reaction to taxol.  is 20.  Jan 2020: changed to taxotere and carboplatin.      Review of Systems   Constitutional: Negative for chills, fatigue and fever.   Respiratory: Negative for cough, shortness of breath and wheezing.    Cardiovascular: Negative for chest pain, palpitations and leg swelling.   Gastrointestinal: Negative for abdominal pain, constipation, diarrhea, nausea and vomiting.   Genitourinary: Negative for difficulty urinating, dysuria, frequency, genital sores, hematuria, urgency, vaginal bleeding, vaginal discharge and vaginal pain.   Musculoskeletal: Negative for gait problem.   Neurological: Negative for weakness and numbness.   Hematological: Negative for adenopathy. Does not bruise/bleed easily.   Psychiatric/Behavioral: The patient is not nervous/anxious.        Objective:   /66   Pulse 95   Ht 5' 4" (1.626 m)   Wt 56 kg (123 lb 7.3 oz)   LMP  (LMP Unknown)   BMI 21.19 kg/m²      Physical Exam   Constitutional: She is oriented to person, place, and time. She " appears well-developed and well-nourished.   HENT:   Head: Normocephalic and atraumatic.   Eyes: No scleral icterus.   Neck: No tracheal deviation present. No thyromegaly present.   Cardiovascular: Normal rate and regular rhythm.   Pulmonary/Chest: Effort normal and breath sounds normal.   Abdominal: Soft. She exhibits no distension and no mass. There is no tenderness. There is no rebound and no guarding. No hernia.   Genitourinary:   Genitourinary Comments: Not performed.    Musculoskeletal: She exhibits no edema or tenderness.   Lymphadenopathy:     She has no cervical adenopathy.   Neurological: She is alert and oriented to person, place, and time.   Skin: Skin is warm and dry.   Psychiatric: She has a normal mood and affect. Her behavior is normal. Judgment and thought content normal.       Assessment:       1. Fallopian tube cancer, carcinoma, right    2. Chemotherapy induced nausea and vomiting        Plan:   Fallopian tube cancer, carcinoma, right    Chemotherapy induced nausea and vomiting  -     OLANZapine (ZYPREXA) 2.5 MG tablet; Take 1 tablet (2.5 mg total) by mouth every evening.  Dispense: 30 tablet; Refill: 11      Proceed with cycle 4  RTC in 3 weeks.   Add olanzapine to help with nausea.

## 2020-02-12 NOTE — PLAN OF CARE
Pt tolerated taxotere/carbo/IVF  without complications. Neulasta OBI placed to abd with instructions given. VSS. No s/s of reaction. Instructed to contact MD with any questions. PIV  removed and AVS given to patient.

## 2020-02-18 ENCOUNTER — LAB VISIT (OUTPATIENT)
Dept: LAB | Facility: HOSPITAL | Age: 65
End: 2020-02-18
Attending: OBSTETRICS & GYNECOLOGY
Payer: COMMERCIAL

## 2020-02-18 ENCOUNTER — PATIENT MESSAGE (OUTPATIENT)
Dept: GYNECOLOGIC ONCOLOGY | Facility: CLINIC | Age: 65
End: 2020-02-18

## 2020-02-18 DIAGNOSIS — C57.01 FALLOPIAN TUBE CANCER, CARCINOMA, RIGHT: ICD-10-CM

## 2020-02-18 DIAGNOSIS — C57.01 FALLOPIAN TUBE CANCER, CARCINOMA, RIGHT: Primary | ICD-10-CM

## 2020-02-18 LAB
ERYTHROCYTE [DISTWIDTH] IN BLOOD BY AUTOMATED COUNT: 18.9 % (ref 11.5–14.5)
HCT VFR BLD AUTO: 28.8 % (ref 37–48.5)
HGB BLD-MCNC: 9.3 G/DL (ref 12–16)
IMM GRANULOCYTES # BLD AUTO: 0.01 K/UL (ref 0–0.04)
MCH RBC QN AUTO: 30.5 PG (ref 27–31)
MCHC RBC AUTO-ENTMCNC: 32.3 G/DL (ref 32–36)
MCV RBC AUTO: 94 FL (ref 82–98)
NEUTROPHILS # BLD AUTO: 1.4 K/UL (ref 1.8–7.7)
PLATELET # BLD AUTO: 71 K/UL (ref 150–350)
PMV BLD AUTO: 11 FL (ref 9.2–12.9)
RBC # BLD AUTO: 3.05 M/UL (ref 4–5.4)
WBC # BLD AUTO: 3.28 K/UL (ref 3.9–12.7)

## 2020-02-18 PROCEDURE — 85027 COMPLETE CBC AUTOMATED: CPT

## 2020-02-18 PROCEDURE — 36415 COLL VENOUS BLD VENIPUNCTURE: CPT

## 2020-02-18 RX ORDER — OXYCODONE HYDROCHLORIDE 5 MG/1
5 TABLET ORAL EVERY 12 HOURS PRN
Qty: 15 TABLET | Refills: 0 | Status: SHIPPED | OUTPATIENT
Start: 2020-02-18 | End: 2020-07-22

## 2020-02-18 NOTE — TELEPHONE ENCOUNTER
----- Message from Pérez Shukla MD sent at 2/18/2020  3:18 PM CST -----  Please arrange of fluids for this patient. 2L NS. Thank you.

## 2020-02-19 ENCOUNTER — INFUSION (OUTPATIENT)
Dept: INFUSION THERAPY | Facility: HOSPITAL | Age: 65
End: 2020-02-19
Attending: OBSTETRICS & GYNECOLOGY
Payer: COMMERCIAL

## 2020-02-19 VITALS
HEART RATE: 95 BPM | SYSTOLIC BLOOD PRESSURE: 94 MMHG | TEMPERATURE: 98 F | DIASTOLIC BLOOD PRESSURE: 66 MMHG | RESPIRATION RATE: 16 BRPM

## 2020-02-19 DIAGNOSIS — C57.01 FALLOPIAN TUBE CANCER, CARCINOMA, RIGHT: Primary | ICD-10-CM

## 2020-02-19 PROCEDURE — 96360 HYDRATION IV INFUSION INIT: CPT

## 2020-02-19 PROCEDURE — 96361 HYDRATE IV INFUSION ADD-ON: CPT

## 2020-02-19 PROCEDURE — 63600175 PHARM REV CODE 636 W HCPCS: Performed by: OBSTETRICS & GYNECOLOGY

## 2020-02-19 RX ORDER — HEPARIN 100 UNIT/ML
500 SYRINGE INTRAVENOUS
Status: DISCONTINUED | OUTPATIENT
Start: 2020-02-19 | End: 2020-02-19 | Stop reason: HOSPADM

## 2020-02-19 RX ORDER — HEPARIN 100 UNIT/ML
500 SYRINGE INTRAVENOUS
Status: CANCELLED | OUTPATIENT
Start: 2020-02-19

## 2020-02-19 RX ORDER — SODIUM CHLORIDE 0.9 % (FLUSH) 0.9 %
10 SYRINGE (ML) INJECTION
Status: DISCONTINUED | OUTPATIENT
Start: 2020-02-19 | End: 2020-02-19 | Stop reason: HOSPADM

## 2020-02-19 RX ORDER — SODIUM CHLORIDE 0.9 % (FLUSH) 0.9 %
10 SYRINGE (ML) INJECTION
Status: CANCELLED | OUTPATIENT
Start: 2020-02-19

## 2020-02-19 RX ADMIN — SODIUM CHLORIDE 2000 ML: 0.9 INJECTION, SOLUTION INTRAVENOUS at 02:02

## 2020-02-19 NOTE — PLAN OF CARE
Patient received 2L of NS over 2 hours. VS stable. Labs reviewed prior to treatment. Patient reports trouble drinking at home d/t nausea and taste changes. PIV removed with catheter tip intact prior to discharge. AVS declined. Discharged home.

## 2020-03-02 ENCOUNTER — HOSPITAL ENCOUNTER (EMERGENCY)
Facility: HOSPITAL | Age: 65
Discharge: HOME | End: 2020-03-02
Attending: EMERGENCY MEDICINE
Payer: COMMERCIAL

## 2020-03-02 ENCOUNTER — LAB VISIT (OUTPATIENT)
Dept: LAB | Facility: HOSPITAL | Age: 65
End: 2020-03-02
Attending: OBSTETRICS & GYNECOLOGY
Payer: COMMERCIAL

## 2020-03-02 VITALS
HEIGHT: 64 IN | HEART RATE: 84 BPM | TEMPERATURE: 99 F | OXYGEN SATURATION: 100 % | DIASTOLIC BLOOD PRESSURE: 52 MMHG | RESPIRATION RATE: 16 BRPM | SYSTOLIC BLOOD PRESSURE: 93 MMHG | BODY MASS INDEX: 21 KG/M2 | WEIGHT: 123 LBS

## 2020-03-02 DIAGNOSIS — C57.01 FALLOPIAN TUBE CANCER, CARCINOMA, RIGHT: ICD-10-CM

## 2020-03-02 DIAGNOSIS — R60.0 BILATERAL LOWER EXTREMITY EDEMA: ICD-10-CM

## 2020-03-02 LAB
ALBUMIN SERPL BCP-MCNC: 3.1 G/DL (ref 3.5–5.2)
ALP SERPL-CCNC: 87 U/L (ref 55–135)
ALT SERPL W/O P-5'-P-CCNC: 11 U/L (ref 10–44)
ANION GAP SERPL CALC-SCNC: 4 MMOL/L (ref 8–16)
ANION GAP SERPL CALC-SCNC: 7 MMOL/L (ref 8–16)
APTT BLDCRRT: 26.1 SEC (ref 21–32)
AST SERPL-CCNC: 18 U/L (ref 10–40)
BACTERIA #/AREA URNS HPF: ABNORMAL /HPF
BASOPHILS # BLD AUTO: 0.01 K/UL (ref 0–0.2)
BASOPHILS NFR BLD: 0.1 % (ref 0–1.9)
BILIRUB SERPL-MCNC: 0.2 MG/DL (ref 0.1–1)
BILIRUB UR QL STRIP: NEGATIVE
BNP SERPL-MCNC: 91 PG/ML (ref 0–99)
BUN SERPL-MCNC: 13 MG/DL (ref 8–23)
BUN SERPL-MCNC: 15 MG/DL (ref 8–23)
CALCIUM SERPL-MCNC: 8.6 MG/DL (ref 8.7–10.5)
CALCIUM SERPL-MCNC: 9 MG/DL (ref 8.7–10.5)
CHLORIDE SERPL-SCNC: 107 MMOL/L (ref 95–110)
CHLORIDE SERPL-SCNC: 107 MMOL/L (ref 95–110)
CLARITY UR: ABNORMAL
CO2 SERPL-SCNC: 27 MMOL/L (ref 23–29)
CO2 SERPL-SCNC: 29 MMOL/L (ref 23–29)
COLOR UR: YELLOW
CREAT SERPL-MCNC: 0.9 MG/DL (ref 0.5–1.4)
CREAT SERPL-MCNC: 1.1 MG/DL (ref 0.5–1.4)
DIFFERENTIAL METHOD: ABNORMAL
EOSINOPHIL # BLD AUTO: 0 K/UL (ref 0–0.5)
EOSINOPHIL NFR BLD: 0 % (ref 0–8)
ERYTHROCYTE [DISTWIDTH] IN BLOOD BY AUTOMATED COUNT: 22.5 % (ref 11.5–14.5)
ERYTHROCYTE [DISTWIDTH] IN BLOOD BY AUTOMATED COUNT: 22.7 % (ref 11.5–14.5)
EST. GFR  (AFRICAN AMERICAN): >60 ML/MIN/1.73 M^2
EST. GFR  (AFRICAN AMERICAN): >60 ML/MIN/1.73 M^2
EST. GFR  (NON AFRICAN AMERICAN): 54 ML/MIN/1.73 M^2
EST. GFR  (NON AFRICAN AMERICAN): >60 ML/MIN/1.73 M^2
GLUCOSE SERPL-MCNC: 101 MG/DL (ref 70–110)
GLUCOSE SERPL-MCNC: 56 MG/DL (ref 70–110)
GLUCOSE UR QL STRIP: NEGATIVE
HCT VFR BLD AUTO: 24.4 % (ref 37–48.5)
HCT VFR BLD AUTO: 25.3 % (ref 37–48.5)
HGB BLD-MCNC: 7.6 G/DL (ref 12–16)
HGB BLD-MCNC: 8.1 G/DL (ref 12–16)
HGB UR QL STRIP: ABNORMAL
IMM GRANULOCYTES # BLD AUTO: 0.04 K/UL (ref 0–0.04)
IMM GRANULOCYTES # BLD AUTO: 0.05 K/UL (ref 0–0.04)
IMM GRANULOCYTES NFR BLD AUTO: 0.6 % (ref 0–0.5)
INR PPP: 0.9 (ref 0.8–1.2)
KETONES UR QL STRIP: NEGATIVE
LEUKOCYTE ESTERASE UR QL STRIP: ABNORMAL
LYMPHOCYTES # BLD AUTO: 2.8 K/UL (ref 1–4.8)
LYMPHOCYTES NFR BLD: 31.5 % (ref 18–48)
MCH RBC QN AUTO: 31.4 PG (ref 27–31)
MCH RBC QN AUTO: 31.9 PG (ref 27–31)
MCHC RBC AUTO-ENTMCNC: 31.1 G/DL (ref 32–36)
MCHC RBC AUTO-ENTMCNC: 32 G/DL (ref 32–36)
MCV RBC AUTO: 100 FL (ref 82–98)
MCV RBC AUTO: 101 FL (ref 82–98)
MICROSCOPIC COMMENT: ABNORMAL
MONOCYTES # BLD AUTO: 0.7 K/UL (ref 0.3–1)
MONOCYTES NFR BLD: 8.1 % (ref 4–15)
NEUTROPHILS # BLD AUTO: 4.1 K/UL (ref 1.8–7.7)
NEUTROPHILS # BLD AUTO: 5.2 K/UL (ref 1.8–7.7)
NEUTROPHILS NFR BLD: 59.7 % (ref 38–73)
NITRITE UR QL STRIP: NEGATIVE
NRBC BLD-RTO: 0 /100 WBC
PH UR STRIP: 5 [PH] (ref 5–8)
PLATELET # BLD AUTO: 178 K/UL (ref 150–350)
PLATELET # BLD AUTO: 182 K/UL (ref 150–350)
PMV BLD AUTO: 9.5 FL (ref 9.2–12.9)
PMV BLD AUTO: 9.7 FL (ref 9.2–12.9)
POTASSIUM SERPL-SCNC: 4 MMOL/L (ref 3.5–5.1)
POTASSIUM SERPL-SCNC: 4 MMOL/L (ref 3.5–5.1)
PROT SERPL-MCNC: 5.9 G/DL (ref 6–8.4)
PROT UR QL STRIP: NEGATIVE
PROTHROMBIN TIME: 9.5 SEC (ref 9–12.5)
RBC # BLD AUTO: 2.42 M/UL (ref 4–5.4)
RBC # BLD AUTO: 2.54 M/UL (ref 4–5.4)
RBC #/AREA URNS HPF: 6 /HPF (ref 0–4)
SODIUM SERPL-SCNC: 140 MMOL/L (ref 136–145)
SODIUM SERPL-SCNC: 141 MMOL/L (ref 136–145)
SP GR UR STRIP: 1.02 (ref 1–1.03)
SQUAMOUS #/AREA URNS HPF: 4 /HPF
TSH SERPL DL<=0.005 MIU/L-ACNC: 2.4 UIU/ML (ref 0.4–4)
URN SPEC COLLECT METH UR: ABNORMAL
UROBILINOGEN UR STRIP-ACNC: NEGATIVE EU/DL
WBC # BLD AUTO: 7.73 K/UL (ref 3.9–12.7)
WBC # BLD AUTO: 8.75 K/UL (ref 3.9–12.7)
WBC #/AREA URNS HPF: 5 /HPF (ref 0–5)

## 2020-03-02 PROCEDURE — 85730 THROMBOPLASTIN TIME PARTIAL: CPT

## 2020-03-02 PROCEDURE — 80053 COMPREHEN METABOLIC PANEL: CPT

## 2020-03-02 PROCEDURE — 36415 COLL VENOUS BLD VENIPUNCTURE: CPT

## 2020-03-02 PROCEDURE — 85610 PROTHROMBIN TIME: CPT

## 2020-03-02 PROCEDURE — 85027 COMPLETE CBC AUTOMATED: CPT

## 2020-03-02 PROCEDURE — 80048 BASIC METABOLIC PNL TOTAL CA: CPT

## 2020-03-02 PROCEDURE — 85025 COMPLETE CBC W/AUTO DIFF WBC: CPT

## 2020-03-02 PROCEDURE — 84443 ASSAY THYROID STIM HORMONE: CPT

## 2020-03-02 PROCEDURE — 99284 EMERGENCY DEPT VISIT MOD MDM: CPT | Mod: 25

## 2020-03-02 PROCEDURE — 81000 URINALYSIS NONAUTO W/SCOPE: CPT

## 2020-03-02 PROCEDURE — 83880 ASSAY OF NATRIURETIC PEPTIDE: CPT

## 2020-03-03 DIAGNOSIS — D64.81 ANEMIA ASSOCIATED WITH CHEMOTHERAPY: Primary | ICD-10-CM

## 2020-03-03 DIAGNOSIS — T45.1X5A ANEMIA ASSOCIATED WITH CHEMOTHERAPY: Primary | ICD-10-CM

## 2020-03-03 RX ORDER — HYDROCODONE BITARTRATE AND ACETAMINOPHEN 500; 5 MG/1; MG/1
TABLET ORAL ONCE
Status: CANCELLED | OUTPATIENT
Start: 2020-03-03 | End: 2020-03-03

## 2020-03-03 RX ORDER — DIPHENHYDRAMINE HCL 25 MG
25 CAPSULE ORAL
Status: CANCELLED | OUTPATIENT
Start: 2020-03-03

## 2020-03-03 RX ORDER — ACETAMINOPHEN 325 MG/1
650 TABLET ORAL
Status: CANCELLED | OUTPATIENT
Start: 2020-03-03

## 2020-03-03 NOTE — ED TRIAGE NOTES
Per pt, she is currently on Chemotherapy (Taxol q3w) Discussed side effects of Taxol (fluid retention, hair loss, leucocytosis,etc). Pt implied understanding.

## 2020-03-03 NOTE — ED TRIAGE NOTES
Patient reports bilateral leg swelling that began today. Edema noted to feet, denies symptoms, denies shortness of breath, chest pain, fatigue, dizziness. Legs are not red.

## 2020-03-03 NOTE — PROGRESS NOTES
Subjective:       Patient ID: Liz Coto is a 64 y.o. female.    Chief Complaint: Follow-up and Chemotherapy    HPI     Patient comes in today for cycle 5 of taxotere and carboplatin for  FIGO stage [id number] grade 3 right fallopian tube adenocarcinoma.      Allergic reaction with taxol with cycle 1. Changed to taxotere for cycle 2. No problems with cycle 2.      C3D7 had nausea and vomiting.     Mar 2, 2020: in ED with BLE edema. Stanton CF doppler negative.      : 1097>20(before C1)>13(p C3).      Chemotherapy labs have been reviewed and are appropriate for treatment. H&H: 7.6/24.4     Her oncologic history is:  Nov 2019: HOLLY/BSO/small bowel resection/resection of PA sherry mass and omentectomy. Final pathology showed FIGO stage BSOY9cs grade 3 right fallopian tube adenocarcinoma with retroperitoneal nodes. 2 omental nodes also found. No gross omental involvement. No residual disease.      Preop  10/2019 was 1097     Dec 2019: started taxol and carboplatin. Allergic reaction to taxol.  is 20.  Jan 2020: changed to taxotere and carboplatin.      Review of Systems   Constitutional: Positive for fatigue. Negative for chills and fever.   Respiratory: Negative for cough, shortness of breath and wheezing.    Cardiovascular: Positive for leg swelling (BILATERAL LEGS. ). Negative for chest pain and palpitations.   Gastrointestinal: Negative for abdominal pain, constipation, diarrhea, nausea and vomiting.   Genitourinary: Negative for difficulty urinating, dysuria, frequency, genital sores, hematuria, urgency, vaginal bleeding, vaginal discharge and vaginal pain.   Musculoskeletal: Negative for gait problem.   Neurological: Negative for weakness and numbness.   Hematological: Negative for adenopathy. Does not bruise/bleed easily.   Psychiatric/Behavioral: The patient is not nervous/anxious.        Objective:   /67 (BP Location: Left arm, Patient Position: Sitting, BP Method: Medium  "(Automatic))   Pulse 96   Ht 5' 4" (1.626 m)   Wt 60.5 kg (133 lb 6.1 oz)   LMP  (LMP Unknown)   BMI 22.89 kg/m²      Physical Exam   Constitutional: She is oriented to person, place, and time. She appears well-developed and well-nourished.   HENT:   Head: Normocephalic and atraumatic.   Eyes: No scleral icterus.   Neck: No tracheal deviation present. No thyromegaly present.   Cardiovascular: Normal rate and regular rhythm.   Pulmonary/Chest: Effort normal and breath sounds normal.   Abdominal: Soft. She exhibits no distension and no mass. There is no tenderness. There is no rebound and no guarding. No hernia.   Genitourinary:   Genitourinary Comments: Not performed.    Musculoskeletal: She exhibits edema (+2 BLE). She exhibits no tenderness.   Lymphadenopathy:     She has no cervical adenopathy.   Neurological: She is alert and oriented to person, place, and time.   Skin: Skin is warm and dry.   Psychiatric: She has a normal mood and affect. Her behavior is normal. Judgment and thought content normal.       Assessment:       1. Fallopian tube cancer, carcinoma, right    2. Bilateral lower extremity edema    3. Chemotherapy management, encounter for        Plan:   Fallopian tube cancer, carcinoma, right  Proceed with cycle 5  RTC in 3 weeks.   Taxotere dose reduction due to BLE edema.   Low dose HCTZ for edema.   Transfuse 1 unit of blood   Bilateral lower extremity edema  -     hydroCHLOROthiazide (HYDRODIURIL) 12.5 MG Tab; Take 1 tablet (12.5 mg total) by mouth once daily.  Dispense: 30 tablet; Refill: 11    Chemotherapy management, encounter for        "

## 2020-03-04 ENCOUNTER — OFFICE VISIT (OUTPATIENT)
Dept: GYNECOLOGIC ONCOLOGY | Facility: CLINIC | Age: 65
End: 2020-03-04
Payer: COMMERCIAL

## 2020-03-04 ENCOUNTER — LAB VISIT (OUTPATIENT)
Dept: LAB | Facility: HOSPITAL | Age: 65
End: 2020-03-04
Payer: COMMERCIAL

## 2020-03-04 ENCOUNTER — INFUSION (OUTPATIENT)
Dept: INFUSION THERAPY | Facility: HOSPITAL | Age: 65
End: 2020-03-04
Attending: OBSTETRICS & GYNECOLOGY
Payer: COMMERCIAL

## 2020-03-04 VITALS
RESPIRATION RATE: 18 BRPM | HEIGHT: 64 IN | DIASTOLIC BLOOD PRESSURE: 65 MMHG | TEMPERATURE: 98 F | HEART RATE: 78 BPM | SYSTOLIC BLOOD PRESSURE: 103 MMHG | WEIGHT: 133.38 LBS | BODY MASS INDEX: 22.77 KG/M2

## 2020-03-04 VITALS
BODY MASS INDEX: 22.77 KG/M2 | WEIGHT: 133.38 LBS | HEIGHT: 64 IN | SYSTOLIC BLOOD PRESSURE: 108 MMHG | HEART RATE: 96 BPM | DIASTOLIC BLOOD PRESSURE: 67 MMHG

## 2020-03-04 DIAGNOSIS — T45.1X5A ANEMIA ASSOCIATED WITH CHEMOTHERAPY: ICD-10-CM

## 2020-03-04 DIAGNOSIS — C57.01 FALLOPIAN TUBE CANCER, CARCINOMA, RIGHT: Primary | ICD-10-CM

## 2020-03-04 DIAGNOSIS — R60.0 BILATERAL LOWER EXTREMITY EDEMA: ICD-10-CM

## 2020-03-04 DIAGNOSIS — D64.81 ANEMIA ASSOCIATED WITH CHEMOTHERAPY: ICD-10-CM

## 2020-03-04 DIAGNOSIS — Z51.11 CHEMOTHERAPY MANAGEMENT, ENCOUNTER FOR: ICD-10-CM

## 2020-03-04 LAB
ABO + RH BLD: NORMAL
BLD GP AB SCN CELLS X3 SERPL QL: NORMAL
BLD PROD TYP BPU: NORMAL
BLOOD UNIT EXPIRATION DATE: NORMAL
BLOOD UNIT TYPE CODE: 5100
BLOOD UNIT TYPE: NORMAL
CODING SYSTEM: NORMAL
DISPENSE STATUS: NORMAL
NUM UNITS TRANS PACKED RBC: NORMAL

## 2020-03-04 PROCEDURE — P9040 RBC LEUKOREDUCED IRRADIATED: HCPCS

## 2020-03-04 PROCEDURE — 96361 HYDRATE IV INFUSION ADD-ON: CPT

## 2020-03-04 PROCEDURE — 96377 APPLICATON ON-BODY INJECTOR: CPT

## 2020-03-04 PROCEDURE — 96413 CHEMO IV INFUSION 1 HR: CPT

## 2020-03-04 PROCEDURE — 99999 PR PBB SHADOW E&M-EST. PATIENT-LVL III: ICD-10-PCS | Mod: PBBFAC,,, | Performed by: OBSTETRICS & GYNECOLOGY

## 2020-03-04 PROCEDURE — 99214 PR OFFICE/OUTPT VISIT, EST, LEVL IV, 30-39 MIN: ICD-10-PCS | Mod: S$GLB,,, | Performed by: OBSTETRICS & GYNECOLOGY

## 2020-03-04 PROCEDURE — 96375 TX/PRO/DX INJ NEW DRUG ADDON: CPT

## 2020-03-04 PROCEDURE — 99999 PR PBB SHADOW E&M-EST. PATIENT-LVL III: CPT | Mod: PBBFAC,,, | Performed by: OBSTETRICS & GYNECOLOGY

## 2020-03-04 PROCEDURE — 25000003 PHARM REV CODE 250: Performed by: OBSTETRICS & GYNECOLOGY

## 2020-03-04 PROCEDURE — 3008F PR BODY MASS INDEX (BMI) DOCUMENTED: ICD-10-PCS | Mod: CPTII,S$GLB,, | Performed by: OBSTETRICS & GYNECOLOGY

## 2020-03-04 PROCEDURE — 36430 TRANSFUSION BLD/BLD COMPNT: CPT

## 2020-03-04 PROCEDURE — 99214 OFFICE O/P EST MOD 30 MIN: CPT | Mod: S$GLB,,, | Performed by: OBSTETRICS & GYNECOLOGY

## 2020-03-04 PROCEDURE — 3008F BODY MASS INDEX DOCD: CPT | Mod: CPTII,S$GLB,, | Performed by: OBSTETRICS & GYNECOLOGY

## 2020-03-04 PROCEDURE — 86920 COMPATIBILITY TEST SPIN: CPT

## 2020-03-04 PROCEDURE — 36415 COLL VENOUS BLD VENIPUNCTURE: CPT

## 2020-03-04 PROCEDURE — 96417 CHEMO IV INFUS EACH ADDL SEQ: CPT

## 2020-03-04 PROCEDURE — 96367 TX/PROPH/DG ADDL SEQ IV INF: CPT

## 2020-03-04 PROCEDURE — S0028 INJECTION, FAMOTIDINE, 20 MG: HCPCS | Performed by: OBSTETRICS & GYNECOLOGY

## 2020-03-04 PROCEDURE — 86901 BLOOD TYPING SEROLOGIC RH(D): CPT

## 2020-03-04 PROCEDURE — 63600175 PHARM REV CODE 636 W HCPCS: Performed by: OBSTETRICS & GYNECOLOGY

## 2020-03-04 RX ORDER — FAMOTIDINE 10 MG/ML
20 INJECTION INTRAVENOUS
Status: COMPLETED | OUTPATIENT
Start: 2020-03-04 | End: 2020-03-04

## 2020-03-04 RX ORDER — ACETAMINOPHEN 325 MG/1
650 TABLET ORAL
Status: COMPLETED | OUTPATIENT
Start: 2020-03-04 | End: 2020-03-04

## 2020-03-04 RX ORDER — HYDROCODONE BITARTRATE AND ACETAMINOPHEN 500; 5 MG/1; MG/1
TABLET ORAL ONCE
Status: DISCONTINUED | OUTPATIENT
Start: 2020-03-04 | End: 2020-03-04 | Stop reason: HOSPADM

## 2020-03-04 RX ORDER — HEPARIN 100 UNIT/ML
500 SYRINGE INTRAVENOUS
Status: CANCELLED | OUTPATIENT
Start: 2020-03-04

## 2020-03-04 RX ORDER — FAMOTIDINE 10 MG/ML
20 INJECTION INTRAVENOUS
Status: CANCELLED | OUTPATIENT
Start: 2020-03-04

## 2020-03-04 RX ORDER — HYDROCHLOROTHIAZIDE 12.5 MG/1
12.5 TABLET ORAL DAILY
Qty: 30 TABLET | Refills: 11 | Status: SHIPPED | OUTPATIENT
Start: 2020-03-04 | End: 2020-04-21

## 2020-03-04 RX ORDER — DIPHENHYDRAMINE HCL 25 MG
25 CAPSULE ORAL
Status: DISCONTINUED | OUTPATIENT
Start: 2020-03-04 | End: 2020-03-04 | Stop reason: HOSPADM

## 2020-03-04 RX ORDER — SODIUM CHLORIDE 0.9 % (FLUSH) 0.9 %
10 SYRINGE (ML) INJECTION
Status: CANCELLED | OUTPATIENT
Start: 2020-03-04

## 2020-03-04 RX ORDER — SODIUM CHLORIDE 0.9 % (FLUSH) 0.9 %
10 SYRINGE (ML) INJECTION
Status: DISCONTINUED | OUTPATIENT
Start: 2020-03-04 | End: 2020-03-04 | Stop reason: HOSPADM

## 2020-03-04 RX ORDER — HEPARIN 100 UNIT/ML
500 SYRINGE INTRAVENOUS
Status: DISCONTINUED | OUTPATIENT
Start: 2020-03-04 | End: 2020-03-04 | Stop reason: HOSPADM

## 2020-03-04 RX ADMIN — ACETAMINOPHEN 650 MG: 325 TABLET ORAL at 01:03

## 2020-03-04 RX ADMIN — SODIUM CHLORIDE 500 ML: 0.9 INJECTION, SOLUTION INTRAVENOUS at 08:03

## 2020-03-04 RX ADMIN — APREPITANT 130 MG: 130 INJECTION, EMULSION INTRAVENOUS at 10:03

## 2020-03-04 RX ADMIN — SODIUM CHLORIDE 500 ML: 0.9 INJECTION, SOLUTION INTRAVENOUS at 10:03

## 2020-03-04 RX ADMIN — DOCETAXEL 95 MG: 10 INJECTION, SOLUTION INTRAVENOUS at 11:03

## 2020-03-04 RX ADMIN — FAMOTIDINE 20 MG: 10 INJECTION, SOLUTION INTRAVENOUS at 10:03

## 2020-03-04 RX ADMIN — DIPHENHYDRAMINE HYDROCHLORIDE 50 MG: 50 INJECTION, SOLUTION INTRAMUSCULAR; INTRAVENOUS at 10:03

## 2020-03-04 RX ADMIN — PALONOSETRON: 0.05 INJECTION, SOLUTION INTRAVENOUS at 10:03

## 2020-03-04 RX ADMIN — CARBOPLATIN 360 MG: 10 INJECTION, SOLUTION INTRAVENOUS at 12:03

## 2020-03-04 RX ADMIN — PEGFILGRASTIM 6 MG: KIT SUBCUTANEOUS at 01:03

## 2020-03-04 NOTE — LETTER
March 4, 2020      Griselda Alegre MD  [address]  [facility] [address]           [facility] - GYN [facility]  [address]  [place] [address]  Phone: [phone]          Patient: Liz Coto   MR Number: [id number]   YOB: 1955   Date of Visit: 3/4/2020       Dear Dr. Griselda Alegre:    Thank you for referring Liz Coto to me for evaluation. Attached you will find relevant portions of my assessment and plan of care.    If you have questions, please do not hesitate to call me. I look forward to following Liz Coto along with you.    Sincerely,    Pérez Shukla MD    Enclosure  CC:  No Recipients    If you would like to receive this communication electronically, please contact [email or web address] or [phone] to request more information on [facility] Link access.    For providers and/or their staff who would like to refer a patient to [facility], please contact us through our one-stop-shop provider referral line, Helen Drew, at [phone].    If you feel you have received this communication in error or would no longer like to receive these types of communications, please e-mail [email or web address]

## 2020-03-04 NOTE — ED PROVIDER NOTES
Encounter Date: 3/2/2020       History     Chief Complaint   Patient presents with    Leg Swelling     Pt here with reports of leg and ankle swelling since today. Pt reports she is a cancer patient last chemo 2/19/20     Pt is a 63 y/o F with PMHx as per below who presents with concern for progressive bilateral leg swelling over the last several days.  She denies any other complaints at this time.          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    Chemotherapy induced nausea and vomiting 2/12/2020    Chemotherapy-induced nausea 11/22/2019    Elevated cancer antigen 125 (CA-125) 10/10/2019    Fallopian tube cancer, carcinoma, right 11/20/2019     Past Surgical History:   Procedure Laterality Date    BILATERAL SALPINGO-OOPHORECTOMY (BSO) N/A 11/4/2019    Procedure: SALPINGO-OOPHORECTOMY, BILATERAL;  Surgeon: Pérez Shukla MD;  Location: [address];  Service: OB/GYN;  Laterality: N/A;    DEBULKING OF TUMOR N/A 11/4/2019    Procedure: DEBULKING, NEOPLASM;  Surgeon: Pérez Shukla MD;  Location: [address];  Service: OB/GYN;  Laterality: N/A;    ECTOPIC PREGNANCY SURGERY      1981. thinks it was the left     LYSIS OF ADHESIONS N/A 11/4/2019    Procedure: LYSIS, ADHESIONS;  Surgeon: Pérez Shukla MD;  Location: [address];  Service: OB/GYN;  Laterality: N/A;  Sigmoid    OMENTECTOMY N/A 11/4/2019    Procedure: OMENTECTOMY;  Surgeon: Pérez Shukla MD;  Location: [address];  Service: OB/GYN;  Laterality: N/A;    PARA-AORTIC LYMPHADENECTOMY N/A 11/4/2019    Procedure: LYMPHADENECTOMY, PARA-AORTIC;  Surgeon: Clarence Colmenares MD;  Location: [address];  Service: General;  Laterality: N/A;    TONSILLECTOMY      TOTAL ABDOMINAL HYSTERECTOMY N/A 11/4/2019    Procedure: HYSTERECTOMY, TOTAL, ABDOMINAL;  Surgeon: Pérez Shukla MD;  Location: [facility]  "OR 2ND FLR;  Service: OB/GYN;  Laterality: N/A;     Family History   Problem Relation Age of Onset    Thyroid disease Mother     Ovarian cancer Mother     Lupus Father     Breast cancer Maternal Grandmother     Uterine cancer Neg Hx     Colon cancer Neg Hx     Anesthesia problems Neg Hx      Social History     Tobacco Use    Smoking status: Former Smoker     Types: Cigarettes    Smokeless tobacco: Never Used    Tobacco comment: "i quit when i was diagnosed with cancer since Oct 5t   Substance Use Topics    Alcohol use: Not Currently    Drug use: No     Review of Systems   Constitutional: Negative for chills and fever.   HENT: Negative for congestion, postnasal drip, rhinorrhea, sinus pressure, sneezing and sore throat.    Eyes: Negative for discharge and redness.   Respiratory: Negative for cough, choking, chest tightness, shortness of breath, wheezing and stridor.    Cardiovascular: Positive for leg swelling. Negative for chest pain and palpitations.   Gastrointestinal: Negative for abdominal pain, blood in stool, constipation, diarrhea, nausea and vomiting.   Genitourinary: Negative for dysuria, hematuria, pelvic pain, vaginal bleeding, vaginal discharge and vaginal pain.   Musculoskeletal: Negative for arthralgias and myalgias.   Skin: Negative for rash and wound.   Neurological: Negative for weakness, light-headedness and headaches.   Hematological: Does not bruise/bleed easily.   Psychiatric/Behavioral: Negative for agitation and confusion. The patient is not nervous/anxious.        Physical Exam     Initial Vitals [03/02/20 2119]   BP Pulse Resp Temp SpO2   104/68 95 18 98.6 °F (37 °C) 100 %      MAP       --         Physical Exam    Nursing note and vitals reviewed.  Constitutional: She appears well-developed and well-nourished. She is not diaphoretic. No distress.   HENT:   Head: Normocephalic and atraumatic.   Eyes: Conjunctivae are normal. Right eye exhibits no discharge. Left eye exhibits no " discharge. No scleral icterus.   Neck: No tracheal deviation present. No JVD present.   Cardiovascular: Normal rate, regular rhythm, normal heart sounds and intact distal pulses. Exam reveals no gallop and no friction rub.    No murmur heard.  Pulmonary/Chest: Breath sounds normal. No stridor. No respiratory distress. She has no wheezes. She has no rhonchi. She has no rales. She exhibits no tenderness.   Abdominal: Soft. She exhibits no distension. There is no tenderness. There is no rebound and no guarding.   Musculoskeletal: Normal range of motion. She exhibits edema (1+pedal edema noted to the ankles bilaterally). She exhibits no tenderness.   Neurological: She is alert and oriented to person, place, and time. She has normal strength. GCS score is 15. GCS eye subscore is 4. GCS verbal subscore is 5. GCS motor subscore is 6.   ZORAIDA with NGND's   Skin: Skin is warm and dry. Capillary refill takes less than 2 seconds. No rash and no abscess noted. No erythema. No pallor.   Psychiatric: She has a normal mood and affect. Her behavior is normal. Judgment and thought content normal.         ED Course   Procedures  Labs Reviewed   CBC W/ AUTO DIFFERENTIAL - Abnormal; Notable for the following components:       Result Value    RBC 2.54 (*)     Hemoglobin 8.1 (*)     Hematocrit 25.3 (*)     Mean Corpuscular Volume 100 (*)     Mean Corpuscular Hemoglobin 31.9 (*)     RDW 22.7 (*)     Immature Granulocytes 0.6 (*)     Immature Grans (Abs) 0.05 (*)     All other components within normal limits   COMPREHENSIVE METABOLIC PANEL - Abnormal; Notable for the following components:    Total Protein 5.9 (*)     Albumin 3.1 (*)     Anion Gap 7 (*)     eGFR if non  54 (*)     All other components within normal limits   URINALYSIS, REFLEX TO URINE CULTURE - Abnormal; Notable for the following components:    Appearance, UA Hazy (*)     Occult Blood UA 1+ (*)     Leukocytes, UA Trace (*)     All other components within  normal limits    Narrative:     Preferred Collection Type->Urine, Clean Catch   URINALYSIS MICROSCOPIC - Abnormal; Notable for the following components:    RBC, UA 6 (*)     All other components within normal limits    Narrative:     Preferred Collection Type->Urine, Clean Catch   B-TYPE NATRIURETIC PEPTIDE   TSH   PROTIME-INR   APTT          Imaging Results          US Lower Extremity Veins Bilateral (Final result)  Result time 03/02/20 23:15:45    Final result by Irlanda Inman MD (03/02/20 23:15:45)                 Impression:      No evidence of deep venous thrombosis in either lower extremity.      Electronically signed by: Irlanda Inman MD  Date:    03/02/2020  Time:    23:15             Narrative:    EXAMINATION:  US LOWER EXTREMITY VEINS BILATERAL    CLINICAL HISTORY:  Localized edema    TECHNIQUE:  Duplex and color flow Doppler and dynamic compression was performed of the bilateral lower extremity veins was performed.    COMPARISON:  None    FINDINGS:  Right thigh veins: The common femoral, femoral, popliteal, upper greater saphenous, and deep femoral veins are patent and free of thrombus. The veins are normally compressible and have normal phasic flow and augmentation response.    Right calf veins: The visualized calf veins are patent.    Left thigh veins: The common femoral, femoral, popliteal, upper greater saphenous, and deep femoral veins are patent and free of thrombus. The veins are normally compressible and have normal phasic flow and augmentation response.    Left calf veins: The visualized calf veins are patent.    Miscellaneous: None.                                Pt arrived alert, afebrile, non-toxic in appearance, in no acute respiratory distress with VSS. Labs unremarkable in comparison to previous studies.  EKG revealed no acute findings concerning for ischemia, arrhythmia, heart block or any pathology to warrant cardiology consultation.  US negative for DVT.  Pt has no clinical findings to  warrant further evaluation at this time.  Pt discharged and counseled on the need to return to the nearest emergency room if they experience any other concerning symptoms.  Pt counseled to F/U outpatient with a PCP over the next two to three days.    Kerwin Goode MD                                           Clinical Impression:       ICD-10-CM ICD-9-CM   1. Bilateral lower extremity edema R60.0 782.3             ED Disposition Condition    Discharge Stable        ED Prescriptions     None        Follow-up Information     Follow up With Specialties Details Why Contact Info    Griselda Alegre MD Internal Medicine Schedule an appointment as soon as possible for a visit in 3 days to follow-up on today's visit [facility] [address]  [phone]      [facility] Emergency Medicine Go to  As needed, If symptoms worsen [address]  [facility] [address] [phone]                     I, Kerwin Godoe, personally performed the services described in this documentation. All medical record entries made by the scribe were at my direction and in my presence.  I have reviewed the chart and agree that the record reflects my personal performance and is accurate and complete.                   Kerwin Goode MD  03/04/20 [id number]

## 2020-03-23 ENCOUNTER — TELEPHONE (OUTPATIENT)
Dept: GYNECOLOGIC ONCOLOGY | Facility: CLINIC | Age: 65
End: 2020-03-23

## 2020-03-23 NOTE — TELEPHONE ENCOUNTER
----- Message from Dina Watts sent at 3/23/2020 10:37 AM CDT -----  PT has questions about chemo scheduled for Wednesday.  [phone]

## 2020-03-25 ENCOUNTER — OFFICE VISIT (OUTPATIENT)
Dept: GYNECOLOGIC ONCOLOGY | Facility: CLINIC | Age: 65
End: 2020-03-25
Payer: COMMERCIAL

## 2020-03-25 ENCOUNTER — INFUSION (OUTPATIENT)
Dept: INFUSION THERAPY | Facility: HOSPITAL | Age: 65
End: 2020-03-25
Attending: OBSTETRICS & GYNECOLOGY
Payer: COMMERCIAL

## 2020-03-25 VITALS
HEIGHT: 64 IN | WEIGHT: 129.19 LBS | RESPIRATION RATE: 18 BRPM | DIASTOLIC BLOOD PRESSURE: 70 MMHG | SYSTOLIC BLOOD PRESSURE: 108 MMHG | HEART RATE: 77 BPM | BODY MASS INDEX: 22.06 KG/M2 | TEMPERATURE: 99 F

## 2020-03-25 DIAGNOSIS — C57.01 FALLOPIAN TUBE CANCER, CARCINOMA, RIGHT: Primary | ICD-10-CM

## 2020-03-25 DIAGNOSIS — Z51.11 CHEMOTHERAPY MANAGEMENT, ENCOUNTER FOR: ICD-10-CM

## 2020-03-25 PROCEDURE — 96361 HYDRATE IV INFUSION ADD-ON: CPT

## 2020-03-25 PROCEDURE — 96375 TX/PRO/DX INJ NEW DRUG ADDON: CPT

## 2020-03-25 PROCEDURE — 25000003 PHARM REV CODE 250: Performed by: OBSTETRICS & GYNECOLOGY

## 2020-03-25 PROCEDURE — 96367 TX/PROPH/DG ADDL SEQ IV INF: CPT

## 2020-03-25 PROCEDURE — 96377 APPLICATON ON-BODY INJECTOR: CPT

## 2020-03-25 PROCEDURE — 63600175 PHARM REV CODE 636 W HCPCS: Mod: TB | Performed by: OBSTETRICS & GYNECOLOGY

## 2020-03-25 PROCEDURE — 96413 CHEMO IV INFUSION 1 HR: CPT

## 2020-03-25 PROCEDURE — 99214 OFFICE O/P EST MOD 30 MIN: CPT | Mod: 95,,, | Performed by: OBSTETRICS & GYNECOLOGY

## 2020-03-25 PROCEDURE — S0028 INJECTION, FAMOTIDINE, 20 MG: HCPCS | Performed by: OBSTETRICS & GYNECOLOGY

## 2020-03-25 PROCEDURE — 99214 PR OFFICE/OUTPT VISIT, EST, LEVL IV, 30-39 MIN: ICD-10-PCS | Mod: 95,,, | Performed by: OBSTETRICS & GYNECOLOGY

## 2020-03-25 PROCEDURE — 96417 CHEMO IV INFUS EACH ADDL SEQ: CPT

## 2020-03-25 RX ORDER — SODIUM CHLORIDE 0.9 % (FLUSH) 0.9 %
10 SYRINGE (ML) INJECTION
Status: DISCONTINUED | OUTPATIENT
Start: 2020-03-25 | End: 2020-03-25 | Stop reason: HOSPADM

## 2020-03-25 RX ORDER — FAMOTIDINE 10 MG/ML
20 INJECTION INTRAVENOUS
Status: CANCELLED | OUTPATIENT
Start: 2020-03-25

## 2020-03-25 RX ORDER — SODIUM CHLORIDE 0.9 % (FLUSH) 0.9 %
10 SYRINGE (ML) INJECTION
Status: CANCELLED | OUTPATIENT
Start: 2020-03-25

## 2020-03-25 RX ORDER — FAMOTIDINE 10 MG/ML
20 INJECTION INTRAVENOUS
Status: COMPLETED | OUTPATIENT
Start: 2020-03-25 | End: 2020-03-25

## 2020-03-25 RX ORDER — HEPARIN 100 UNIT/ML
500 SYRINGE INTRAVENOUS
Status: CANCELLED | OUTPATIENT
Start: 2020-03-25

## 2020-03-25 RX ORDER — HEPARIN 100 UNIT/ML
500 SYRINGE INTRAVENOUS
Status: DISCONTINUED | OUTPATIENT
Start: 2020-03-25 | End: 2020-03-25 | Stop reason: HOSPADM

## 2020-03-25 RX ADMIN — SODIUM CHLORIDE 500 ML: 0.9 INJECTION, SOLUTION INTRAVENOUS at 11:03

## 2020-03-25 RX ADMIN — DIPHENHYDRAMINE HYDROCHLORIDE 50 MG: 50 INJECTION, SOLUTION INTRAMUSCULAR; INTRAVENOUS at 11:03

## 2020-03-25 RX ADMIN — DOCETAXEL 95 MG: 10 INJECTION, SOLUTION INTRAVENOUS at 11:03

## 2020-03-25 RX ADMIN — DEXAMETHASONE SODIUM PHOSPHATE: 4 INJECTION, SOLUTION INTRA-ARTICULAR; INTRALESIONAL; INTRAMUSCULAR; INTRAVENOUS; SOFT TISSUE at 11:03

## 2020-03-25 RX ADMIN — CARBOPLATIN 410 MG: 10 INJECTION, SOLUTION INTRAVENOUS at 12:03

## 2020-03-25 RX ADMIN — APREPITANT 130 MG: 130 INJECTION, EMULSION INTRAVENOUS at 11:03

## 2020-03-25 RX ADMIN — PEGFILGRASTIM 6 MG: KIT SUBCUTANEOUS at 02:03

## 2020-03-25 RX ADMIN — FAMOTIDINE 20 MG: 10 INJECTION INTRAVENOUS at 11:03

## 2020-03-25 NOTE — PLAN OF CARE
Patient tolerated taxotere/carbo/ivfs well today. OBI placed on abdomen. PIV removed, catheter tip intact. Patient participated in bell ringing ceremony today. Declined avs. Discharged home, ambulated independently. Encouraged frequent handwashing and monitoring of temp of 100.4 or >. Verbalized understanding to call MD for any questions/concerns.

## 2020-03-25 NOTE — PLAN OF CARE
Problem: Adult Inpatient Plan of Care  Goal: Optimal Comfort and Wellbeing  Intervention: Provide Person-Centered Care  Flowsheets (Taken 3/25/2020 1211)  Trust Relationship/Rapport: thoughts/feelings acknowledged; care explained; choices provided; emotional support provided; empathic listening provided; questions answered; questions encouraged; reassurance provided

## 2020-03-25 NOTE — PROGRESS NOTES
Subjective:       Patient ID: Liz Coto is a 64 y.o. female.    Chief Complaint: No chief complaint on file.    HPI   The patient location is: Home  The chief complaint leading to consultation is: fallopian tube cancer and chemotherapy.   Visit type: Virtual visit with synchronous audio and video  Total time spent with patient: 30 minutes including review of labs and placing orders.   Each patient to whom he or she provides medical services by telemedicine is:  (1) informed of the relationship between the physician and patient and the respective role of any other health care provider with respect to management of the patient; and (2) notified that he or she may decline to receive medical services by telemedicine and may withdraw from such care at any time.    Notes:   Patient comes in today for cycle 6 of taxotere and carboplatin for  FIGO stage [id number] grade 3 right fallopian tube adenocarcinoma.      Allergic reaction with taxol with cycle 1 thus received only carboplatin. Changed to taxotere for cycle 2. No problems since change to taxotere.     Took steroids.   No complaints.   Working from home due to Covid-19 pandemic.   Limiting exposure to others.        : 1097>20(before C1)>13(p C3)>15(C6)      Chemotherapy labs have been reviewed and are appropriate for treatment.      Her oncologic history is:  Nov 2019: HOLLY/BSO/small bowel resection/resection of PA sherry mass and omentectomy. Final pathology showed FIGO stage KKTY8vp grade 3 right fallopian tube adenocarcinoma with retroperitoneal nodes. 2 omental nodes also found. No gross omental involvement. No residual disease.      Preop  10/2019 was 1097     Dec 2019: started taxol and carboplatin. Allergic reaction to taxol nthus received only carboplatin.  is 20.  Jan 2020: changed to taxotere and carboplatin.   Mar 2, 2020: in ED with BLE edema. Stanton CF doppler negative.      Review of Systems   Constitutional: Negative for chills,  fatigue and fever.   Respiratory: Negative for cough, shortness of breath and wheezing.    Cardiovascular: Negative for chest pain, palpitations and leg swelling.   Gastrointestinal: Negative for abdominal pain, constipation, diarrhea, nausea and vomiting.   Genitourinary: Negative for difficulty urinating, dysuria, frequency, genital sores, hematuria, urgency, vaginal bleeding, vaginal discharge and vaginal pain.   Musculoskeletal: Negative for gait problem.   Neurological: Negative for weakness and numbness.   Hematological: Negative for adenopathy. Does not bruise/bleed easily.   Psychiatric/Behavioral: The patient is not nervous/anxious.        Objective:   LMP  (LMP Unknown)      Physical Exam   Constitutional: She is oriented to person, place, and time. She appears well-developed and well-nourished.   HENT:   Head: Normocephalic and atraumatic.   Eyes: EOM are normal.   Neck: Normal range of motion.   Pulmonary/Chest: Effort normal.   Musculoskeletal: She exhibits no edema.   No LLE per patient inspection and reporting.    Neurological: She is alert and oriented to person, place, and time.   Skin: Skin is dry.   Psychiatric: She has a normal mood and affect. Her behavior is normal. Judgment and thought content normal.       Assessment:       1. Fallopian tube cancer, carcinoma, right    2. Chemotherapy management, encounter for        Plan:   Fallopian tube cancer, carcinoma, right  Proceed with cycle 6  RTC in 4 weeks with imaging and labs pending Covid-19 events.     -     CT Abdomen Pelvis With Contrast; Future; Expected date: 04/25/2020    Chemotherapy management, encounter for

## 2020-04-20 ENCOUNTER — HOSPITAL ENCOUNTER (OUTPATIENT)
Dept: RADIOLOGY | Facility: HOSPITAL | Age: 65
Discharge: HOME | End: 2020-04-20
Attending: OBSTETRICS & GYNECOLOGY
Payer: COMMERCIAL

## 2020-04-20 DIAGNOSIS — C57.01 FALLOPIAN TUBE CANCER, CARCINOMA, RIGHT: ICD-10-CM

## 2020-04-20 PROCEDURE — 74177 CT ABD & PELVIS W/CONTRAST: CPT | Mod: TC

## 2020-04-20 PROCEDURE — 74177 CT ABDOMEN PELVIS WITH CONTRAST: ICD-10-PCS | Mod: 26,,, | Performed by: RADIOLOGY

## 2020-04-20 PROCEDURE — 25500020 PHARM REV CODE 255: Performed by: OBSTETRICS & GYNECOLOGY

## 2020-04-20 PROCEDURE — 74177 CT ABD & PELVIS W/CONTRAST: CPT | Mod: 26,,, | Performed by: RADIOLOGY

## 2020-04-20 RX ADMIN — IOHEXOL 75 ML: 350 INJECTION, SOLUTION INTRAVENOUS at 11:04

## 2020-04-20 RX ADMIN — IOHEXOL 15 ML: 300 INJECTION, SOLUTION INTRAVENOUS at 11:04

## 2020-04-21 ENCOUNTER — OFFICE VISIT (OUTPATIENT)
Dept: GYNECOLOGIC ONCOLOGY | Facility: CLINIC | Age: 65
End: 2020-04-21
Payer: COMMERCIAL

## 2020-04-21 ENCOUNTER — TELEPHONE (OUTPATIENT)
Dept: GYNECOLOGIC ONCOLOGY | Facility: CLINIC | Age: 65
End: 2020-04-21

## 2020-04-21 DIAGNOSIS — Z01.84 ANTIBODY RESPONSE EXAMINATION: ICD-10-CM

## 2020-04-21 DIAGNOSIS — C57.01 FALLOPIAN TUBE CANCER, CARCINOMA, RIGHT: Primary | ICD-10-CM

## 2020-04-21 PROCEDURE — 99214 OFFICE O/P EST MOD 30 MIN: CPT | Mod: 95,,, | Performed by: OBSTETRICS & GYNECOLOGY

## 2020-04-21 PROCEDURE — 99214 PR OFFICE/OUTPT VISIT, EST, LEVL IV, 30-39 MIN: ICD-10-PCS | Mod: 95,,, | Performed by: OBSTETRICS & GYNECOLOGY

## 2020-04-21 NOTE — PROGRESS NOTES
Subjective:       Patient ID: Liz Coto is a 64 y.o. female.    Chief Complaint: Fallopian Cancer (follow up)    HPI   The patient location is: home  The chief complaint leading to consultation is: fallopian tube cancer   Visit type: audiovisual  Total time spent with patient: 20 minutes   Each patient to whom he or she provides medical services by telemedicine is:  (1) informed of the relationship between the physician and patient and the respective role of any other health care provider with respect to management of the patient; and (2) notified that he or she may decline to receive medical services by telemedicine and may withdraw from such care at any time.    Notes:   Patient seen today virtually after cycle 6 of taxotere and carboplatin on 4/15/2020 for  FIGO stage [id number] grade 3 right fallopian tube adenocarcinoma.      Allergic reaction with taxol with cycle 1 thus received only carboplatin. Changed to taxotere for cycle 2. No problems since change to taxotere.        Working from home due to Covid-19 pandemic.   Limiting exposure to others.         : 1097>20(before C1)>13(p C3)>15(C6)>14.            Her oncologic history is:  Nov 2019: HOLLY/BSO/small bowel resection/resection of PA sherry mass and omentectomy. Final pathology showed FIGO stage XEUI3dx grade 3 right fallopian tube adenocarcinoma with retroperitoneal nodes. 2 omental nodes also found. No gross omental involvement. No residual disease.      Preop  10/2019 was 1097     Dec 2019: started taxol and carboplatin. Allergic reaction to taxol nthus received only carboplatin.  is 20.  Jan 2020: changed to taxotere and carboplatin.   Mar 2, 2020: in ED with BLE edema. Stanton CF doppler negative.   April 2020: cycle 6 of taxotere and carboplatin on 4/15/2020 for  FIGO stage WCSA0ym grade 3 right fallopian tube adenocarcinoma.    Allergic reaction with taxol with cycle 1 thus received only carboplatin. Changed to taxotere for  cycle 2. No problems since change to taxotere.    : 1097>20(before C1)>13(p C3)>15(C6)>14.         Review of Systems   Constitutional: Negative for chills, fatigue and fever.   Respiratory: Negative for cough, shortness of breath and wheezing.    Cardiovascular: Negative for chest pain, palpitations and leg swelling.   Gastrointestinal: Negative for abdominal pain, constipation, diarrhea, nausea and vomiting.   Genitourinary: Negative for difficulty urinating, dysuria, frequency, genital sores, hematuria, urgency, vaginal bleeding, vaginal discharge and vaginal pain.   Neurological: Negative for weakness.   Hematological: Negative for adenopathy. Does not bruise/bleed easily.   Psychiatric/Behavioral: The patient is not nervous/anxious.        Objective:   LMP  (LMP Unknown)      Physical Exam   Constitutional: She is oriented to person, place, and time. She appears well-developed and well-nourished.   HENT:   Head: Normocephalic and atraumatic.   Eyes: EOM are normal.   Neck: Normal range of motion.   Pulmonary/Chest: Effort normal.   Neurological: She is alert and oriented to person, place, and time.   Skin: Skin is dry.   Psychiatric: She has a normal mood and affect. Her behavior is normal. Judgment and thought content normal.       Assessment:       1. Fallopian tube cancer, carcinoma, right        Plan:   Fallopian tube cancer, carcinoma, right   HIGINIO    RTC in 3 months.   -     ; Future; Expected date: 07/21/2020    I discussed with her that she will need BRCA testing.  This had previously been ordered but not obtained.  This is partially due to the last 2 visits being done virtually due to the COVID-19 pandemic.  Message has been sent to arrange for BRAC testing.

## 2020-04-23 ENCOUNTER — LAB VISIT (OUTPATIENT)
Dept: LAB | Facility: HOSPITAL | Age: 65
End: 2020-04-23
Attending: OBSTETRICS & GYNECOLOGY
Payer: COMMERCIAL

## 2020-04-23 DIAGNOSIS — C57.01 FALLOPIAN TUBE CANCER, CARCINOMA, RIGHT: ICD-10-CM

## 2020-04-23 PROCEDURE — 36415 COLL VENOUS BLD VENIPUNCTURE: CPT

## 2020-04-28 ENCOUNTER — TELEPHONE (OUTPATIENT)
Dept: GYNECOLOGIC ONCOLOGY | Facility: CLINIC | Age: 65
End: 2020-04-28

## 2020-04-30 ENCOUNTER — TELEPHONE (OUTPATIENT)
Dept: GYNECOLOGIC ONCOLOGY | Facility: CLINIC | Age: 65
End: 2020-04-30

## 2020-05-14 LAB — INTEGRATED BRAC ANALYSIS: NORMAL

## 2020-05-21 DIAGNOSIS — Z01.84 ANTIBODY RESPONSE EXAMINATION: ICD-10-CM

## 2020-06-20 DIAGNOSIS — Z01.84 ANTIBODY RESPONSE EXAMINATION: ICD-10-CM

## 2020-06-26 ENCOUNTER — OFFICE VISIT (OUTPATIENT)
Dept: INTERNAL MEDICINE | Facility: CLINIC | Age: 65
End: 2020-06-26
Payer: COMMERCIAL

## 2020-06-26 ENCOUNTER — PATIENT OUTREACH (OUTPATIENT)
Dept: ADMINISTRATIVE | Facility: HOSPITAL | Age: 65
End: 2020-06-26

## 2020-06-26 VITALS
SYSTOLIC BLOOD PRESSURE: 118 MMHG | OXYGEN SATURATION: 99 % | HEART RATE: 88 BPM | HEIGHT: 65 IN | BODY MASS INDEX: 21.34 KG/M2 | WEIGHT: 128.06 LBS | DIASTOLIC BLOOD PRESSURE: 68 MMHG | TEMPERATURE: 99 F

## 2020-06-26 DIAGNOSIS — J02.9 SORE THROAT: Primary | ICD-10-CM

## 2020-06-26 PROCEDURE — 3008F PR BODY MASS INDEX (BMI) DOCUMENTED: ICD-10-PCS | Mod: CPTII,S$GLB,, | Performed by: FAMILY MEDICINE

## 2020-06-26 PROCEDURE — 99213 PR OFFICE/OUTPT VISIT, EST, LEVL III, 20-29 MIN: ICD-10-PCS | Mod: S$GLB,,, | Performed by: FAMILY MEDICINE

## 2020-06-26 PROCEDURE — 99213 OFFICE O/P EST LOW 20 MIN: CPT | Mod: S$GLB,,, | Performed by: FAMILY MEDICINE

## 2020-06-26 PROCEDURE — 99999 PR PBB SHADOW E&M-EST. PATIENT-LVL III: ICD-10-PCS | Mod: PBBFAC,,, | Performed by: FAMILY MEDICINE

## 2020-06-26 PROCEDURE — 3008F BODY MASS INDEX DOCD: CPT | Mod: CPTII,S$GLB,, | Performed by: FAMILY MEDICINE

## 2020-06-26 PROCEDURE — 99999 PR PBB SHADOW E&M-EST. PATIENT-LVL III: CPT | Mod: PBBFAC,,, | Performed by: FAMILY MEDICINE

## 2020-06-26 NOTE — PATIENT INSTRUCTIONS
Lymphangitis  The lymphatic system is a part of the immune system and helps to fight against infection and inflammation. It consists of lymph glands (lymph nodes) throughout the body. Lymph channels carry lymph fluid from parts of the body to nearby lymph glands to be filtered.  Lymphangitis is an inflammation of lymph channels. It is caused by infected lymph fluid traveling from a site of infection. Symptoms are tender red streaks on the skin near the area of infection. This condition is commonly called blood poisoning, but the blood is not involved.  The condition can become very serious if not treated.The infection is treated with antibiotics or other medicines that treat the infection. If an abscess is present, it may be drained.When the site of infection is treated, the lymphangitis goes away.  Home care  · Take all medicine to treat the infection exactly as prescribed until it is gone. Be very careful not to miss any doses. Do not stop taking the medicine until it is finished or your healthcare provider tells you to stop, even if you feel better.  · Follow your healthcare provider's instructions for taking other medicines. Ask your healthcare provider before taking any over-the-counter medicines.  · Make a warm compress by running hot water over a face cloth. Apply it to the sore area until the compress cools off. Repeat 3 times a day for the first 3 days. The heat will increase the blood flow to the area and speed the healing process. As an alternative, you can  the shower and direct the warm spray to the area.  Follow-up care  Follow up with your healthcare provider, or as directed by our staff.  When to seek medical advice  Call your healthcare provider if any of the following occur:  · Increasing areas of redness or pain at the site of infection  · Red streaks continue to grow  · Pus or fluid draining from the lymph node  · Fever of 100.4°F (38°C) or higher, or as directed by your healthcare  provider.  Date Last Reviewed: 9/25/2015  © 1659-3668 [employer] [address] [place] [address], [place] [address]. All rights reserved. This information is not intended as a substitute for professional medical care. Always follow your healthcare professional's instructions.

## 2020-06-26 NOTE — PROGRESS NOTES
Subjective:       Patient ID: Liz Coto is a 64 y.o. female.    Chief Complaint:   Sore Throat (pain when swallowing)    Sore Throat   This is a new problem. Episode onset: 4 days ago. The problem has been gradually improving. Neither side of throat is experiencing more pain than the other. There has been no fever. The pain is mild. Pertinent negatives include no coughing or shortness of breath. She has tried nothing for the symptoms.   No pain in throat - hurts anterior neck bilaterally at jaw line when she swallows.  No lumps, bumps, or swelling.  Review of Systems   Constitutional: Negative for chills, fatigue and fever.   HENT: Positive for sore throat. Negative for postnasal drip and rhinorrhea.    Respiratory: Negative for cough and shortness of breath.      Current Outpatient Medications   Medication Sig    ibuprofen (ADVIL,MOTRIN) 600 MG tablet Take 1 tablet (600 mg total) by mouth every 6 (six) hours. (Patient not taking: Reported on 3/4/2020)    ondansetron (ZOFRAN) 8 MG tablet Take 1 tablet (8 mg total) by mouth every 12 (twelve) hours as needed for Nausea. (Patient not taking: Reported on 3/4/2020)    oxyCODONE (ROXICODONE) 5 MG immediate release tablet Take 1 tablet (5 mg total) by mouth every 12 (twelve) hours as needed for Pain. (Patient not taking: Reported on 3/25/2020)     No current facility-administered medications for this visit.      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/2019     Family History   Problem Relation Age of Onset    Thyroid disease Mother     Ovarian cancer Mother 40    Lupus Father     Breast cancer Maternal Grandmother         50s    Uterine  "cancer Neg Hx     Colon cancer Neg Hx     Anesthesia problems Neg Hx      Social History     Tobacco Use    Smoking status: Former Smoker     Types: Cigarettes    Smokeless tobacco: Never Used    Tobacco comment: "i quit when i was diagnosed with cancer since Oct 5t   Substance Use Topics    Alcohol use: Not Currently    Drug use: No       Objective:      Vitals:    06/26/20 1511   BP: 118/68   BP Location: Left arm   Patient Position: Sitting   BP Method: Medium (Manual)   Pulse: 88   Temp: 98.6 °F (37 °C)   SpO2: 99%   Weight: 58.1 kg (128 lb 1.4 oz)   Height: 5' 5" (1.651 m)     Physical Exam  Vitals signs and nursing note reviewed.   Constitutional:       General: She is not in acute distress.     Appearance: She is well-developed. She is not diaphoretic.   HENT:      Head: Normocephalic and atraumatic.      Right Ear: Tympanic membrane normal. No tenderness.      Left Ear: Tympanic membrane normal. No tenderness.      Mouth/Throat:      Pharynx: Uvula midline.   Eyes:      Conjunctiva/sclera: Conjunctivae normal.      Pupils: Pupils are equal, round, and reactive to light.   Neck:      Musculoskeletal: Normal range of motion and neck supple.        Comments: No nodes, nodules, or masses.  Cardiovascular:      Rate and Rhythm: Normal rate and regular rhythm.      Heart sounds: Normal heart sounds. No murmur. No friction rub. No gallop.    Pulmonary:      Effort: Pulmonary effort is normal.      Breath sounds: Normal breath sounds. No wheezing or rales.   Skin:     General: Skin is warm and dry.   Neurological:      Mental Status: She is alert and oriented to person, place, and time.   Psychiatric:         Behavior: Behavior normal.         Thought Content: Thought content normal.              Assessment and Plan:     Sore throat    Possible mild adenitis.     Follow up if symptoms worsen or fail to improve.      Jj Garrido MD                  "

## 2020-07-02 ENCOUNTER — TELEPHONE (OUTPATIENT)
Dept: GYNECOLOGIC ONCOLOGY | Facility: CLINIC | Age: 65
End: 2020-07-02

## 2020-07-02 NOTE — TELEPHONE ENCOUNTER
Spoke with our patient about her  appointment time reschedule in gyn oncology she agreed she voiced understanding of the date, time and location. All questions answered appointment mail. MA/JOSE /Preceptor Deven TERRELL

## 2020-07-20 DIAGNOSIS — Z01.84 ANTIBODY RESPONSE EXAMINATION: ICD-10-CM

## 2020-07-21 NOTE — PROGRESS NOTES
Subjective:       Patient ID: Liz Coto is a 64 y.o. female.    Chief Complaint: Follow-up (fallopian tube cancer ) and Well Woman    HPI   Patient comes in today for her WWE and follow-up forFIGO stage SMFH0nd grade 3 right fallopian tube adenocarcinoma.      No gyn complaints. Some resolving fatigue.      2020: : 11.      MM16  CBE: 2020  Colonsocopy: never     Her oncologic history is:  2019: HOLLY/BSO/small bowel resection/resection of PA sherry mass and omentectomy. Final pathology showed FIGO stage MJAA6zw grade 3 right fallopian tube adenocarcinoma with retroperitoneal nodes. 2 omental nodes also found. No gross omental involvement. No residual disease.      Preop  10/2019 was 1097     Dec 2019: started taxol and carboplatin. Allergic reaction to taxol nthus received only carboplatin.  is 20.  2020: changed to taxotere and carboplatin.   Mar 2, 2020: in ED with BLE edema. Stanton CF doppler negative.   2020: cycle 6 of taxotere and carboplatin on 4/15/2020 for  FIGO stage ZAYM0ty grade 3 right fallopian tube adenocarcinoma.    Allergic reaction with taxol with cycle 1 thus received only carboplatin. Changed to taxotere for cycle 2. No problems since change to taxotere.    : 1097>20(before C1)>13(p C3)>15(C6)>14.    2020: completed cycle 6 of taxotere and carboplatin on 4/15/2020. : 14. Completion CT AP: HIGINIO.       Vane Bal: VUS:   HOXB13 c.853del (p.*285Lysext*97)  MSH3 c.1567G>A (p.Vgm246Ycx)  MSH3 c.2511G>A (p.Fdy564Gzy)    Review of Systems   Constitutional: Positive for fatigue. Negative for chills and fever.   Respiratory: Negative for cough, shortness of breath and wheezing.    Cardiovascular: Negative for chest pain, palpitations and leg swelling.   Gastrointestinal: Negative for abdominal pain, constipation, diarrhea, nausea and vomiting.   Genitourinary: Negative for difficulty urinating, dysuria, frequency, genital sores,  hematuria, urgency, vaginal bleeding, vaginal discharge and vaginal pain.   Neurological: Negative for weakness.   Hematological: Negative for adenopathy. Does not bruise/bleed easily.   Psychiatric/Behavioral: The patient is not nervous/anxious.        Objective:   /68   Pulse 73   Wt 59.7 kg (131 lb 9.8 oz)   LMP  (LMP Unknown)   BMI 21.90 kg/m²      Physical Exam  Constitutional:       Appearance: She is well-developed.   HENT:      Head: Normocephalic and atraumatic.   Eyes:      General: No scleral icterus.  Neck:      Thyroid: No thyroid mass or thyromegaly.      Trachea: No tracheal deviation.   Cardiovascular:      Rate and Rhythm: Normal rate and regular rhythm.   Pulmonary:      Effort: Pulmonary effort is normal.      Breath sounds: Normal breath sounds. No wheezing.   Chest:      Breasts:         Right: No mass, nipple discharge, skin change or tenderness.         Left: No mass, nipple discharge, skin change or tenderness.   Abdominal:      General: There is no distension.      Palpations: There is no mass.      Tenderness: There is no abdominal tenderness. There is no guarding or rebound.   Genitourinary:     Comments: Bimanual exam:  Vulva: no lesions. Normal appearance  Urethra: Normal size and location. No lesions  Bladder: No masses or tenderness.  Vagina: normal mucosa. No lesion  Cervix: absent.   Uterus: absent.  Adnexa: no masses.  Rectovaginal: No posterior cul de sac thickening or nodularity.  Rectal: no masses. Nontender. Normal tone.     Musculoskeletal:         General: No tenderness.   Lymphadenopathy:      Cervical: No cervical adenopathy.      Upper Body:      Right upper body: No supraclavicular adenopathy.      Left upper body: No supraclavicular adenopathy.   Skin:     General: Skin is warm and dry.      Findings: No rash.   Neurological:      Mental Status: She is alert and oriented to person, place, and time.   Psychiatric:         Behavior: Behavior normal.          Thought Content: Thought content normal.         Judgment: Judgment normal.         Assessment:       1. Fallopian tube cancer, carcinoma, right    2. Screening mammogram, encounter for    3. Colon cancer screening    4. Well woman exam with routine gynecological exam        Plan:   Fallopian tube cancer, carcinoma, right   HIGINIO    RTC in 3 months.   -     ; Future; Expected date: 10/22/2020    Screening mammogram, encounter for  -     Mammo Digital Screening Bilat; Future; Expected date: 07/22/2020    Colon cancer screening  -     Case request GI: COLONOSCOPY    Well woman exam with routine gynecological exam  Counseling time of 10 minutes discussing calcium, vitamin D and exercise. Questions answered.

## 2020-07-22 ENCOUNTER — LAB VISIT (OUTPATIENT)
Dept: LAB | Facility: HOSPITAL | Age: 65
End: 2020-07-22
Attending: OBSTETRICS & GYNECOLOGY
Payer: COMMERCIAL

## 2020-07-22 ENCOUNTER — OFFICE VISIT (OUTPATIENT)
Dept: GYNECOLOGIC ONCOLOGY | Facility: CLINIC | Age: 65
End: 2020-07-22
Payer: COMMERCIAL

## 2020-07-22 VITALS
SYSTOLIC BLOOD PRESSURE: 103 MMHG | WEIGHT: 131.63 LBS | HEART RATE: 73 BPM | BODY MASS INDEX: 21.9 KG/M2 | DIASTOLIC BLOOD PRESSURE: 68 MMHG

## 2020-07-22 DIAGNOSIS — C57.01 FALLOPIAN TUBE CANCER, CARCINOMA, RIGHT: ICD-10-CM

## 2020-07-22 DIAGNOSIS — C57.01 FALLOPIAN TUBE CANCER, CARCINOMA, RIGHT: Primary | ICD-10-CM

## 2020-07-22 DIAGNOSIS — Z01.419 WELL WOMAN EXAM WITH ROUTINE GYNECOLOGICAL EXAM: ICD-10-CM

## 2020-07-22 DIAGNOSIS — Z12.31 SCREENING MAMMOGRAM, ENCOUNTER FOR: ICD-10-CM

## 2020-07-22 DIAGNOSIS — Z12.11 COLON CANCER SCREENING: ICD-10-CM

## 2020-07-22 LAB — CANCER AG125 SERPL-ACNC: 11 U/ML (ref 0–30)

## 2020-07-22 PROCEDURE — 86304 IMMUNOASSAY TUMOR CA 125: CPT

## 2020-07-22 PROCEDURE — 99999 PR PBB SHADOW E&M-EST. PATIENT-LVL III: CPT | Mod: PBBFAC,,, | Performed by: OBSTETRICS & GYNECOLOGY

## 2020-07-22 PROCEDURE — 99999 PR PBB SHADOW E&M-EST. PATIENT-LVL III: ICD-10-PCS | Mod: PBBFAC,,, | Performed by: OBSTETRICS & GYNECOLOGY

## 2020-07-22 PROCEDURE — 3008F PR BODY MASS INDEX (BMI) DOCUMENTED: ICD-10-PCS | Mod: CPTII,S$GLB,, | Performed by: OBSTETRICS & GYNECOLOGY

## 2020-07-22 PROCEDURE — 99396 PR PREVENTIVE VISIT,EST,40-64: ICD-10-PCS | Mod: S$GLB,,, | Performed by: OBSTETRICS & GYNECOLOGY

## 2020-07-22 PROCEDURE — 3008F BODY MASS INDEX DOCD: CPT | Mod: CPTII,S$GLB,, | Performed by: OBSTETRICS & GYNECOLOGY

## 2020-07-22 PROCEDURE — 99396 PREV VISIT EST AGE 40-64: CPT | Mod: S$GLB,,, | Performed by: OBSTETRICS & GYNECOLOGY

## 2020-07-22 PROCEDURE — 36415 COLL VENOUS BLD VENIPUNCTURE: CPT

## 2020-08-07 ENCOUNTER — OFFICE VISIT (OUTPATIENT)
Dept: PRIMARY CARE CLINIC | Facility: CLINIC | Age: 65
End: 2020-08-07
Payer: COMMERCIAL

## 2020-08-07 ENCOUNTER — HOSPITAL ENCOUNTER (OUTPATIENT)
Dept: RADIOLOGY | Facility: HOSPITAL | Age: 65
Discharge: HOME | End: 2020-08-07
Attending: OBSTETRICS & GYNECOLOGY
Payer: COMMERCIAL

## 2020-08-07 VITALS
HEART RATE: 100 BPM | WEIGHT: 137.13 LBS | DIASTOLIC BLOOD PRESSURE: 60 MMHG | TEMPERATURE: 98 F | RESPIRATION RATE: 18 BRPM | OXYGEN SATURATION: 99 % | BODY MASS INDEX: 22.85 KG/M2 | SYSTOLIC BLOOD PRESSURE: 99 MMHG | HEIGHT: 65 IN

## 2020-08-07 DIAGNOSIS — Z11.4 SCREENING FOR HIV (HUMAN IMMUNODEFICIENCY VIRUS): ICD-10-CM

## 2020-08-07 DIAGNOSIS — Z13.820 OSTEOPOROSIS SCREENING: ICD-10-CM

## 2020-08-07 DIAGNOSIS — Z00.00 ROUTINE MEDICAL EXAM: Primary | ICD-10-CM

## 2020-08-07 DIAGNOSIS — Z23 NEED FOR PNEUMOCOCCAL VACCINE: ICD-10-CM

## 2020-08-07 DIAGNOSIS — Z12.31 SCREENING MAMMOGRAM, ENCOUNTER FOR: ICD-10-CM

## 2020-08-07 DIAGNOSIS — D53.9 MACROCYTIC ANEMIA: ICD-10-CM

## 2020-08-07 PROCEDURE — 3008F PR BODY MASS INDEX (BMI) DOCUMENTED: ICD-10-PCS | Mod: CPTII,S$GLB,, | Performed by: INTERNAL MEDICINE

## 2020-08-07 PROCEDURE — 90471 IMMUNIZATION ADMIN: CPT | Mod: S$GLB,,, | Performed by: INTERNAL MEDICINE

## 2020-08-07 PROCEDURE — 99396 PREV VISIT EST AGE 40-64: CPT | Mod: 25,S$GLB,, | Performed by: INTERNAL MEDICINE

## 2020-08-07 PROCEDURE — 90670 PNEUMOCOCCAL CONJUGATE VACCINE 13-VALENT LESS THAN 5YO & GREATER THAN: ICD-10-PCS | Mod: S$GLB,,, | Performed by: INTERNAL MEDICINE

## 2020-08-07 PROCEDURE — 77067 MAMMO DIGITAL SCREENING BILAT WITH TOMOSYNTHESIS_CAD: ICD-10-PCS | Mod: 26,,, | Performed by: RADIOLOGY

## 2020-08-07 PROCEDURE — 77063 BREAST TOMOSYNTHESIS BI: CPT | Mod: 26,,, | Performed by: RADIOLOGY

## 2020-08-07 PROCEDURE — 77067 SCR MAMMO BI INCL CAD: CPT | Mod: TC,PN

## 2020-08-07 PROCEDURE — 99396 PR PREVENTIVE VISIT,EST,40-64: ICD-10-PCS | Mod: 25,S$GLB,, | Performed by: INTERNAL MEDICINE

## 2020-08-07 PROCEDURE — 77063 MAMMO DIGITAL SCREENING BILAT WITH TOMOSYNTHESIS_CAD: ICD-10-PCS | Mod: 26,,, | Performed by: RADIOLOGY

## 2020-08-07 PROCEDURE — 3008F BODY MASS INDEX DOCD: CPT | Mod: CPTII,S$GLB,, | Performed by: INTERNAL MEDICINE

## 2020-08-07 PROCEDURE — 90670 PCV13 VACCINE IM: CPT | Mod: S$GLB,,, | Performed by: INTERNAL MEDICINE

## 2020-08-07 PROCEDURE — 77067 SCR MAMMO BI INCL CAD: CPT | Mod: 26,,, | Performed by: RADIOLOGY

## 2020-08-07 PROCEDURE — 99999 PR PBB SHADOW E&M-EST. PATIENT-LVL IV: ICD-10-PCS | Mod: PBBFAC,,, | Performed by: INTERNAL MEDICINE

## 2020-08-07 PROCEDURE — 90471 PNEUMOCOCCAL CONJUGATE VACCINE 13-VALENT LESS THAN 5YO & GREATER THAN: ICD-10-PCS | Mod: S$GLB,,, | Performed by: INTERNAL MEDICINE

## 2020-08-07 PROCEDURE — 99999 PR PBB SHADOW E&M-EST. PATIENT-LVL IV: CPT | Mod: PBBFAC,,, | Performed by: INTERNAL MEDICINE
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
## 2020-08-07 NOTE — PROGRESS NOTES
Two patient identifiers verified.  Allergies reviewed. PCV 13  IM administered to right deltoid per MD order.  Patient tolerated injection well; no redness, bleeding, or bruising noted to injection site.  Patient instructed to remain in clinic setting for 15 minutes.  Verbalizes understanding.

## 2020-08-11 ENCOUNTER — TELEPHONE (OUTPATIENT)
Dept: SURGERY | Facility: CLINIC | Age: 65
End: 2020-08-11

## 2020-08-11 ENCOUNTER — PATIENT MESSAGE (OUTPATIENT)
Dept: GYNECOLOGIC ONCOLOGY | Facility: CLINIC | Age: 65
End: 2020-08-11

## 2020-08-11 DIAGNOSIS — Z12.39 BREAST CANCER SCREENING, HIGH RISK PATIENT: Primary | ICD-10-CM

## 2020-08-11 NOTE — TELEPHONE ENCOUNTER
Contacted the patient regarding the message below.  Patient is scheduled to be seen on Tuesday 8/25/2020, 8:30 am with Vera Marie PA-C at [facility].  The patient voiced understanding of appointment date, time, and location.  Reminder letter mailed to the patient.         ----- Message from Loulou Lui NP sent at 8/11/2020  9:05 AM CDT -----  Regarding: elevated T/C score  Dr. Gayla Sandoval would like to refer his patient who has an elevated T/C score of 28.06%.  I placed the referral.  Thank you,  Loulou Lui NP  GYN Oncology

## 2020-08-12 ENCOUNTER — LAB VISIT (OUTPATIENT)
Dept: LAB | Facility: HOSPITAL | Age: 65
End: 2020-08-12
Attending: INTERNAL MEDICINE
Payer: COMMERCIAL

## 2020-08-12 DIAGNOSIS — D53.9 MACROCYTIC ANEMIA: ICD-10-CM

## 2020-08-12 DIAGNOSIS — Z11.4 SCREENING FOR HIV (HUMAN IMMUNODEFICIENCY VIRUS): ICD-10-CM

## 2020-08-12 DIAGNOSIS — Z00.00 ROUTINE MEDICAL EXAM: ICD-10-CM

## 2020-08-12 LAB
ALBUMIN SERPL BCP-MCNC: 3.4 G/DL (ref 3.5–5.2)
ALP SERPL-CCNC: 68 U/L (ref 55–135)
ALT SERPL W/O P-5'-P-CCNC: 13 U/L (ref 10–44)
ANION GAP SERPL CALC-SCNC: 7 MMOL/L (ref 8–16)
AST SERPL-CCNC: 19 U/L (ref 10–40)
BASOPHILS # BLD AUTO: 0.02 K/UL (ref 0–0.2)
BASOPHILS NFR BLD: 0.4 % (ref 0–1.9)
BILIRUB SERPL-MCNC: 0.4 MG/DL (ref 0.1–1)
BUN SERPL-MCNC: 15 MG/DL (ref 8–23)
CALCIUM SERPL-MCNC: 9.2 MG/DL (ref 8.7–10.5)
CHLORIDE SERPL-SCNC: 102 MMOL/L (ref 95–110)
CHOLEST SERPL-MCNC: 197 MG/DL (ref 120–199)
CHOLEST/HDLC SERPL: 2.6 {RATIO} (ref 2–5)
CO2 SERPL-SCNC: 31 MMOL/L (ref 23–29)
CREAT SERPL-MCNC: 1.1 MG/DL (ref 0.5–1.4)
DIFFERENTIAL METHOD: ABNORMAL
EOSINOPHIL # BLD AUTO: 0.2 K/UL (ref 0–0.5)
EOSINOPHIL NFR BLD: 4.4 % (ref 0–8)
ERYTHROCYTE [DISTWIDTH] IN BLOOD BY AUTOMATED COUNT: 13.5 % (ref 11.5–14.5)
EST. GFR  (AFRICAN AMERICAN): >60 ML/MIN/1.73 M^2
EST. GFR  (NON AFRICAN AMERICAN): 54 ML/MIN/1.73 M^2
FOLATE SERPL-MCNC: 9.3 NG/ML (ref 4–24)
GLUCOSE SERPL-MCNC: 92 MG/DL (ref 70–110)
HCT VFR BLD AUTO: 36.5 % (ref 37–48.5)
HDLC SERPL-MCNC: 75 MG/DL (ref 40–75)
HDLC SERPL: 38.1 % (ref 20–50)
HGB BLD-MCNC: 11.7 G/DL (ref 12–16)
IMM GRANULOCYTES # BLD AUTO: 0.02 K/UL (ref 0–0.04)
IMM GRANULOCYTES NFR BLD AUTO: 0.4 % (ref 0–0.5)
IRON SERPL-MCNC: 55 UG/DL (ref 30–160)
LDLC SERPL CALC-MCNC: 111 MG/DL (ref 63–159)
LYMPHOCYTES # BLD AUTO: 1.9 K/UL (ref 1–4.8)
LYMPHOCYTES NFR BLD: 40.3 % (ref 18–48)
MCH RBC QN AUTO: 30.4 PG (ref 27–31)
MCHC RBC AUTO-ENTMCNC: 32.1 G/DL (ref 32–36)
MCV RBC AUTO: 95 FL (ref 82–98)
MONOCYTES # BLD AUTO: 0.7 K/UL (ref 0.3–1)
MONOCYTES NFR BLD: 13.6 % (ref 4–15)
NEUTROPHILS # BLD AUTO: 2 K/UL (ref 1.8–7.7)
NEUTROPHILS NFR BLD: 40.9 % (ref 38–73)
NONHDLC SERPL-MCNC: 122 MG/DL
NRBC BLD-RTO: 0 /100 WBC
PLATELET # BLD AUTO: 257 K/UL (ref 150–350)
PMV BLD AUTO: 9.6 FL (ref 9.2–12.9)
POTASSIUM SERPL-SCNC: 3.9 MMOL/L (ref 3.5–5.1)
PROT SERPL-MCNC: 7.2 G/DL (ref 6–8.4)
RBC # BLD AUTO: 3.85 M/UL (ref 4–5.4)
SATURATED IRON: 17 % (ref 20–50)
SODIUM SERPL-SCNC: 140 MMOL/L (ref 136–145)
TOTAL IRON BINDING CAPACITY: 317 UG/DL (ref 250–450)
TRANSFERRIN SERPL-MCNC: 214 MG/DL (ref 200–375)
TRIGL SERPL-MCNC: 55 MG/DL (ref 30–150)
VIT B12 SERPL-MCNC: 391 PG/ML (ref 210–950)
WBC # BLD AUTO: 4.79 K/UL (ref 3.9–12.7)

## 2020-08-12 PROCEDURE — 36415 COLL VENOUS BLD VENIPUNCTURE: CPT

## 2020-08-12 PROCEDURE — 80061 LIPID PANEL: CPT

## 2020-08-12 PROCEDURE — 83540 ASSAY OF IRON: CPT

## 2020-08-12 PROCEDURE — 80053 COMPREHEN METABOLIC PANEL: CPT

## 2020-08-12 PROCEDURE — 82746 ASSAY OF FOLIC ACID SERUM: CPT

## 2020-08-12 PROCEDURE — 86703 HIV-1/HIV-2 1 RESULT ANTBDY: CPT

## 2020-08-12 PROCEDURE — 82607 VITAMIN B-12: CPT

## 2020-08-12 PROCEDURE — 85025 COMPLETE CBC W/AUTO DIFF WBC: CPT

## 2020-08-13 LAB — HIV 1+2 AB+HIV1 P24 AG SERPL QL IA: NEGATIVE

## 2020-08-19 DIAGNOSIS — Z01.84 ANTIBODY RESPONSE EXAMINATION: ICD-10-CM

## 2020-08-23 ENCOUNTER — PATIENT OUTREACH (OUTPATIENT)
Dept: ADMINISTRATIVE | Facility: OTHER | Age: 65
End: 2020-08-23

## 2020-08-23 NOTE — PROGRESS NOTES
LINKS immunization registry updated  Care Everywhere updated  Health Maintenance updated  Chart reviewed for overdue Proactive [facility] ([facility]) health maintenance testing (CRS, Breast Ca, Diabetic Eye Exam)   Orders entered:N/A  Patient has open case request for colonoscopy.

## 2020-08-25 ENCOUNTER — OFFICE VISIT (OUTPATIENT)
Dept: SURGERY | Facility: CLINIC | Age: 65
End: 2020-08-25
Payer: COMMERCIAL

## 2020-08-25 VITALS
BODY MASS INDEX: 23.21 KG/M2 | HEART RATE: 71 BPM | HEIGHT: 64 IN | WEIGHT: 135.94 LBS | SYSTOLIC BLOOD PRESSURE: 106 MMHG | DIASTOLIC BLOOD PRESSURE: 74 MMHG

## 2020-08-25 DIAGNOSIS — Z12.39 SCREENING FOR BREAST CANCER: ICD-10-CM

## 2020-08-25 PROCEDURE — 3008F BODY MASS INDEX DOCD: CPT | Mod: CPTII,S$GLB,, | Performed by: PHYSICIAN ASSISTANT

## 2020-08-25 PROCEDURE — 99999 PR PBB SHADOW E&M-EST. PATIENT-LVL III: ICD-10-PCS | Mod: PBBFAC,,, | Performed by: PHYSICIAN ASSISTANT

## 2020-08-25 PROCEDURE — 3008F PR BODY MASS INDEX (BMI) DOCUMENTED: ICD-10-PCS | Mod: CPTII,S$GLB,, | Performed by: PHYSICIAN ASSISTANT

## 2020-08-25 PROCEDURE — 99203 OFFICE O/P NEW LOW 30 MIN: CPT | Mod: S$GLB,,, | Performed by: PHYSICIAN ASSISTANT

## 2020-08-25 PROCEDURE — 99203 PR OFFICE/OUTPT VISIT, NEW, LEVL III, 30-44 MIN: ICD-10-PCS | Mod: S$GLB,,, | Performed by: PHYSICIAN ASSISTANT

## 2020-08-25 PROCEDURE — 99999 PR PBB SHADOW E&M-EST. PATIENT-LVL III: CPT | Mod: PBBFAC,,, | Performed by: PHYSICIAN ASSISTANT

## 2020-08-25 NOTE — PROGRESS NOTES
[facility]  [facility]  8/25/2020      REFERRING PROVIDER: Loulou Lui, JADE  [address] [place] [address]    SUBJECTIVE:   Ms. Liz Coto is a 65 y.o. female who presents today for breast cancer screening and high risk assessment.    History of Present Illness:  Reports she is doing well and denies issues or concerns with her breasts. She reports that she routinely does self breast exams. She has not noted a change on breast exam. She denies any history of breast biopsies.  She denies any nipple discharge or nipple inversion.  She denies palpating any breast masses bilaterally. Of note, she has a personal history of right fallopian tube cancer for which she finished treatment for in March 2020. She is following with Dr. Shukla for this.    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/2019    Ovarian cancer      Past Surgical History:   Procedure Laterality Date    BILATERAL SALPINGO-OOPHORECTOMY (BSO) N/A 11/4/2019    Procedure: SALPINGO-OOPHORECTOMY, BILATERAL;  Surgeon: Pérez Shukla MD;  Location: [address];  Service: OB/GYN;  Laterality: N/A;    DEBULKING OF TUMOR N/A 11/4/2019    Procedure: DEBULKING, NEOPLASM;  Surgeon: Pérez Shukla MD;  Location: [address];  Service: OB/GYN;  Laterality: N/A;    ECTOPIC PREGNANCY SURGERY      1981. thinks it was the left     LYSIS OF ADHESIONS N/A 11/4/2019    Procedure: LYSIS, ADHESIONS;  Surgeon: Pérez Shukla MD;  Location: [address];  Service: OB/GYN;  Laterality: N/A;  Sigmoid    OMENTECTOMY N/A 11/4/2019    Procedure: OMENTECTOMY;  Surgeon: Pérez Shukla MD;   "[facility];  Service: OB/GYN;  Laterality: N/A;    PARA-AORTIC LYMPHADENECTOMY N/A 11/4/2019    Procedure: LYMPHADENECTOMY, PARA-AORTIC;  Surgeon: Clarence Colmenares MD;  Location: [facility] OR [address] [place] [address];  Service: General;  Laterality: N/A;    TONSILLECTOMY      TOTAL ABDOMINAL HYSTERECTOMY N/A 11/4/2019    Procedure: HYSTERECTOMY, TOTAL, ABDOMINAL;  Surgeon: Pérez Shukla MD;  Location: [facility] OR [address] [place] [address];  Service: OB/GYN;  Laterality: N/A;     Social History     Socioeconomic History    Marital status: Single     Spouse name: Not on file    Number of children: 0    Years of education: Not on file    Highest education level: Not on file   Occupational History    Not on file   Social Needs    Financial resource strain: Not on file    Food insecurity     Worry: Not on file     Inability: Not on file    Transportation needs     Medical: Not on file     Non-medical: Not on file   Tobacco Use    Smoking status: Former Smoker     Packs/day: 0.25     Years: 45.00     Pack years: 11.25     Types: Cigarettes    Smokeless tobacco: Never Used    Tobacco comment: "i quit when i was diagnosed with cancer since Oct 5t   Substance and Sexual Activity    Alcohol use: Not Currently    Drug use: No    Sexual activity: Not Currently     Partners: Male     Birth control/protection: None, Post-menopausal   Lifestyle    Physical activity     Days per week: Not on file     Minutes per session: Not on file    Stress: Not on file   Relationships    Social connections     Talks on phone: Not on file     Gets together: Not on file     Attends Nondenominational service: Not on file     Active member of club or organization: Not on file     Attends meetings of clubs or organizations: Not on file     Relationship status: Not on file   Other Topics Concern    Not on file   Social History Narrative    Not on file     Review of patient's allergies indicates:  No Known Allergies   Family History:   Mother breast cancer " diagnosed age 40, currently 84  MGM breast cancer diagnosed at 51,  age 54 from bc  Maternal 1st cousin (uncle's daughter) diagnosed at 59, currently 59  MA breast cancer diagnosed at 78,  age 79  MA breast cancer diagnosed at 75,  age 78    Genetic testing: yes, negative for clinically significant mutations; positive for VUS  Ashkenazi inheritance: no  OB/Gyn history:    Number of pregnancies & age at first gestation:    Age of menarche & menopause: 18; 50. Also s/p HOLLY with BSO for right fallopian tube cancer   Body mass index is 23.33 kg/m².   Contraceptive Use/ HRT: never   Breastfeeding: n/a   Number of previous biopsies: 0  Tyrer-Cuzick Score: 16.1%, recalculated in clinic today    Review of Systems: Denies any chest pain or shortness of breath.  Denies any fever or chills.  See HPI/ Interval History for other systems reviewed.    OBJECTIVE:   Vitals:    20 0842   BP: 106/74   Pulse: 71        Physical Exam:  General: well appearing female in nad  HEENT: Normocephalic, atraumatic.    Breast: No masses, nodules, or tenderness present bilaterally.  Normal color and contour of right and left breast.  No nipple inversion, retraction, or discharge bilaterally.    Lymph: No palpable adjacent axillary lymph nodes.  No cervical or supraclavicular lymphadenopathy.    Neurologic Exam: A&O x 3.  There are no focal neurologic deficits.    Imaging  20    Result:  Mammo Digital Screening Bilat w/ Alex     History:  Patient is 65 y.o. and is seen for a screening mammogram.     Films Compared:  Prior images (if available) were compared.     Findings:  This procedure was performed using tomosynthesis. Computer-aided detection was utilized in the interpretation of this examination.     The breasts are heterogeneously dense, which may obscure small masses. There is no evidence of suspicious masses, microcalcifications or architectural distortion.     Impression:  No mammographic evidence of  malignancy.     BI-RADS Category 1: Negative     Recommendation:  Routine screening mammogram in 1 year is recommended.     Your estimated lifetime risk of breast cancer (to age 85) based on Tyrer-Cuzick risk assessment model is Tyrer-Cuzick: 28.06 %. According to the American Cancer Society, patients with a lifetime breast cancer risk of 20% or higher might benefit from supplemental screening tests.     ASSESSMENT:  Ms. Liz Coto is a 65 y.o. year old female here for breast cancer screening and high risk assessment.    PLAN:   We discussed that based on TC score, she is not considered high risk. Continue routine annual screening mammograms. Notify of any future breast concerns or issues. Follow up as needed.    Vera Marie PA-C      Surgical Oncology            8/25/2020

## 2020-08-25 NOTE — LETTER
August 25, 2020      Loulou Lui, NP  [address]  [place] [address]           [facility]  [address]  [facility] [address]  Phone: [phone]  Fax: [phone]          Patient: Liz Coto   MR Number: [id number]   YOB: 1955   Date of Visit: 8/25/2020       Dear Loulou Lui:    Thank you for referring Liz Coto to me for evaluation. Attached you will find relevant portions of my assessment and plan of care.    If you have questions, please do not hesitate to call me. I look forward to following Liz Coto along with you.    Sincerely,    Vera Marie PA-C    Enclosure  CC:  No Recipients    If you would like to receive this communication electronically, please contact [email or web address] or [phone] to request more information on Remember [employer] Member Link access.    For providers and/or their staff who would like to refer a patient to [facility], please contact us through our one-stop-shop provider referral line, [facility], at [phone].    If you feel you have received this communication in error or would no longer like to receive these types of communications, please e-mail [email or web address]

## 2020-09-11 DIAGNOSIS — Z12.11 SPECIAL SCREENING FOR MALIGNANT NEOPLASMS, COLON: Primary | ICD-10-CM

## 2020-09-11 DIAGNOSIS — Z01.818 PRE-OP TESTING: ICD-10-CM

## 2020-09-11 RX ORDER — POLYETHYLENE GLYCOL 3350, SODIUM SULFATE ANHYDROUS, SODIUM BICARBONATE, SODIUM CHLORIDE, POTASSIUM CHLORIDE 236; 22.74; 6.74; 5.86; 2.97 G/4L; G/4L; G/4L; G/4L; G/4L
4 POWDER, FOR SOLUTION ORAL ONCE
Qty: 4000 ML | Refills: 0 | Status: SHIPPED | OUTPATIENT
Start: 2020-09-11 | End: 2020-09-11

## 2020-09-18 DIAGNOSIS — Z01.84 ANTIBODY RESPONSE EXAMINATION: ICD-10-CM

## 2020-09-25 ENCOUNTER — PATIENT OUTREACH (OUTPATIENT)
Dept: ADMINISTRATIVE | Facility: HOSPITAL | Age: 65
End: 2020-09-25

## 2020-09-26 ENCOUNTER — LAB VISIT (OUTPATIENT)
Dept: URGENT CARE | Facility: CLINIC | Age: 65
End: 2020-09-26
Payer: COMMERCIAL

## 2020-09-26 DIAGNOSIS — Z01.818 PRE-OP TESTING: ICD-10-CM

## 2020-09-26 PROCEDURE — 99211 PR OFFICE/OUTPT VISIT, EST, LEVL I: ICD-10-PCS | Mod: S$GLB,,, | Performed by: PHYSICIAN ASSISTANT

## 2020-09-26 PROCEDURE — U0003 INFECTIOUS AGENT DETECTION BY NUCLEIC ACID (DNA OR RNA); SEVERE ACUTE RESPIRATORY SYNDROME CORONAVIRUS 2 (SARS-COV-2) (CORONAVIRUS DISEASE [COVID-19]), AMPLIFIED PROBE TECHNIQUE, MAKING USE OF HIGH THROUGHPUT TECHNOLOGIES AS DESCRIBED BY CMS-2020-01-R: HCPCS

## 2020-09-26 PROCEDURE — 99211 OFF/OP EST MAY X REQ PHY/QHP: CPT | Mod: S$GLB,,, | Performed by: PHYSICIAN ASSISTANT

## 2020-09-27 LAB — SARS-COV-2 RNA RESP QL NAA+PROBE: NOT DETECTED

## 2020-09-29 ENCOUNTER — HOSPITAL ENCOUNTER (OUTPATIENT)
Facility: HOSPITAL | Age: 65
Discharge: HOME | End: 2020-09-29
Attending: COLON & RECTAL SURGERY | Admitting: COLON & RECTAL SURGERY
Payer: COMMERCIAL

## 2020-09-29 ENCOUNTER — ANESTHESIA (OUTPATIENT)
Dept: ENDOSCOPY | Facility: HOSPITAL | Age: 65
End: 2020-09-29
Payer: COMMERCIAL

## 2020-09-29 ENCOUNTER — ANESTHESIA EVENT (OUTPATIENT)
Dept: ENDOSCOPY | Facility: HOSPITAL | Age: 65
End: 2020-09-29
Payer: COMMERCIAL

## 2020-09-29 VITALS
HEIGHT: 64 IN | HEART RATE: 78 BPM | SYSTOLIC BLOOD PRESSURE: 103 MMHG | TEMPERATURE: 98 F | BODY MASS INDEX: 22.88 KG/M2 | RESPIRATION RATE: 16 BRPM | DIASTOLIC BLOOD PRESSURE: 74 MMHG | OXYGEN SATURATION: 100 % | WEIGHT: 134 LBS

## 2020-09-29 DIAGNOSIS — Z12.11 SCREEN FOR COLON CANCER: ICD-10-CM

## 2020-09-29 PROCEDURE — 37000009 HC ANESTHESIA EA ADD 15 MINS: Performed by: COLON & RECTAL SURGERY

## 2020-09-29 PROCEDURE — G0105 COLORECTAL SCRN; HI RISK IND: HCPCS | Mod: ,,, | Performed by: COLON & RECTAL SURGERY

## 2020-09-29 PROCEDURE — E9220 PRA ENDO ANESTHESIA: ICD-10-PCS | Mod: ,,, | Performed by: STUDENT IN AN ORGANIZED HEALTH CARE EDUCATION/TRAINING PROGRAM

## 2020-09-29 PROCEDURE — 63600175 PHARM REV CODE 636 W HCPCS: Performed by: STUDENT IN AN ORGANIZED HEALTH CARE EDUCATION/TRAINING PROGRAM

## 2020-09-29 PROCEDURE — G0105 COLORECTAL SCRN; HI RISK IND: ICD-10-PCS | Mod: ,,, | Performed by: COLON & RECTAL SURGERY

## 2020-09-29 PROCEDURE — G0105 COLORECTAL SCRN; HI RISK IND: HCPCS | Performed by: COLON & RECTAL SURGERY

## 2020-09-29 PROCEDURE — E9220 PRA ENDO ANESTHESIA: HCPCS | Mod: ,,, | Performed by: STUDENT IN AN ORGANIZED HEALTH CARE EDUCATION/TRAINING PROGRAM

## 2020-09-29 PROCEDURE — 25000003 PHARM REV CODE 250: Performed by: COLON & RECTAL SURGERY

## 2020-09-29 PROCEDURE — 37000008 HC ANESTHESIA 1ST 15 MINUTES: Performed by: COLON & RECTAL SURGERY

## 2020-09-29 RX ORDER — PROPOFOL 10 MG/ML
VIAL (ML) INTRAVENOUS
Status: DISCONTINUED | OUTPATIENT
Start: 2020-09-29 | End: 2020-09-29

## 2020-09-29 RX ORDER — PROPOFOL 10 MG/ML
VIAL (ML) INTRAVENOUS CONTINUOUS PRN
Status: DISCONTINUED | OUTPATIENT
Start: 2020-09-29 | End: 2020-09-29

## 2020-09-29 RX ORDER — LIDOCAINE HCL/PF 100 MG/5ML
SYRINGE (ML) INTRAVENOUS
Status: DISCONTINUED | OUTPATIENT
Start: 2020-09-29 | End: 2020-09-29

## 2020-09-29 RX ORDER — SODIUM CHLORIDE 9 MG/ML
INJECTION, SOLUTION INTRAVENOUS CONTINUOUS
Status: DISCONTINUED | OUTPATIENT
Start: 2020-09-29 | End: 2020-09-29 | Stop reason: HOSPADM

## 2020-09-29 RX ADMIN — PROPOFOL 70 MG: 10 INJECTION, EMULSION INTRAVENOUS at 08:09

## 2020-09-29 RX ADMIN — SODIUM CHLORIDE: 0.9 INJECTION, SOLUTION INTRAVENOUS at 07:09

## 2020-09-29 RX ADMIN — Medication 60 MG: at 08:09

## 2020-09-29 RX ADMIN — PROPOFOL 150 MCG/KG/MIN: 10 INJECTION, EMULSION INTRAVENOUS at 08:09

## 2020-09-29 NOTE — [employer] PATIENT INSTRUCTIONS
Discharge Summary/Instructions after an Endoscopic Procedure  Patient Name: Liz Coto  Patient MRN: [id number]  Patient YOB: 1955  Tuesday, September 29, 2020  Chucho Fong MD  RESTRICTIONS:  During your procedure today, you received medications for sedation.  These   medications may affect your judgment, balance and coordination.  Therefore,   for 24 hours, you have the following restrictions:   - DO NOT drive a car, operate machinery, make legal/financial decisions,   sign important papers or drink alcohol.    ACTIVITY:  Today: no heavy lifting, straining or running due to procedural   sedation/anesthesia.  The following day: return to full activity including work.  DIET:  Eat and drink normally unless instructed otherwise.     TREATMENT FOR COMMON SIDE EFFECTS:  - Mild abdominal pain, nausea, belching, bloating or excessive gas:  rest,   eat lightly and use a heating pad.  - Sore Throat: treat with throat lozenges and/or gargle with warm salt   water.  - Because air was used during the procedure, expelling large amounts of air   from your rectum or belching is normal.  - If a bowel prep was taken, you may not have a bowel movement for 1-3 days.    This is normal.  SYMPTOMS TO WATCH FOR AND REPORT TO YOUR PHYSICIAN:  1. Abdominal pain or bloating, other than gas cramps.  2. Chest pain.  3. Back pain.  4. Signs of infection such as: chills or fever occurring within 24 hours   after the procedure.  5. Rectal bleeding, which would show as bright red, maroon, or black stools.   (A tablespoon of blood from the rectum is not serious, especially if   hemorrhoids are present.)  6. Vomiting.  7. Weakness or dizziness.  GO DIRECTLY TO THE NEAREST EMERGENCY ROOM IF YOU HAVE ANY OF THE FOLLOWING:      Difficulty breathing              Chills and/or fever over 101 F   Persistent vomiting and/or vomiting blood   Severe abdominal pain   Severe chest pain   Black, tarry stools   Bleeding- more than one  tablespoon   Any other symptom or condition that you feel may need urgent attention  Your doctor recommends these additional instructions:  If any biopsies were taken, your doctors clinic will contact you in 1 to 2   weeks with any results.  - Discharge patient to home (ambulatory).   - Patient has a contact number available for emergencies.  The signs and   symptoms of potential delayed complications were discussed with the   patient.  Return to normal activities tomorrow.  Written discharge   instructions were provided to the patient.   - Resume previous diet.   - Continue present medications.   - Repeat colonoscopy in 5 years for screening purposes.  For questions, problems or results please call your physician - Chucho Fong MD at Work:  [phone].  [facility], EMERGENCY ROOM PHONE NUMBER: [phone]  IF A COMPLICATION OR EMERGENCY SITUATION ARISES AND YOU ARE UNABLE TO REACH   YOUR PHYSICIAN - GO DIRECTLY TO THE EMERGENCY ROOM.  Chucho Fong MD  9/29/2020 8:55:19 AM  This report has been verified and signed electronically.  [employer]

## 2020-09-29 NOTE — H&P
COLONOSCOPY HISTORY AND PE    Procedure : Colonoscopy      INDICATIONS: asymptomatic screening exam and family history of colon polyps      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/2019    Ovarian cancer        Past Surgical History:   Procedure Laterality Date    BILATERAL SALPINGO-OOPHORECTOMY (BSO) N/A 11/4/2019    Procedure: SALPINGO-OOPHORECTOMY, BILATERAL;  Surgeon: Pérez Shukla MD;  Location: [address];  Service: OB/GYN;  Laterality: N/A;    DEBULKING OF TUMOR N/A 11/4/2019    Procedure: DEBULKING, NEOPLASM;  Surgeon: Pérez Shukla MD;  Location: [address];  Service: OB/GYN;  Laterality: N/A;    ECTOPIC PREGNANCY SURGERY      1981. thinks it was the left     LYSIS OF ADHESIONS N/A 11/4/2019    Procedure: LYSIS, ADHESIONS;  Surgeon: Pérez Shukla MD;  Location: [address];  Service: OB/GYN;  Laterality: N/A;  Sigmoid    OMENTECTOMY N/A 11/4/2019    Procedure: OMENTECTOMY;  Surgeon: Pérez Shukla MD;  Location: [address];  Service: OB/GYN;  Laterality: N/A;    PARA-AORTIC LYMPHADENECTOMY N/A 11/4/2019    Procedure: LYMPHADENECTOMY, PARA-AORTIC;  Surgeon: Clarence Colmenares MD;  Location: [address];  Service: General;  Laterality: N/A;    TONSILLECTOMY      TOTAL ABDOMINAL HYSTERECTOMY N/A 11/4/2019    Procedure: HYSTERECTOMY, TOTAL, ABDOMINAL;  Surgeon: Pérez Shukla MD;  Location: [address];  Service: OB/GYN;  Laterality: N/A;       Review of patient's allergies indicates:  No Known Allergies    No current facility-administered medications on file prior to encounter.      No current outpatient medications on file prior to encounter.  "      Family History   Problem Relation Age of Onset    Thyroid disease Mother     Breast cancer Mother 40    Lupus Father     Breast cancer Maternal Grandmother         50s    Prostate cancer Brother     Colon cancer Brother 65    Breast cancer Maternal Cousin     Breast cancer Maternal Aunt     Colon cancer Maternal Grandfather     Uterine cancer Neg Hx     Anesthesia problems Neg Hx        Social History     Socioeconomic History    Marital status: Single     Spouse name: Not on file    Number of children: 0    Years of education: Not on file    Highest education level: Not on file   Occupational History    Not on file   Social Needs    Financial resource strain: Not on file    Food insecurity     Worry: Not on file     Inability: Not on file    Transportation needs     Medical: Not on file     Non-medical: Not on file   Tobacco Use    Smoking status: Former Smoker     Packs/day: 0.25     Years: 45.00     Pack years: 11.25     Types: Cigarettes    Smokeless tobacco: Never Used    Tobacco comment: "i quit when i was diagnosed with cancer since Oct 5t   Substance and Sexual Activity    Alcohol use: Not Currently    Drug use: No    Sexual activity: Not Currently     Partners: Male     Birth control/protection: None, Post-menopausal   Lifestyle    Physical activity     Days per week: Not on file     Minutes per session: Not on file    Stress: Not on file   Relationships    Social connections     Talks on phone: Not on file     Gets together: Not on file     Attends Judaism service: Not on file     Active member of club or organization: Not on file     Attends meetings of clubs or organizations: Not on file     Relationship status: Not on file   Other Topics Concern    Not on file   Social History Narrative    Not on file       Review of Systems -    Respiratory : no cough, shortness of breath, or wheezing  Cardiovascular  no chest pain or dyspnea on exertion  Gastrointestinal no " abdominal pain, change in bowel habits, or black or bloody stools  Musculoskeletal no deformities, swelling  Neurological no TIA or stroke symptoms        Physical Exam:  General: NAD  AT NC EOMI  Mallampati Score   Neck supple, trachea midline  Lungs: nl excursions, no retractions.  Breathing comfortably  Abdomen ND soft NT.  No masses  Extremities: No CCE.      ASA:  II    PLAN  COLONOSCOPY.  The details of the procedure, the possible need for biopsy or polypectomy and the potential risks including bleeding, perforation, missed polyps were discussed in detail.

## 2020-09-29 NOTE — ANESTHESIA POSTPROCEDURE EVALUATION
Anesthesia Post Evaluation    Patient: Liz Coto    Procedure(s) Performed: Procedure(s) (LRB):  COLONOSCOPY (N/A)    Final Anesthesia Type: general    Patient location during evaluation: GI PACU  Patient participation: Yes- Able to Participate  Level of consciousness: awake and alert  Post-procedure vital signs: reviewed and stable  Pain management: adequate  Airway patency: patent    PONV status at discharge: No PONV  Anesthetic complications: no      Cardiovascular status: blood pressure returned to baseline  Respiratory status: unassisted, spontaneous ventilation and room air  Hydration status: euvolemic  Follow-up not needed.          Vitals Value Taken Time   /74 09/29/20 0919   Temp 36.7 °C (98 °F) 09/29/20 0858   Pulse 78 09/29/20 0919   Resp 16 09/29/20 0919   SpO2 100 % 09/29/20 0919         Event Time   Out of Recovery 09:35:06         Pain/Caity Score: No data recorded

## 2020-09-29 NOTE — ANESTHESIA PREPROCEDURE EVALUATION
09/29/2020  Liz Coto is a 65 y.o., female.  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 thinks it was the left     LYSIS OF ADHESIONS N/A 11/4/2019    Procedure: LYSIS, ADHESIONS;  Surgeon: Pérez Shukla MD;  Location: [address];  Service: OB/GYN;  Laterality: N/A;  Sigmoid    OMENTECTOMY N/A 11/4/2019    Procedure: OMENTECTOMY;  Surgeon: Pérez Shukla MD;  Location: [facility] OR [address] [place] [address];  Service: OB/GYN;  Laterality: N/A;    PARA-AORTIC LYMPHADENECTOMY N/A 11/4/2019    Procedure: LYMPHADENECTOMY, PARA-AORTIC;  Surgeon: Clarence Colmenares MD;  Location: [address];  Service: General;  Laterality: N/A;    TONSILLECTOMY      TOTAL ABDOMINAL HYSTERECTOMY N/A 11/4/2019    Procedure: HYSTERECTOMY, TOTAL, ABDOMINAL;  Surgeon: Pérez Shukla MD;  Location: [address];  Service: OB/GYN;  Laterality: N/A;     Patient Active Problem List   Diagnosis    Urticaria    Elevated cancer antigen 125 (CA-125)    S/P abdominal hysterectomy and left salpingo-oophorectomy, omentectomy, LND    Fallopian  tube cancer, carcinoma, right    Chemotherapy-induced nausea    Alopecia    Chemotherapy induced nausea and vomiting    Anemia associated with chemotherapy    Bilateral lower extremity edema       Anesthesia Evaluation    I have reviewed the Patient Summary Reports.   I have reviewed the NPO Status.   I have reviewed the Medications.     Review of Systems  Anesthesia Hx:  No problems with previous Anesthesia    Hematology/Oncology:         -- Cancer in past history:       Physical Exam  General:  Well nourished    Airway/Jaw/Neck:  Airway Findings: Mouth Opening: Normal Tongue: Normal  General Airway Assessment: Adult  Mallampati: II  TM Distance: Normal, at least 6 cm      Dental:  Dental Findings: In tact        Mental Status:  Mental Status Findings:  Cooperative         Anesthesia Plan  Type of Anesthesia, risks & benefits discussed:  Anesthesia Type:  general  Patient's Preference:   Intra-op Monitoring Plan: standard ASA monitors  Intra-op Monitoring Plan Comments:   Post Op Pain Control Plan: per primary service following discharge from PACU  Post Op Pain Control Plan Comments:   Induction:   IV  Beta Blocker:  Patient is not currently on a Beta-Blocker (No further documentation required).       Informed Consent: Patient understands risks and agrees with Anesthesia plan.  Questions answered. Anesthesia consent signed with patient.  ASA Score: 2     Day of Surgery Review of History & Physical: I have interviewed and examined the patient. I have reviewed the patient's H&P dated:  There are no significant changes.          Ready For Surgery From Anesthesia Perspective.

## 2020-09-29 NOTE — TRANSFER OF CARE
"Anesthesia Transfer of Care Note    Patient: Liz Coto    Procedure(s) Performed: Procedure(s) (LRB):  COLONOSCOPY (N/A)    Patient location: GI    Anesthesia Type: general    Transport from OR: Transported from OR on room air with adequate spontaneous ventilation    Post pain: adequate analgesia    Post assessment: no apparent anesthetic complications and tolerated procedure well    Post vital signs: stable    Level of consciousness: awake    Nausea/Vomiting: no nausea/vomiting    Complications: none    Transfer of care protocol was followed      Last vitals:   Visit Vitals  /65   Pulse 100   Temp 36.7 °C (98 °F)   Resp 14   Ht 5' 4" (1.626 m)   Wt 60.8 kg (134 lb)   LMP  (LMP Unknown)   SpO2 99%   Breastfeeding No   BMI 23.00 kg/m²     "

## 2020-10-18 DIAGNOSIS — Z01.84 ANTIBODY RESPONSE EXAMINATION: ICD-10-CM

## 2020-10-21 NOTE — PROGRESS NOTES
Subjective:       Patient ID: Liz Coto is a 65 y.o. female.    Chief Complaint: Fallopian Cancer    HPI     Patient comes in today for follow-up for FIGO stage GLSX8ld grade 3 right fallopian tube adenocarcinoma.      No gyn complaints. Some resolving fatigue.     Oct 2020: : 10.        MM2020  CBE: 2020  Colonsocopy: 2020 normal.     Her oncologic history is:  2019: HOLLY/BSO/small bowel resection/resection of PA sherry mass and omentectomy. Final pathology showed FIGO stage SMIG4up grade 3 right fallopian tube adenocarcinoma with retroperitoneal nodes. 2 omental nodes also found. No gross omental involvement. No residual disease.      Preop  10/2019 was 1097     Dec 2019: started taxol and carboplatin. Allergic reaction to taxol nthus received only carboplatin.  is 20.  2020: changed to taxotere and carboplatin.   Mar 2, 2020: in ED with BLE edema. Stanton CF doppler negative.   2020: cycle 6 of taxotere and carboplatin on 4/15/2020 for  FIGO stage BKUA9mz grade 3 right fallopian tube adenocarcinoma.    Allergic reaction with taxol with cycle 1 thus received only carboplatin. Changed to taxotere for cycle 2. No problems since change to taxotere.    : 1097>20(before C1)>13(p C3)>15(C6)>14.    2020: completed cycle 6 of taxotere and carboplatin on 4/15/2020. : 14. Completion CT AP: HIGINIO.       [facility]: [facility]:   HOXB13 c.853del (p.*285Lysext*97)  MSH3 c.1567G>A (p.Pgd848Fup)  MSH3 c.2511G>A (p.Hem924Uxe)     Review of Systems   Constitutional: Negative for activity change, appetite change and unexpected weight change.   HENT: Negative for mouth sores and tinnitus.    Eyes: Negative for visual disturbance.   Respiratory: Negative for chest tightness and shortness of breath.    Cardiovascular: Negative for chest pain and leg swelling.   Gastrointestinal: Negative for blood in stool, constipation, diarrhea, nausea and vomiting.   Endocrine:  Negative for heat intolerance.   Genitourinary: Negative for dyspareunia, dysuria, pelvic pain, vaginal bleeding, vaginal discharge and vaginal pain.   Musculoskeletal: Negative for back pain and gait problem.   Skin: Negative for rash.   Neurological: Negative for dizziness and weakness.   Hematological: Negative for adenopathy.   Psychiatric/Behavioral: The patient is not nervous/anxious.        Objective:   /68   Pulse 83   Wt 65.6 kg (144 lb 10 oz)   LMP  (LMP Unknown)   BMI 24.82 kg/m²      Physical Exam  Constitutional:       Appearance: She is well-developed.   HENT:      Head: Normocephalic and atraumatic.   Eyes:      General: No scleral icterus.  Neck:      Musculoskeletal: Neck supple.      Thyroid: No thyroid mass or thyromegaly.      Trachea: No tracheal deviation.   Cardiovascular:      Rate and Rhythm: Normal rate and regular rhythm.   Pulmonary:      Effort: Pulmonary effort is normal.      Breath sounds: Normal breath sounds. No wheezing.   Abdominal:      General: There is no distension.      Palpations: Abdomen is soft. There is no mass.      Tenderness: There is no abdominal tenderness. There is no guarding or rebound.   Genitourinary:     Comments: Bimanual exam:  Vulva: no lesions. Normal appearance  Urethra: Normal size and location. No lesions  Bladder: No masses or tenderness.  Vagina: normal mucosa. No lesion  Cervix: absent.   Uterus: absent.  Adnexa: no masses.  Rectovaginal: Not performed    Musculoskeletal:         General: No tenderness.   Lymphadenopathy:      Cervical: No cervical adenopathy.      Upper Body:      Right upper body: No supraclavicular adenopathy.      Left upper body: No supraclavicular adenopathy.   Skin:     General: Skin is warm and dry.   Neurological:      Mental Status: She is alert and oriented to person, place, and time.   Psychiatric:         Behavior: Behavior normal.         Thought Content: Thought content normal.         Judgment: Judgment  normal.         Assessment:       1. Fallopian tube cancer, carcinoma, right        Plan:   Fallopian tube cancer, carcinoma, right   HIGINIO    RTC in 3 months.   -     ; Future; Expected date: 01/22/2021

## 2020-10-22 ENCOUNTER — LAB VISIT (OUTPATIENT)
Dept: LAB | Facility: HOSPITAL | Age: 65
End: 2020-10-22
Payer: COMMERCIAL

## 2020-10-22 ENCOUNTER — HOSPITAL ENCOUNTER (OUTPATIENT)
Dept: RADIOLOGY | Facility: CLINIC | Age: 65
Discharge: HOME | End: 2020-10-22
Attending: INTERNAL MEDICINE
Payer: COMMERCIAL

## 2020-10-22 ENCOUNTER — OFFICE VISIT (OUTPATIENT)
Dept: GYNECOLOGIC ONCOLOGY | Facility: CLINIC | Age: 65
End: 2020-10-22
Payer: COMMERCIAL

## 2020-10-22 VITALS
SYSTOLIC BLOOD PRESSURE: 102 MMHG | BODY MASS INDEX: 24.82 KG/M2 | WEIGHT: 144.63 LBS | DIASTOLIC BLOOD PRESSURE: 68 MMHG | HEART RATE: 83 BPM

## 2020-10-22 DIAGNOSIS — Z13.820 OSTEOPOROSIS SCREENING: ICD-10-CM

## 2020-10-22 DIAGNOSIS — C57.01 FALLOPIAN TUBE CANCER, CARCINOMA, RIGHT: ICD-10-CM

## 2020-10-22 DIAGNOSIS — C57.01 FALLOPIAN TUBE CANCER, CARCINOMA, RIGHT: Primary | ICD-10-CM

## 2020-10-22 LAB — CANCER AG125 SERPL-ACNC: 10 U/ML (ref 0–30)

## 2020-10-22 PROCEDURE — 77080 DXA BONE DENSITY AXIAL: CPT | Mod: TC

## 2020-10-22 PROCEDURE — 3008F PR BODY MASS INDEX (BMI) DOCUMENTED: ICD-10-PCS | Mod: CPTII,S$GLB,, | Performed by: OBSTETRICS & GYNECOLOGY

## 2020-10-22 PROCEDURE — 77080 DXA BONE DENSITY AXIAL: CPT | Mod: 26,,, | Performed by: INTERNAL MEDICINE

## 2020-10-22 PROCEDURE — 86304 IMMUNOASSAY TUMOR CA 125: CPT

## 2020-10-22 PROCEDURE — 99214 PR OFFICE/OUTPT VISIT, EST, LEVL IV, 30-39 MIN: ICD-10-PCS | Mod: S$GLB,,, | Performed by: OBSTETRICS & GYNECOLOGY

## 2020-10-22 PROCEDURE — 99999 PR PBB SHADOW E&M-EST. PATIENT-LVL II: CPT | Mod: PBBFAC,,, | Performed by: OBSTETRICS & GYNECOLOGY

## 2020-10-22 PROCEDURE — 99214 OFFICE O/P EST MOD 30 MIN: CPT | Mod: S$GLB,,, | Performed by: OBSTETRICS & GYNECOLOGY

## 2020-10-22 PROCEDURE — 3008F BODY MASS INDEX DOCD: CPT | Mod: CPTII,S$GLB,, | Performed by: OBSTETRICS & GYNECOLOGY

## 2020-10-22 PROCEDURE — 77080 DEXA BONE DENSITY SPINE HIP: ICD-10-PCS | Mod: 26,,, | Performed by: INTERNAL MEDICINE

## 2020-10-22 PROCEDURE — 36415 COLL VENOUS BLD VENIPUNCTURE: CPT

## 2020-10-22 PROCEDURE — 99999 PR PBB SHADOW E&M-EST. PATIENT-LVL II: ICD-10-PCS | Mod: PBBFAC,,, | Performed by: OBSTETRICS & GYNECOLOGY

## 2020-10-26 ENCOUNTER — PATIENT MESSAGE (OUTPATIENT)
Dept: PRIMARY CARE CLINIC | Facility: CLINIC | Age: 65
End: 2020-10-26

## 2020-11-11 ENCOUNTER — PATIENT MESSAGE (OUTPATIENT)
Dept: PRIMARY CARE CLINIC | Facility: CLINIC | Age: 65
End: 2020-11-11

## 2020-11-17 DIAGNOSIS — Z01.84 ANTIBODY RESPONSE EXAMINATION: ICD-10-CM

## 2021-01-28 ENCOUNTER — OFFICE VISIT (OUTPATIENT)
Dept: GYNECOLOGIC ONCOLOGY | Facility: CLINIC | Age: 66
End: 2021-01-28
Payer: COMMERCIAL

## 2021-01-28 ENCOUNTER — LAB VISIT (OUTPATIENT)
Dept: LAB | Facility: HOSPITAL | Age: 66
End: 2021-01-28
Attending: OBSTETRICS & GYNECOLOGY
Payer: COMMERCIAL

## 2021-01-28 VITALS
SYSTOLIC BLOOD PRESSURE: 107 MMHG | BODY MASS INDEX: 26.09 KG/M2 | WEIGHT: 152 LBS | DIASTOLIC BLOOD PRESSURE: 74 MMHG | HEART RATE: 73 BPM

## 2021-01-28 DIAGNOSIS — C57.01 FALLOPIAN TUBE CANCER, CARCINOMA, RIGHT: Primary | ICD-10-CM

## 2021-01-28 DIAGNOSIS — C57.01 FALLOPIAN TUBE CANCER, CARCINOMA, RIGHT: ICD-10-CM

## 2021-01-28 LAB — CANCER AG125 SERPL-ACNC: 11 U/ML (ref 0–30)

## 2021-01-28 PROCEDURE — 3008F PR BODY MASS INDEX (BMI) DOCUMENTED: ICD-10-PCS | Mod: CPTII,S$GLB,, | Performed by: OBSTETRICS & GYNECOLOGY

## 2021-01-28 PROCEDURE — 36415 COLL VENOUS BLD VENIPUNCTURE: CPT

## 2021-01-28 PROCEDURE — 1126F PR PAIN SEVERITY QUANTIFIED, NO PAIN PRESENT: ICD-10-PCS | Mod: S$GLB,,, | Performed by: OBSTETRICS & GYNECOLOGY

## 2021-01-28 PROCEDURE — 99999 PR PBB SHADOW E&M-EST. PATIENT-LVL II: ICD-10-PCS | Mod: PBBFAC,,, | Performed by: OBSTETRICS & GYNECOLOGY

## 2021-01-28 PROCEDURE — 86304 IMMUNOASSAY TUMOR CA 125: CPT

## 2021-01-28 PROCEDURE — 99213 OFFICE O/P EST LOW 20 MIN: CPT | Mod: S$GLB,,, | Performed by: OBSTETRICS & GYNECOLOGY

## 2021-01-28 PROCEDURE — 99213 PR OFFICE/OUTPT VISIT, EST, LEVL III, 20-29 MIN: ICD-10-PCS | Mod: S$GLB,,, | Performed by: OBSTETRICS & GYNECOLOGY

## 2021-01-28 PROCEDURE — 3008F BODY MASS INDEX DOCD: CPT | Mod: CPTII,S$GLB,, | Performed by: OBSTETRICS & GYNECOLOGY

## 2021-01-28 PROCEDURE — 99999 PR PBB SHADOW E&M-EST. PATIENT-LVL II: CPT | Mod: PBBFAC,,, | Performed by: OBSTETRICS & GYNECOLOGY

## 2021-01-28 PROCEDURE — 1126F AMNT PAIN NOTED NONE PRSNT: CPT | Mod: S$GLB,,, | Performed by: OBSTETRICS & GYNECOLOGY

## 2021-02-05 ENCOUNTER — TELEPHONE (OUTPATIENT)
Dept: PRIMARY CARE CLINIC | Facility: CLINIC | Age: 66
End: 2021-02-05

## 2021-02-05 ENCOUNTER — PATIENT MESSAGE (OUTPATIENT)
Dept: PRIMARY CARE CLINIC | Facility: CLINIC | Age: 66
End: 2021-02-05

## 2021-04-22 ENCOUNTER — TELEPHONE (OUTPATIENT)
Dept: GYNECOLOGIC ONCOLOGY | Facility: CLINIC | Age: 66
End: 2021-04-22

## 2021-04-28 ENCOUNTER — LAB VISIT (OUTPATIENT)
Dept: LAB | Facility: OTHER | Age: 66
End: 2021-04-28
Attending: OBSTETRICS & GYNECOLOGY
Payer: COMMERCIAL

## 2021-04-28 ENCOUNTER — OFFICE VISIT (OUTPATIENT)
Dept: GYNECOLOGIC ONCOLOGY | Facility: CLINIC | Age: 66
End: 2021-04-28
Attending: OBSTETRICS & GYNECOLOGY
Payer: COMMERCIAL

## 2021-04-28 VITALS
WEIGHT: 161.81 LBS | DIASTOLIC BLOOD PRESSURE: 79 MMHG | BODY MASS INDEX: 27.78 KG/M2 | SYSTOLIC BLOOD PRESSURE: 125 MMHG | HEART RATE: 81 BPM

## 2021-04-28 DIAGNOSIS — C57.01 FALLOPIAN TUBE CANCER, CARCINOMA, RIGHT: ICD-10-CM

## 2021-04-28 DIAGNOSIS — Z12.31 SCREENING MAMMOGRAM, ENCOUNTER FOR: ICD-10-CM

## 2021-04-28 DIAGNOSIS — C57.01 FALLOPIAN TUBE CANCER, CARCINOMA, RIGHT: Primary | ICD-10-CM

## 2021-04-28 LAB — CANCER AG125 SERPL-ACNC: 10 U/ML (ref 0–30)

## 2021-04-28 PROCEDURE — 1126F PR PAIN SEVERITY QUANTIFIED, NO PAIN PRESENT: ICD-10-PCS | Mod: S$GLB,,, | Performed by: OBSTETRICS & GYNECOLOGY

## 2021-04-28 PROCEDURE — 1126F AMNT PAIN NOTED NONE PRSNT: CPT | Mod: S$GLB,,, | Performed by: OBSTETRICS & GYNECOLOGY

## 2021-04-28 PROCEDURE — 3008F BODY MASS INDEX DOCD: CPT | Mod: CPTII,S$GLB,, | Performed by: OBSTETRICS & GYNECOLOGY

## 2021-04-28 PROCEDURE — 86304 IMMUNOASSAY TUMOR CA 125: CPT | Performed by: OBSTETRICS & GYNECOLOGY

## 2021-04-28 PROCEDURE — 99999 PR PBB SHADOW E&M-EST. PATIENT-LVL II: ICD-10-PCS | Mod: PBBFAC,,, | Performed by: OBSTETRICS & GYNECOLOGY

## 2021-04-28 PROCEDURE — 99999 PR PBB SHADOW E&M-EST. PATIENT-LVL II: CPT | Mod: PBBFAC,,, | Performed by: OBSTETRICS & GYNECOLOGY

## 2021-04-28 PROCEDURE — 99213 OFFICE O/P EST LOW 20 MIN: CPT | Mod: S$GLB,,, | Performed by: OBSTETRICS & GYNECOLOGY

## 2021-04-28 PROCEDURE — 3008F PR BODY MASS INDEX (BMI) DOCUMENTED: ICD-10-PCS | Mod: CPTII,S$GLB,, | Performed by: OBSTETRICS & GYNECOLOGY

## 2021-04-28 PROCEDURE — 36415 COLL VENOUS BLD VENIPUNCTURE: CPT | Performed by: OBSTETRICS & GYNECOLOGY

## 2021-04-28 PROCEDURE — 99213 PR OFFICE/OUTPT VISIT, EST, LEVL III, 20-29 MIN: ICD-10-PCS | Mod: S$GLB,,, | Performed by: OBSTETRICS & GYNECOLOGY

## 2021-07-28 ENCOUNTER — LAB VISIT (OUTPATIENT)
Dept: LAB | Facility: HOSPITAL | Age: 66
End: 2021-07-28
Attending: OBSTETRICS & GYNECOLOGY
Payer: COMMERCIAL

## 2021-07-28 ENCOUNTER — OFFICE VISIT (OUTPATIENT)
Dept: GYNECOLOGIC ONCOLOGY | Facility: CLINIC | Age: 66
End: 2021-07-28
Payer: COMMERCIAL

## 2021-07-28 VITALS
SYSTOLIC BLOOD PRESSURE: 102 MMHG | BODY MASS INDEX: 28.38 KG/M2 | WEIGHT: 165.38 LBS | HEART RATE: 75 BPM | DIASTOLIC BLOOD PRESSURE: 67 MMHG

## 2021-07-28 DIAGNOSIS — C57.01 FALLOPIAN TUBE CANCER, CARCINOMA, RIGHT: Primary | ICD-10-CM

## 2021-07-28 DIAGNOSIS — Z01.419 WELL WOMAN EXAM WITH ROUTINE GYNECOLOGICAL EXAM: ICD-10-CM

## 2021-07-28 DIAGNOSIS — C57.01 FALLOPIAN TUBE CANCER, CARCINOMA, RIGHT: ICD-10-CM

## 2021-07-28 DIAGNOSIS — Z12.31 SCREENING MAMMOGRAM, ENCOUNTER FOR: ICD-10-CM

## 2021-07-28 LAB — CANCER AG125 SERPL-ACNC: 11 U/ML (ref 0–30)

## 2021-07-28 PROCEDURE — 99999 PR PBB SHADOW E&M-EST. PATIENT-LVL III: CPT | Mod: PBBFAC,,, | Performed by: OBSTETRICS & GYNECOLOGY

## 2021-07-28 PROCEDURE — 86304 IMMUNOASSAY TUMOR CA 125: CPT | Performed by: OBSTETRICS & GYNECOLOGY

## 2021-07-28 PROCEDURE — 1126F PR PAIN SEVERITY QUANTIFIED, NO PAIN PRESENT: ICD-10-PCS | Mod: CPTII,S$GLB,, | Performed by: OBSTETRICS & GYNECOLOGY

## 2021-07-28 PROCEDURE — 3008F PR BODY MASS INDEX (BMI) DOCUMENTED: ICD-10-PCS | Mod: CPTII,S$GLB,, | Performed by: OBSTETRICS & GYNECOLOGY

## 2021-07-28 PROCEDURE — 3008F BODY MASS INDEX DOCD: CPT | Mod: CPTII,S$GLB,, | Performed by: OBSTETRICS & GYNECOLOGY

## 2021-07-28 PROCEDURE — 1160F RVW MEDS BY RX/DR IN RCRD: CPT | Mod: CPTII,S$GLB,, | Performed by: OBSTETRICS & GYNECOLOGY

## 2021-07-28 PROCEDURE — 1159F MED LIST DOCD IN RCRD: CPT | Mod: CPTII,S$GLB,, | Performed by: OBSTETRICS & GYNECOLOGY

## 2021-07-28 PROCEDURE — 99999 PR PBB SHADOW E&M-EST. PATIENT-LVL III: ICD-10-PCS | Mod: PBBFAC,,, | Performed by: OBSTETRICS & GYNECOLOGY

## 2021-07-28 PROCEDURE — 99396 PR PREVENTIVE VISIT,EST,40-64: ICD-10-PCS | Mod: S$GLB,,, | Performed by: OBSTETRICS & GYNECOLOGY

## 2021-07-28 PROCEDURE — 1160F PR REVIEW ALL MEDS BY PRESCRIBER/CLIN PHARMACIST DOCUMENTED: ICD-10-PCS | Mod: CPTII,S$GLB,, | Performed by: OBSTETRICS & GYNECOLOGY

## 2021-07-28 PROCEDURE — 99396 PREV VISIT EST AGE 40-64: CPT | Mod: S$GLB,,, | Performed by: OBSTETRICS & GYNECOLOGY

## 2021-07-28 PROCEDURE — 36415 COLL VENOUS BLD VENIPUNCTURE: CPT | Performed by: OBSTETRICS & GYNECOLOGY

## 2021-07-28 PROCEDURE — 1159F PR MEDICATION LIST DOCUMENTED IN MEDICAL RECORD: ICD-10-PCS | Mod: CPTII,S$GLB,, | Performed by: OBSTETRICS & GYNECOLOGY

## 2021-07-28 PROCEDURE — 1126F AMNT PAIN NOTED NONE PRSNT: CPT | Mod: CPTII,S$GLB,, | Performed by: OBSTETRICS & GYNECOLOGY

## 2021-08-09 ENCOUNTER — HOSPITAL ENCOUNTER (OUTPATIENT)
Dept: RADIOLOGY | Facility: HOSPITAL | Age: 66
Discharge: HOME | End: 2021-08-09
Attending: OBSTETRICS & GYNECOLOGY
Payer: COMMERCIAL

## 2021-08-09 DIAGNOSIS — Z12.31 SCREENING MAMMOGRAM, ENCOUNTER FOR: ICD-10-CM

## 2021-08-09 PROCEDURE — 77063 BREAST TOMOSYNTHESIS BI: CPT | Mod: 26,,, | Performed by: RADIOLOGY

## 2021-08-09 PROCEDURE — 77067 MAMMO DIGITAL SCREENING BILAT WITH TOMO: ICD-10-PCS | Mod: 26,,, | Performed by: RADIOLOGY

## 2021-08-09 PROCEDURE — 77063 MAMMO DIGITAL SCREENING BILAT WITH TOMO: ICD-10-PCS | Mod: 26,,, | Performed by: RADIOLOGY

## 2021-08-09 PROCEDURE — 77067 SCR MAMMO BI INCL CAD: CPT | Mod: 26,,, | Performed by: RADIOLOGY

## 2021-08-09 PROCEDURE — 77067 SCR MAMMO BI INCL CAD: CPT | Mod: TC,PN

## 2021-10-05 ENCOUNTER — PATIENT MESSAGE (OUTPATIENT)
Dept: ADMINISTRATIVE | Facility: HOSPITAL | Age: 66
End: 2021-10-05

## 2021-11-08 ENCOUNTER — OFFICE VISIT (OUTPATIENT)
Dept: PRIMARY CARE CLINIC | Facility: CLINIC | Age: 66
End: 2021-11-08
Payer: COMMERCIAL

## 2021-11-08 VITALS
HEIGHT: 64 IN | SYSTOLIC BLOOD PRESSURE: 120 MMHG | OXYGEN SATURATION: 96 % | HEART RATE: 78 BPM | DIASTOLIC BLOOD PRESSURE: 74 MMHG | BODY MASS INDEX: 28.15 KG/M2 | TEMPERATURE: 98 F | WEIGHT: 164.88 LBS

## 2021-11-08 DIAGNOSIS — M85.80 OSTEOPENIA, UNSPECIFIED LOCATION: ICD-10-CM

## 2021-11-08 DIAGNOSIS — Z71.85 IMMUNIZATION COUNSELING: ICD-10-CM

## 2021-11-08 DIAGNOSIS — Z00.00 ROUTINE MEDICAL EXAM: Primary | ICD-10-CM

## 2021-11-08 DIAGNOSIS — R06.09 EXERTIONAL DYSPNEA: ICD-10-CM

## 2021-11-08 DIAGNOSIS — Z23 NEED FOR PNEUMOCOCCAL VACCINE: ICD-10-CM

## 2021-11-08 PROCEDURE — 1159F PR MEDICATION LIST DOCUMENTED IN MEDICAL RECORD: ICD-10-PCS | Mod: CPTII,S$GLB,, | Performed by: INTERNAL MEDICINE

## 2021-11-08 PROCEDURE — 3008F PR BODY MASS INDEX (BMI) DOCUMENTED: ICD-10-PCS | Mod: CPTII,S$GLB,, | Performed by: INTERNAL MEDICINE

## 2021-11-08 PROCEDURE — 1160F PR REVIEW ALL MEDS BY PRESCRIBER/CLIN PHARMACIST DOCUMENTED: ICD-10-PCS | Mod: CPTII,S$GLB,, | Performed by: INTERNAL MEDICINE

## 2021-11-08 PROCEDURE — 1160F RVW MEDS BY RX/DR IN RCRD: CPT | Mod: CPTII,S$GLB,, | Performed by: INTERNAL MEDICINE

## 2021-11-08 PROCEDURE — 99999 PR PBB SHADOW E&M-EST. PATIENT-LVL III: ICD-10-PCS | Mod: PBBFAC,,, | Performed by: INTERNAL MEDICINE

## 2021-11-08 PROCEDURE — 3074F PR MOST RECENT SYSTOLIC BLOOD PRESSURE < 130 MM HG: ICD-10-PCS | Mod: CPTII,S$GLB,, | Performed by: INTERNAL MEDICINE

## 2021-11-08 PROCEDURE — 93010 ELECTROCARDIOGRAM REPORT: CPT | Mod: S$GLB,,, | Performed by: INTERNAL MEDICINE

## 2021-11-08 PROCEDURE — 3078F DIAST BP <80 MM HG: CPT | Mod: CPTII,S$GLB,, | Performed by: INTERNAL MEDICINE

## 2021-11-08 PROCEDURE — 93010 EKG 12-LEAD: ICD-10-PCS | Mod: S$GLB,,, | Performed by: INTERNAL MEDICINE

## 2021-11-08 PROCEDURE — 1159F MED LIST DOCD IN RCRD: CPT | Mod: CPTII,S$GLB,, | Performed by: INTERNAL MEDICINE

## 2021-11-08 PROCEDURE — 3078F PR MOST RECENT DIASTOLIC BLOOD PRESSURE < 80 MM HG: ICD-10-PCS | Mod: CPTII,S$GLB,, | Performed by: INTERNAL MEDICINE

## 2021-11-08 PROCEDURE — 99396 PREV VISIT EST AGE 40-64: CPT | Mod: S$GLB,,, | Performed by: INTERNAL MEDICINE

## 2021-11-08 PROCEDURE — 99396 PR PREVENTIVE VISIT,EST,40-64: ICD-10-PCS | Mod: S$GLB,,, | Performed by: INTERNAL MEDICINE

## 2021-11-08 PROCEDURE — 3074F SYST BP LT 130 MM HG: CPT | Mod: CPTII,S$GLB,, | Performed by: INTERNAL MEDICINE

## 2021-11-08 PROCEDURE — 93005 ELECTROCARDIOGRAM TRACING: CPT | Mod: S$GLB,,, | Performed by: INTERNAL MEDICINE

## 2021-11-08 PROCEDURE — 93005 EKG 12-LEAD: ICD-10-PCS | Mod: S$GLB,,, | Performed by: INTERNAL MEDICINE

## 2021-11-08 PROCEDURE — 99999 PR PBB SHADOW E&M-EST. PATIENT-LVL III: CPT | Mod: PBBFAC,,, | Performed by: INTERNAL MEDICINE

## 2021-11-08 PROCEDURE — 3008F BODY MASS INDEX DOCD: CPT | Mod: CPTII,S$GLB,, | Performed by: INTERNAL MEDICINE

## 2021-11-08 RX ORDER — METHOCARBAMOL 500 MG/1
TABLET, FILM COATED ORAL
COMMUNITY
Start: 2021-08-06 | End: 2021-11-08

## 2021-11-09 ENCOUNTER — LAB VISIT (OUTPATIENT)
Dept: LAB | Facility: HOSPITAL | Age: 66
End: 2021-11-09
Attending: INTERNAL MEDICINE
Payer: COMMERCIAL

## 2021-11-09 ENCOUNTER — TELEPHONE (OUTPATIENT)
Dept: PRIMARY CARE CLINIC | Facility: CLINIC | Age: 66
End: 2021-11-09
Payer: COMMERCIAL

## 2021-11-09 DIAGNOSIS — Z71.85 IMMUNIZATION COUNSELING: ICD-10-CM

## 2021-11-09 DIAGNOSIS — M85.80 OSTEOPENIA, UNSPECIFIED LOCATION: ICD-10-CM

## 2021-11-09 DIAGNOSIS — Z00.00 ROUTINE MEDICAL EXAM: ICD-10-CM

## 2021-11-09 DIAGNOSIS — R06.09 EXERTIONAL DYSPNEA: ICD-10-CM

## 2021-11-09 LAB
25(OH)D3+25(OH)D2 SERPL-MCNC: 16 NG/ML (ref 30–96)
ALBUMIN SERPL BCP-MCNC: 3.5 G/DL (ref 3.5–5.2)
ALP SERPL-CCNC: 69 U/L (ref 55–135)
ALT SERPL W/O P-5'-P-CCNC: 14 U/L (ref 10–44)
ANION GAP SERPL CALC-SCNC: 6 MMOL/L (ref 8–16)
AST SERPL-CCNC: 17 U/L (ref 10–40)
BILIRUB SERPL-MCNC: 0.6 MG/DL (ref 0.1–1)
BNP SERPL-MCNC: 74 PG/ML (ref 0–99)
BUN SERPL-MCNC: 11 MG/DL (ref 8–23)
CALCIUM SERPL-MCNC: 10 MG/DL (ref 8.7–10.5)
CHLORIDE SERPL-SCNC: 105 MMOL/L (ref 95–110)
CHOLEST SERPL-MCNC: 224 MG/DL (ref 120–199)
CHOLEST/HDLC SERPL: 3.6 {RATIO} (ref 2–5)
CO2 SERPL-SCNC: 29 MMOL/L (ref 23–29)
CREAT SERPL-MCNC: 1.1 MG/DL (ref 0.5–1.4)
ERYTHROCYTE [DISTWIDTH] IN BLOOD BY AUTOMATED COUNT: 13.4 % (ref 11.5–14.5)
EST. GFR  (AFRICAN AMERICAN): >60 ML/MIN/1.73 M^2
EST. GFR  (NON AFRICAN AMERICAN): 53 ML/MIN/1.73 M^2
GLUCOSE SERPL-MCNC: 86 MG/DL (ref 70–110)
HCT VFR BLD AUTO: 40.3 % (ref 37–48.5)
HDLC SERPL-MCNC: 63 MG/DL (ref 40–75)
HDLC SERPL: 28.1 % (ref 20–50)
HGB BLD-MCNC: 13.1 G/DL (ref 12–16)
LDLC SERPL CALC-MCNC: 145.2 MG/DL (ref 63–159)
MCH RBC QN AUTO: 30.7 PG (ref 27–31)
MCHC RBC AUTO-ENTMCNC: 32.5 G/DL (ref 32–36)
MCV RBC AUTO: 94 FL (ref 82–98)
NONHDLC SERPL-MCNC: 161 MG/DL
PLATELET # BLD AUTO: 295 K/UL (ref 150–450)
PMV BLD AUTO: 9.7 FL (ref 9.2–12.9)
POTASSIUM SERPL-SCNC: 3.9 MMOL/L (ref 3.5–5.1)
PROT SERPL-MCNC: 7.3 G/DL (ref 6–8.4)
RBC # BLD AUTO: 4.27 M/UL (ref 4–5.4)
SODIUM SERPL-SCNC: 140 MMOL/L (ref 136–145)
TRIGL SERPL-MCNC: 79 MG/DL (ref 30–150)
WBC # BLD AUTO: 5.33 K/UL (ref 3.9–12.7)

## 2021-11-09 PROCEDURE — 80061 LIPID PANEL: CPT | Performed by: INTERNAL MEDICINE

## 2021-11-09 PROCEDURE — 86787 VARICELLA-ZOSTER ANTIBODY: CPT | Performed by: INTERNAL MEDICINE

## 2021-11-09 PROCEDURE — 80053 COMPREHEN METABOLIC PANEL: CPT | Performed by: INTERNAL MEDICINE

## 2021-11-09 PROCEDURE — 36415 COLL VENOUS BLD VENIPUNCTURE: CPT | Performed by: INTERNAL MEDICINE

## 2021-11-09 PROCEDURE — 82306 VITAMIN D 25 HYDROXY: CPT | Performed by: INTERNAL MEDICINE

## 2021-11-09 PROCEDURE — 85027 COMPLETE CBC AUTOMATED: CPT | Performed by: INTERNAL MEDICINE

## 2021-11-09 PROCEDURE — 83880 ASSAY OF NATRIURETIC PEPTIDE: CPT | Performed by: INTERNAL MEDICINE

## 2021-11-11 LAB
VARICELLA INTERPRETATION: POSITIVE
VARICELLA ZOSTER IGG: 1.64 ISR (ref 0–0.9)

## 2021-11-12 ENCOUNTER — PATIENT MESSAGE (OUTPATIENT)
Dept: PRIMARY CARE CLINIC | Facility: CLINIC | Age: 66
End: 2021-11-12
Payer: COMMERCIAL

## 2021-11-12 ENCOUNTER — HOSPITAL ENCOUNTER (OUTPATIENT)
Dept: RADIOLOGY | Facility: HOSPITAL | Age: 66
Discharge: HOME | End: 2021-11-12
Attending: INTERNAL MEDICINE
Payer: COMMERCIAL

## 2021-11-12 DIAGNOSIS — E55.9 VITAMIN D DEFICIENCY: Primary | ICD-10-CM

## 2021-11-12 DIAGNOSIS — R06.09 EXERTIONAL DYSPNEA: ICD-10-CM

## 2021-11-12 PROCEDURE — 71046 X-RAY EXAM CHEST 2 VIEWS: CPT | Mod: TC,FY

## 2021-11-12 PROCEDURE — 71046 XR CHEST PA AND LATERAL: ICD-10-PCS | Mod: 26,,, | Performed by: RADIOLOGY

## 2021-11-12 PROCEDURE — 71046 X-RAY EXAM CHEST 2 VIEWS: CPT | Mod: 26,,, | Performed by: RADIOLOGY

## 2021-11-12 RX ORDER — ERGOCALCIFEROL 1.25 MG/1
50000 CAPSULE ORAL
Qty: 12 CAPSULE | Refills: 1 | Status: SHIPPED | OUTPATIENT
Start: 2021-11-12 | End: 2022-07-25 | Stop reason: SDUPTHER

## 2021-11-21 ENCOUNTER — PATIENT MESSAGE (OUTPATIENT)
Dept: PRIMARY CARE CLINIC | Facility: CLINIC | Age: 66
End: 2021-11-21
Payer: COMMERCIAL

## 2021-11-21 DIAGNOSIS — R06.09 EXERTIONAL DYSPNEA: Primary | ICD-10-CM

## 2021-11-21 DIAGNOSIS — Z11.52 ENCOUNTER FOR SCREENING LABORATORY TESTING FOR COVID-19 VIRUS IN ASYMPTOMATIC PATIENT: ICD-10-CM

## 2021-11-21 DIAGNOSIS — Z01.812 ENCOUNTER FOR SCREENING LABORATORY TESTING FOR COVID-19 VIRUS IN ASYMPTOMATIC PATIENT: ICD-10-CM

## 2021-11-29 ENCOUNTER — PATIENT MESSAGE (OUTPATIENT)
Dept: PRIMARY CARE CLINIC | Facility: CLINIC | Age: 66
End: 2021-11-29
Payer: COMMERCIAL

## 2021-11-29 ENCOUNTER — HOSPITAL ENCOUNTER (OUTPATIENT)
Dept: PULMONOLOGY | Facility: CLINIC | Age: 66
Discharge: HOME | End: 2021-11-29
Payer: COMMERCIAL

## 2021-11-29 DIAGNOSIS — R06.09 EXERTIONAL DYSPNEA: ICD-10-CM

## 2021-11-29 LAB
FEF 25 75 LLN: 0.77
FEF 25 75 PRE REF: 83.9 %
FEF 25 75 REF: 1.89
FET100 CHG: 0.3 %
FEV05 LLN: 0.98
FEV05 REF: 1.83
FEV1 CHG: 1.6 %
FEV1 FVC LLN: 67
FEV1 FVC PRE REF: 93.5 %
FEV1 FVC REF: 79
FEV1 LLN: 1.52
FEV1 PRE REF: 102.4 %
FEV1 REF: 2.12
FEV1 VOL CHG: 0.03
FVC CHG: -2.4 %
FVC LLN: 1.95
FVC PRE REF: 108.9 %
FVC REF: 2.69
FVC VOL CHG: -0.07
PEF LLN: 3.42
PEF PRE REF: 102.5 %
PEF REF: 5.49
PHYSICIAN COMMENT: NORMAL
POST FEF 25 75: 1.91 L/S (ref 0.77–3.52)
POST FET 100: 7.96 SEC
POST FEV1 FVC: 77.02 % (ref 66.98–89.59)
POST FEV1: 2.2 L (ref 1.52–2.69)
POST FEV5: 1.79 L (ref 0.98–2.69)
POST FVC: 2.86 L (ref 1.95–3.46)
POST PEF: 5.42 L/S (ref 3.42–7.55)
PRE FEF 25 75: 1.58 L/S (ref 0.77–3.52)
PRE FET 100: 7.94 SEC
PRE FEV05 REF: 94.2 %
PRE FEV1 FVC: 74 % (ref 66.98–89.59)
PRE FEV1: 2.17 L (ref 1.52–2.69)
PRE FEV5: 1.73 L (ref 0.98–2.69)
PRE FVC: 2.93 L (ref 1.95–3.46)
PRE PEF: 5.62 L/S (ref 3.42–7.55)

## 2021-11-29 PROCEDURE — 94060 PR EVAL OF BRONCHOSPASM: ICD-10-PCS | Mod: S$GLB,,, | Performed by: INTERNAL MEDICINE

## 2021-11-29 PROCEDURE — 94060 EVALUATION OF WHEEZING: CPT | Mod: S$GLB,,, | Performed by: INTERNAL MEDICINE

## 2021-12-08 ENCOUNTER — HOSPITAL ENCOUNTER (OUTPATIENT)
Dept: CARDIOLOGY | Facility: HOSPITAL | Age: 66
Discharge: HOME | End: 2021-12-08
Attending: INTERNAL MEDICINE
Payer: COMMERCIAL

## 2021-12-08 DIAGNOSIS — R06.09 EXERTIONAL DYSPNEA: ICD-10-CM

## 2021-12-08 LAB
AORTIC ROOT ANNULUS: 2.4 CM
AORTIC VALVE CUSP SEPERATION: 1.92 CM
ASCENDING AORTA: 2.81 CM
AV INDEX (PROSTH): 1.09
AV MEAN GRADIENT: 4 MMHG
AV PEAK GRADIENT: 7 MMHG
AV VALVE AREA: 2.62 CM2
AV VELOCITY RATIO: 1
CV ECHO LV RWT: 0.59 CM
CV STRESS BASE HR: 70 BPM
DIASTOLIC BLOOD PRESSURE: 77 MMHG
DOP CALC AO PEAK VEL: 1.34 M/S
DOP CALC AO VTI: 27.41 CM
DOP CALC LVOT AREA: 2.4 CM2
DOP CALC LVOT DIAMETER: 1.75 CM
DOP CALC LVOT PEAK VEL: 1.34 M/S
DOP CALC LVOT STROKE VOLUME: 71.88 CM3
DOP CALCLVOT PEAK VEL VTI: 29.9 CM
E WAVE DECELERATION TIME: 295.52 MSEC
E/A RATIO: 0.83
E/E' RATIO: 3.62 M/S
ECHO LV POSTERIOR WALL: 0.9 CM (ref 0.6–1.1)
EJECTION FRACTION: 65 %
FRACTIONAL SHORTENING: 36 % (ref 28–44)
INTERVENTRICULAR SEPTUM: 1.08 CM (ref 0.6–1.1)
IVRT: 115.34 MSEC
LA MAJOR: 4.69 CM
LA MINOR: 4.14 CM
LA WIDTH: 3.46 CM
LEFT ATRIUM SIZE: 3.27 CM
LEFT ATRIUM VOLUME: 42.29 CM3
LEFT INTERNAL DIMENSION IN SYSTOLE: 1.93 CM (ref 2.1–4)
LEFT VENTRICLE DIASTOLIC VOLUME: 35.91 ML
LEFT VENTRICLE SYSTOLIC VOLUME: 11.59 ML
LEFT VENTRICULAR INTERNAL DIMENSION IN DIASTOLE: 3.03 CM (ref 3.5–6)
LEFT VENTRICULAR MASS: 82.08 G
LV LATERAL E/E' RATIO: 2.92 M/S
LV SEPTAL E/E' RATIO: 4.75 M/S
MV PEAK A VEL: 0.46 M/S
MV PEAK E VEL: 0.38 M/S
MV STENOSIS PRESSURE HALF TIME: 85.7 MS
MV VALVE AREA P 1/2 METHOD: 2.57 CM2
OHS CV CPX 1 MINUTE RECOVERY HEART RATE: 114 BPM
OHS CV CPX 85 PERCENT MAX PREDICTED HEART RATE MALE: 127
OHS CV CPX ESTIMATED METS: 8
OHS CV CPX MAX PREDICTED HEART RATE: 150
OHS CV CPX PATIENT IS FEMALE: 1
OHS CV CPX PATIENT IS MALE: 0
OHS CV CPX PEAK DIASTOLIC BLOOD PRESSURE: 93 MMHG
OHS CV CPX PEAK HEAR RATE: 144 BPM
OHS CV CPX PEAK RATE PRESSURE PRODUCT: ABNORMAL
OHS CV CPX PEAK SYSTOLIC BLOOD PRESSURE: 143 MMHG
OHS CV CPX PERCENT MAX PREDICTED HEART RATE ACHIEVED: 96
OHS CV CPX RATE PRESSURE PRODUCT PRESENTING: 9800
PISA TR MAX VEL: 2.43 M/S
PV PEAK VELOCITY: 0.67 CM/S
RA MAJOR: 4.66 CM
RA PRESSURE: 3 MMHG
RA WIDTH: 2.91 CM
RIGHT VENTRICULAR END-DIASTOLIC DIMENSION: 2.26 CM
RV TISSUE DOPPLER FREE WALL SYSTOLIC VELOCITY 1 (APICAL 4 CHAMBER VIEW): 8.74 CM/S
SINUS: 2.95 CM
STJ: 2.4 CM
STRESS ECHO POST EXERCISE DUR MIN: 6 MINUTES
STRESS ECHO POST EXERCISE DUR SEC: 54 SECONDS
SYSTOLIC BLOOD PRESSURE: 140 MMHG
TDI LATERAL: 0.13 M/S
TDI SEPTAL: 0.08 M/S
TDI: 0.11 M/S
TR MAX PG: 24 MMHG
TRICUSPID ANNULAR PLANE SYSTOLIC EXCURSION: 1.49 CM
TV REST PULMONARY ARTERY PRESSURE: 27 MMHG

## 2021-12-08 PROCEDURE — 93325 STRESS ECHO (CUPID ONLY): ICD-10-PCS | Mod: 26,,, | Performed by: INTERNAL MEDICINE

## 2021-12-08 PROCEDURE — 93320 STRESS ECHO (CUPID ONLY): ICD-10-PCS | Mod: 26,,, | Performed by: INTERNAL MEDICINE

## 2021-12-08 PROCEDURE — 93351 STRESS TTE COMPLETE: CPT | Mod: 26,,, | Performed by: INTERNAL MEDICINE

## 2021-12-08 PROCEDURE — 93320 DOPPLER ECHO COMPLETE: CPT | Mod: 26,,, | Performed by: INTERNAL MEDICINE

## 2021-12-08 PROCEDURE — 93325 DOPPLER ECHO COLOR FLOW MAPG: CPT

## 2021-12-08 PROCEDURE — 93351 STRESS ECHO (CUPID ONLY): ICD-10-PCS | Mod: 26,,, | Performed by: INTERNAL MEDICINE

## 2021-12-08 PROCEDURE — 93325 DOPPLER ECHO COLOR FLOW MAPG: CPT | Mod: 26,,, | Performed by: INTERNAL MEDICINE

## 2021-12-09 ENCOUNTER — TELEPHONE (OUTPATIENT)
Dept: PRIMARY CARE CLINIC | Facility: CLINIC | Age: 66
End: 2021-12-09
Payer: COMMERCIAL

## 2022-08-10 ENCOUNTER — HOSPITAL ENCOUNTER (OUTPATIENT)
Dept: RADIOLOGY | Facility: HOSPITAL | Age: 67
Discharge: HOME | End: 2022-08-10
Attending: OBSTETRICS & GYNECOLOGY
Payer: COMMERCIAL

## 2022-08-10 VITALS — HEIGHT: 64 IN | BODY MASS INDEX: 28.15 KG/M2 | WEIGHT: 164.88 LBS

## 2022-08-10 DIAGNOSIS — Z12.31 SCREENING MAMMOGRAM, ENCOUNTER FOR: ICD-10-CM

## 2022-08-10 PROCEDURE — 77063 BREAST TOMOSYNTHESIS BI: CPT | Mod: TC

## 2022-08-10 PROCEDURE — 77067 SCR MAMMO BI INCL CAD: CPT | Mod: 26,,, | Performed by: RADIOLOGY

## 2022-08-10 PROCEDURE — 77067 MAMMO DIGITAL SCREENING BILAT WITH TOMO: ICD-10-PCS | Mod: 26,,, | Performed by: RADIOLOGY

## 2022-08-10 PROCEDURE — 77063 BREAST TOMOSYNTHESIS BI: CPT | Mod: 26,,, | Performed by: RADIOLOGY

## 2022-08-10 PROCEDURE — 77063 MAMMO DIGITAL SCREENING BILAT WITH TOMO: ICD-10-PCS | Mod: 26,,, | Performed by: RADIOLOGY

## 2022-08-10 PROCEDURE — 77067 SCR MAMMO BI INCL CAD: CPT | Mod: TC

## 2022-08-12 ENCOUNTER — PATIENT MESSAGE (OUTPATIENT)
Dept: GYNECOLOGIC ONCOLOGY | Facility: CLINIC | Age: 67
End: 2022-08-12
Payer: COMMERCIAL

## 2022-08-12 DIAGNOSIS — C57.01 FALLOPIAN TUBE CANCER, CARCINOMA, RIGHT: Primary | ICD-10-CM

## 2022-08-15 ENCOUNTER — TELEPHONE (OUTPATIENT)
Dept: GYNECOLOGIC ONCOLOGY | Facility: CLINIC | Age: 67
End: 2022-08-15
Payer: COMMERCIAL

## 2022-08-15 NOTE — TELEPHONE ENCOUNTER
Spoke with our patient about her insurance, schedule appointment she voiced understanding of the date, time and location. All questions answered appointment mail. Provider Scheduling Coord.  Gynecologic Oncology MA/PAR /Preceptor Deven Martinez

## 2022-08-15 NOTE — TELEPHONE ENCOUNTER
----- Message from Loulou Lui NP sent at 8/12/2022  4:39 PM CDT -----  Regarding: appt  Deven  Ms. Coto is a good bit overdue for follow up. Dr. Shukla patient last seen a year ago but should be every 3 months.  I've messaged her but you can you please call her to schedule a lab appt for  and follow up with one of the docs?  She does need to continue to be seen by us, not ob gyn.  Thank you

## 2022-08-22 ENCOUNTER — LAB VISIT (OUTPATIENT)
Dept: LAB | Facility: HOSPITAL | Age: 67
End: 2022-08-22
Payer: COMMERCIAL

## 2022-08-22 ENCOUNTER — OFFICE VISIT (OUTPATIENT)
Dept: GYNECOLOGIC ONCOLOGY | Facility: CLINIC | Age: 67
End: 2022-08-22
Payer: COMMERCIAL

## 2022-08-22 VITALS
DIASTOLIC BLOOD PRESSURE: 61 MMHG | SYSTOLIC BLOOD PRESSURE: 108 MMHG | HEART RATE: 70 BPM | BODY MASS INDEX: 27.74 KG/M2 | WEIGHT: 161.63 LBS

## 2022-08-22 DIAGNOSIS — C57.01 FALLOPIAN TUBE CANCER, CARCINOMA, RIGHT: Primary | ICD-10-CM

## 2022-08-22 DIAGNOSIS — C57.01 FALLOPIAN TUBE CANCER, CARCINOMA, RIGHT: ICD-10-CM

## 2022-08-22 LAB — CANCER AG125 SERPL-ACNC: 10 U/ML (ref 0–30)

## 2022-08-22 PROCEDURE — 3008F PR BODY MASS INDEX (BMI) DOCUMENTED: ICD-10-PCS | Mod: CPTII,S$GLB,, | Performed by: OBSTETRICS & GYNECOLOGY

## 2022-08-22 PROCEDURE — 3288F PR FALLS RISK ASSESSMENT DOCUMENTED: ICD-10-PCS | Mod: CPTII,S$GLB,, | Performed by: OBSTETRICS & GYNECOLOGY

## 2022-08-22 PROCEDURE — 1101F PR PT FALLS ASSESS DOC 0-1 FALLS W/OUT INJ PAST YR: ICD-10-PCS | Mod: CPTII,S$GLB,, | Performed by: OBSTETRICS & GYNECOLOGY

## 2022-08-22 PROCEDURE — 99214 PR OFFICE/OUTPT VISIT, EST, LEVL IV, 30-39 MIN: ICD-10-PCS | Mod: S$GLB,,, | Performed by: OBSTETRICS & GYNECOLOGY

## 2022-08-22 PROCEDURE — 3078F DIAST BP <80 MM HG: CPT | Mod: CPTII,S$GLB,, | Performed by: OBSTETRICS & GYNECOLOGY

## 2022-08-22 PROCEDURE — 36415 COLL VENOUS BLD VENIPUNCTURE: CPT | Performed by: NURSE PRACTITIONER

## 2022-08-22 PROCEDURE — 1159F MED LIST DOCD IN RCRD: CPT | Mod: CPTII,S$GLB,, | Performed by: OBSTETRICS & GYNECOLOGY

## 2022-08-22 PROCEDURE — 86304 IMMUNOASSAY TUMOR CA 125: CPT | Performed by: NURSE PRACTITIONER

## 2022-08-22 PROCEDURE — 3008F BODY MASS INDEX DOCD: CPT | Mod: CPTII,S$GLB,, | Performed by: OBSTETRICS & GYNECOLOGY

## 2022-08-22 PROCEDURE — 1101F PT FALLS ASSESS-DOCD LE1/YR: CPT | Mod: CPTII,S$GLB,, | Performed by: OBSTETRICS & GYNECOLOGY

## 2022-08-22 PROCEDURE — 3288F FALL RISK ASSESSMENT DOCD: CPT | Mod: CPTII,S$GLB,, | Performed by: OBSTETRICS & GYNECOLOGY

## 2022-08-22 PROCEDURE — 3078F PR MOST RECENT DIASTOLIC BLOOD PRESSURE < 80 MM HG: ICD-10-PCS | Mod: CPTII,S$GLB,, | Performed by: OBSTETRICS & GYNECOLOGY

## 2022-08-22 PROCEDURE — 1159F PR MEDICATION LIST DOCUMENTED IN MEDICAL RECORD: ICD-10-PCS | Mod: CPTII,S$GLB,, | Performed by: OBSTETRICS & GYNECOLOGY

## 2022-08-22 PROCEDURE — 99999 PR PBB SHADOW E&M-EST. PATIENT-LVL III: ICD-10-PCS | Mod: PBBFAC,,, | Performed by: OBSTETRICS & GYNECOLOGY

## 2022-08-22 PROCEDURE — 3074F PR MOST RECENT SYSTOLIC BLOOD PRESSURE < 130 MM HG: ICD-10-PCS | Mod: CPTII,S$GLB,, | Performed by: OBSTETRICS & GYNECOLOGY

## 2022-08-22 PROCEDURE — 3074F SYST BP LT 130 MM HG: CPT | Mod: CPTII,S$GLB,, | Performed by: OBSTETRICS & GYNECOLOGY

## 2022-08-22 PROCEDURE — 99999 PR PBB SHADOW E&M-EST. PATIENT-LVL III: CPT | Mod: PBBFAC,,, | Performed by: OBSTETRICS & GYNECOLOGY

## 2022-08-22 PROCEDURE — 99214 OFFICE O/P EST MOD 30 MIN: CPT | Mod: S$GLB,,, | Performed by: OBSTETRICS & GYNECOLOGY

## 2022-08-22 NOTE — PROGRESS NOTES
"REFERRING PROVIDER  Pérez Shukla MD    INTERVAL HISTORY  CC: stage FYKR1ct grade 3 right fallopian tube adenocarcinoma.   Liz Coto is a 67 y.o. with h/o stage WQJP4xf high grade serous right fallopian tube cancer s/p staging and debulking 11/4/2019 and adjuvant therapy 4/15/2020 with Dr. Shukla.  I am assuming her surveillance care with [facility]. Shukla[facility] jail.     She has no vaginal bleeding or discharge.  She is having no nausea or vomiting.  She has no bowel or bladder complaints, though her bowel movements are not quite as regular as they used to be.  She has no pain issues.  She is experiencing "more gas" both flatulence and belching in the last 3 weeks.  This is new, but she thinks it may be because she just started drinking from straws.  She feels good and has no other concerns.     HPI or ONCOLOGIC HISTORY  10/8/2019  1097    11/4/2019 HOLLY/BSO/small bowel resection/resection of PA sherry mass and omentectomy.   PATHOLOGY:  FIGO stage BKAF9nr grade 3 right fallopian tube adenocarcinoma with retroperitoneal nodes. 2 omental nodes also found. No gross omental involvement. No residual disease.      12/11/2019 - 4/15/2020 Carbo Taxotere x 6 (First cycle single agent carbo due to taxol reaction)   : 1097>20(before C1)>13(p C3)>15(C6)>14.        [facility]: VUS:   HOXB13 c.853del (p.*285Lysext*97)  MSH3 c.1567G>A (p.Otj530Lmf)  MSH3 c.2511G>A (p.Evi582Avv)    8/10/2022 Mammo - BI-RADS1     REVIEW OF SYSTEMS  Review of Systems   Constitutional: Negative for appetite change, chills, fatigue, fever and unexpected weight change.   HENT:   Negative for lump/mass and mouth sores.    Respiratory: Negative for chest tightness, cough and shortness of breath.    Cardiovascular: Negative for chest pain, leg swelling and palpitations.   Gastrointestinal: Negative for abdominal distention, abdominal pain, blood in stool, constipation, diarrhea, nausea and vomiting.   Genitourinary: Negative for " dysuria, frequency, vaginal bleeding and vaginal discharge.    Musculoskeletal: Negative for arthralgias and myalgias.   Skin: Negative for rash.   Neurological: Negative for dizziness, light-headedness and numbness.   Hematological: Negative for adenopathy.   Psychiatric/Behavioral: Negative for decreased concentration, depression and sleep disturbance. The patient is not nervous/anxious.        OBJECTIVE   Vitals:    08/22/22 1012   BP: 108/61   Pulse: 70      Body mass index is 27.74 kg/m².     Physical Exam:   Constitutional: She is oriented to person, place, and time. She appears well-developed and well-nourished. No distress.    HENT:   Head: Normocephalic and atraumatic.    Eyes: Conjunctivae and EOM are normal.      Pulmonary/Chest: Effort normal. No respiratory distress.        Abdominal: Soft. She exhibits no distension and no mass. There is no abdominal tenderness. There is no rebound and no guarding. No hernia.     Genitourinary:    Genitourinary Comments: Vulva - normal  Vagina - no lesions, vaginal cuff well healed  Cervix - surgically absent  Uterus - surgically absent  Adnexa - no masses  RV - no masses, confirms above                 Neurological: She is alert and oriented to person, place, and time.    Skin: No rash noted.    Psychiatric: She has a normal mood and affect. Judgment and thought content normal.     ECOG status: 0    LABORATORY DATA  Lab data reviewed.    RADIOLOGICAL DATA  Radiology data reviewed.    PATHOLOGY DATA  Pathology data reviewed.    ASSESSMENT    1. Fallopian tube cancer, carcinoma, right     The patient was seen, examined, and counseled by me.  She remains HIGINIO.  She will continue with routine surveillance and will contact us promptly if she has any problems or questions in the interim.  She will let us know if her gas symptoms persist or worsen.    Per SGO 2017 Guidelines, f/u is every 3 months for the first 2 years, and then every 6 months for years 3 to 5.   is  optional during this time with  and imaging done for suspected recurrence.       PLAN  1.  Follow-up today's   2.  Notify us if gas symptoms persist or worsen  3.  Follow-up in 6 months          Frederick Stockton MD

## 2022-09-29 ENCOUNTER — PATIENT OUTREACH (OUTPATIENT)
Dept: ADMINISTRATIVE | Facility: HOSPITAL | Age: 67
End: 2022-09-29
Payer: COMMERCIAL

## 2022-09-29 NOTE — PROGRESS NOTES
Health Maintenance Due   Topic Date Due    COVID-19 Vaccine (1) Never done    TETANUS VACCINE  Never done    Shingles Vaccine (1 of 2) Never done    Influenza Vaccine (1) 09/01/2022    DEXA Scan  10/22/2022

## 2022-10-16 NOTE — TELEPHONE ENCOUNTER
Patient is status post chemotherapy on January 2, 2020.  She has had post chemo nausea vomiting.  She has not tolerated ondansetron.    Prescription sent in for Phenergan 25 mg p.o..  Also discussed that if this does not help her nausea then she will need to go to the emergency room for IV fluids for dehydration.  She voiced understanding.    ----- Message from Alice Whyte RN sent at 1/8/2020  9:47 AM CST -----  Spoke with pt. States that she went back to work Monday. She states she has been having heart palpitations. States she has been eating and drinking but can't taste anything. Pt states that she is short of breath when she walks. Pt states this started Monday at work. She states she can't sit down or lay down for too long or her body hurts. Pt has not been taking her pain medication because she has been working. Pt was wondering if she should work half days since she hasn't been to work in 2 months. Informed pt that I would send this message to Dr Shukla for review. Pt denies any other needs at this time.       HEADACHE

## 2023-01-05 ENCOUNTER — OFFICE VISIT (OUTPATIENT)
Dept: PRIMARY CARE CLINIC | Facility: CLINIC | Age: 68
End: 2023-01-05
Payer: COMMERCIAL

## 2023-01-05 ENCOUNTER — LAB VISIT (OUTPATIENT)
Dept: LAB | Facility: HOSPITAL | Age: 68
End: 2023-01-05
Attending: INTERNAL MEDICINE
Payer: COMMERCIAL

## 2023-01-05 VITALS
TEMPERATURE: 98 F | WEIGHT: 163.13 LBS | DIASTOLIC BLOOD PRESSURE: 88 MMHG | SYSTOLIC BLOOD PRESSURE: 114 MMHG | HEIGHT: 64 IN | BODY MASS INDEX: 27.85 KG/M2 | HEART RATE: 67 BPM | OXYGEN SATURATION: 98 %

## 2023-01-05 DIAGNOSIS — M85.80 OSTEOPENIA, UNSPECIFIED LOCATION: ICD-10-CM

## 2023-01-05 DIAGNOSIS — E55.9 VITAMIN D DEFICIENCY: ICD-10-CM

## 2023-01-05 DIAGNOSIS — Z23 NEED FOR SHINGLES VACCINE: ICD-10-CM

## 2023-01-05 DIAGNOSIS — Z23 NEED FOR COVID-19 VACCINE: ICD-10-CM

## 2023-01-05 DIAGNOSIS — Z00.00 ROUTINE MEDICAL EXAM: Primary | ICD-10-CM

## 2023-01-05 DIAGNOSIS — Z00.00 ROUTINE MEDICAL EXAM: ICD-10-CM

## 2023-01-05 LAB
25(OH)D3+25(OH)D2 SERPL-MCNC: 56 NG/ML (ref 30–96)
ALBUMIN SERPL BCP-MCNC: 3.7 G/DL (ref 3.5–5.2)
ALP SERPL-CCNC: 74 U/L (ref 55–135)
ALT SERPL W/O P-5'-P-CCNC: 18 U/L (ref 10–44)
ANION GAP SERPL CALC-SCNC: 10 MMOL/L (ref 8–16)
AST SERPL-CCNC: 19 U/L (ref 10–40)
BILIRUB SERPL-MCNC: 0.5 MG/DL (ref 0.1–1)
BUN SERPL-MCNC: 15 MG/DL (ref 8–23)
CALCIUM SERPL-MCNC: 10 MG/DL (ref 8.7–10.5)
CHLORIDE SERPL-SCNC: 103 MMOL/L (ref 95–110)
CHOLEST SERPL-MCNC: 243 MG/DL (ref 120–199)
CHOLEST/HDLC SERPL: 4.1 {RATIO} (ref 2–5)
CO2 SERPL-SCNC: 27 MMOL/L (ref 23–29)
CREAT SERPL-MCNC: 1.1 MG/DL (ref 0.5–1.4)
ERYTHROCYTE [DISTWIDTH] IN BLOOD BY AUTOMATED COUNT: 13.4 % (ref 11.5–14.5)
EST. GFR  (NO RACE VARIABLE): 55.8 ML/MIN/1.73 M^2
GLUCOSE SERPL-MCNC: 78 MG/DL (ref 70–110)
HCT VFR BLD AUTO: 42.9 % (ref 37–48.5)
HDLC SERPL-MCNC: 60 MG/DL (ref 40–75)
HDLC SERPL: 24.7 % (ref 20–50)
HGB BLD-MCNC: 14.2 G/DL (ref 12–16)
LDLC SERPL CALC-MCNC: 166.2 MG/DL (ref 63–159)
MCH RBC QN AUTO: 30.8 PG (ref 27–31)
MCHC RBC AUTO-ENTMCNC: 33.1 G/DL (ref 32–36)
MCV RBC AUTO: 93 FL (ref 82–98)
NONHDLC SERPL-MCNC: 183 MG/DL
PLATELET # BLD AUTO: 316 K/UL (ref 150–450)
PMV BLD AUTO: 10.6 FL (ref 9.2–12.9)
POTASSIUM SERPL-SCNC: 4 MMOL/L (ref 3.5–5.1)
PROT SERPL-MCNC: 7.8 G/DL (ref 6–8.4)
RBC # BLD AUTO: 4.61 M/UL (ref 4–5.4)
SODIUM SERPL-SCNC: 140 MMOL/L (ref 136–145)
TRIGL SERPL-MCNC: 84 MG/DL (ref 30–150)
WBC # BLD AUTO: 6.55 K/UL (ref 3.9–12.7)

## 2023-01-05 PROCEDURE — 99397 PR PREVENTIVE VISIT,EST,65 & OVER: ICD-10-PCS | Mod: S$GLB,,, | Performed by: INTERNAL MEDICINE

## 2023-01-05 PROCEDURE — 85027 COMPLETE CBC AUTOMATED: CPT | Performed by: INTERNAL MEDICINE

## 2023-01-05 PROCEDURE — 3008F PR BODY MASS INDEX (BMI) DOCUMENTED: ICD-10-PCS | Mod: CPTII,S$GLB,, | Performed by: INTERNAL MEDICINE

## 2023-01-05 PROCEDURE — 1159F MED LIST DOCD IN RCRD: CPT | Mod: CPTII,S$GLB,, | Performed by: INTERNAL MEDICINE

## 2023-01-05 PROCEDURE — 1101F PT FALLS ASSESS-DOCD LE1/YR: CPT | Mod: CPTII,S$GLB,, | Performed by: INTERNAL MEDICINE

## 2023-01-05 PROCEDURE — 36415 COLL VENOUS BLD VENIPUNCTURE: CPT | Mod: PN | Performed by: INTERNAL MEDICINE

## 2023-01-05 PROCEDURE — 82306 VITAMIN D 25 HYDROXY: CPT | Performed by: INTERNAL MEDICINE

## 2023-01-05 PROCEDURE — 99999 PR PBB SHADOW E&M-EST. PATIENT-LVL IV: CPT | Mod: PBBFAC,,, | Performed by: INTERNAL MEDICINE

## 2023-01-05 PROCEDURE — 1160F RVW MEDS BY RX/DR IN RCRD: CPT | Mod: CPTII,S$GLB,, | Performed by: INTERNAL MEDICINE

## 2023-01-05 PROCEDURE — 99397 PER PM REEVAL EST PAT 65+ YR: CPT | Mod: S$GLB,,, | Performed by: INTERNAL MEDICINE

## 2023-01-05 PROCEDURE — 1159F PR MEDICATION LIST DOCUMENTED IN MEDICAL RECORD: ICD-10-PCS | Mod: CPTII,S$GLB,, | Performed by: INTERNAL MEDICINE

## 2023-01-05 PROCEDURE — 3288F PR FALLS RISK ASSESSMENT DOCUMENTED: ICD-10-PCS | Mod: CPTII,S$GLB,, | Performed by: INTERNAL MEDICINE

## 2023-01-05 PROCEDURE — 3079F DIAST BP 80-89 MM HG: CPT | Mod: CPTII,S$GLB,, | Performed by: INTERNAL MEDICINE

## 2023-01-05 PROCEDURE — 1160F PR REVIEW ALL MEDS BY PRESCRIBER/CLIN PHARMACIST DOCUMENTED: ICD-10-PCS | Mod: CPTII,S$GLB,, | Performed by: INTERNAL MEDICINE

## 2023-01-05 PROCEDURE — 3074F SYST BP LT 130 MM HG: CPT | Mod: CPTII,S$GLB,, | Performed by: INTERNAL MEDICINE

## 2023-01-05 PROCEDURE — 1101F PR PT FALLS ASSESS DOC 0-1 FALLS W/OUT INJ PAST YR: ICD-10-PCS | Mod: CPTII,S$GLB,, | Performed by: INTERNAL MEDICINE

## 2023-01-05 PROCEDURE — 3008F BODY MASS INDEX DOCD: CPT | Mod: CPTII,S$GLB,, | Performed by: INTERNAL MEDICINE

## 2023-01-05 PROCEDURE — 3074F PR MOST RECENT SYSTOLIC BLOOD PRESSURE < 130 MM HG: ICD-10-PCS | Mod: CPTII,S$GLB,, | Performed by: INTERNAL MEDICINE

## 2023-01-05 PROCEDURE — 80061 LIPID PANEL: CPT | Performed by: INTERNAL MEDICINE

## 2023-01-05 PROCEDURE — 99999 PR PBB SHADOW E&M-EST. PATIENT-LVL IV: ICD-10-PCS | Mod: PBBFAC,,, | Performed by: INTERNAL MEDICINE

## 2023-01-05 PROCEDURE — 1126F AMNT PAIN NOTED NONE PRSNT: CPT | Mod: CPTII,S$GLB,, | Performed by: INTERNAL MEDICINE

## 2023-01-05 PROCEDURE — 3288F FALL RISK ASSESSMENT DOCD: CPT | Mod: CPTII,S$GLB,, | Performed by: INTERNAL MEDICINE

## 2023-01-05 PROCEDURE — 80053 COMPREHEN METABOLIC PANEL: CPT | Performed by: INTERNAL MEDICINE

## 2023-01-05 PROCEDURE — 3079F PR MOST RECENT DIASTOLIC BLOOD PRESSURE 80-89 MM HG: ICD-10-PCS | Mod: CPTII,S$GLB,, | Performed by: INTERNAL MEDICINE

## 2023-01-05 PROCEDURE — 1126F PR PAIN SEVERITY QUANTIFIED, NO PAIN PRESENT: ICD-10-PCS | Mod: CPTII,S$GLB,, | Performed by: INTERNAL MEDICINE
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
## 2023-01-08 ENCOUNTER — PATIENT MESSAGE (OUTPATIENT)
Dept: PRIMARY CARE CLINIC | Facility: CLINIC | Age: 68
End: 2023-01-08
Payer: COMMERCIAL

## 2023-01-08 DIAGNOSIS — E55.9 VITAMIN D DEFICIENCY: ICD-10-CM

## 2023-01-08 RX ORDER — ERGOCALCIFEROL 1.25 MG/1
50000 CAPSULE ORAL
Qty: 6 CAPSULE | Refills: 2 | Status: SHIPPED | OUTPATIENT
Start: 2023-01-08 | End: 2024-02-07 | Stop reason: SDUPTHER

## 2023-01-13 ENCOUNTER — OFFICE VISIT (OUTPATIENT)
Dept: OPTOMETRY | Facility: CLINIC | Age: 68
End: 2023-01-13
Payer: COMMERCIAL

## 2023-01-13 DIAGNOSIS — H25.13 NUCLEAR SCLEROSIS OF BOTH EYES: Primary | ICD-10-CM

## 2023-01-13 DIAGNOSIS — H52.7 REFRACTIVE ERROR: ICD-10-CM

## 2023-01-13 PROCEDURE — 1159F PR MEDICATION LIST DOCUMENTED IN MEDICAL RECORD: ICD-10-PCS | Mod: CPTII,S$GLB,, | Performed by: OPTOMETRIST

## 2023-01-13 PROCEDURE — 1126F PR PAIN SEVERITY QUANTIFIED, NO PAIN PRESENT: ICD-10-PCS | Mod: CPTII,S$GLB,, | Performed by: OPTOMETRIST

## 2023-01-13 PROCEDURE — 1160F RVW MEDS BY RX/DR IN RCRD: CPT | Mod: CPTII,S$GLB,, | Performed by: OPTOMETRIST

## 2023-01-13 PROCEDURE — 92004 PR EYE EXAM, NEW PATIENT,COMPREHESV: ICD-10-PCS | Mod: S$GLB,,, | Performed by: OPTOMETRIST

## 2023-01-13 PROCEDURE — 3288F PR FALLS RISK ASSESSMENT DOCUMENTED: ICD-10-PCS | Mod: CPTII,S$GLB,, | Performed by: OPTOMETRIST

## 2023-01-13 PROCEDURE — 92015 PR REFRACTION: ICD-10-PCS | Mod: S$GLB,,, | Performed by: OPTOMETRIST

## 2023-01-13 PROCEDURE — 1160F PR REVIEW ALL MEDS BY PRESCRIBER/CLIN PHARMACIST DOCUMENTED: ICD-10-PCS | Mod: CPTII,S$GLB,, | Performed by: OPTOMETRIST

## 2023-01-13 PROCEDURE — 1126F AMNT PAIN NOTED NONE PRSNT: CPT | Mod: CPTII,S$GLB,, | Performed by: OPTOMETRIST

## 2023-01-13 PROCEDURE — 92015 DETERMINE REFRACTIVE STATE: CPT | Mod: S$GLB,,, | Performed by: OPTOMETRIST

## 2023-01-13 PROCEDURE — 1101F PT FALLS ASSESS-DOCD LE1/YR: CPT | Mod: CPTII,S$GLB,, | Performed by: OPTOMETRIST

## 2023-01-13 PROCEDURE — 1159F MED LIST DOCD IN RCRD: CPT | Mod: CPTII,S$GLB,, | Performed by: OPTOMETRIST

## 2023-01-13 PROCEDURE — 1101F PR PT FALLS ASSESS DOC 0-1 FALLS W/OUT INJ PAST YR: ICD-10-PCS | Mod: CPTII,S$GLB,, | Performed by: OPTOMETRIST

## 2023-01-13 PROCEDURE — 92004 COMPRE OPH EXAM NEW PT 1/>: CPT | Mod: S$GLB,,, | Performed by: OPTOMETRIST

## 2023-01-13 PROCEDURE — 99999 PR PBB SHADOW E&M-EST. PATIENT-LVL II: ICD-10-PCS | Mod: PBBFAC,,, | Performed by: OPTOMETRIST

## 2023-01-13 PROCEDURE — 99999 PR PBB SHADOW E&M-EST. PATIENT-LVL II: CPT | Mod: PBBFAC,,, | Performed by: OPTOMETRIST

## 2023-01-13 PROCEDURE — 3288F FALL RISK ASSESSMENT DOCD: CPT | Mod: CPTII,S$GLB,, | Performed by: OPTOMETRIST

## 2023-01-13 NOTE — PROGRESS NOTES
Subjective:       Patient ID: Liz Coto is a 67 y.o. female      Chief Complaint   Patient presents with    Concerns About Ocular Health     History of Present Illness  Dls: ? Yrs     68 y/o female presents today for ocular health check.  Pt states no changes in vision. Pt wears otc readers for computer.     No tearing  No itching  No burning  No pain  No ha's  No floaters  No flashes    Eye meds  None         Assessment/Plan:     1. Nuclear sclerosis of both eyes  Educated pt on presence of cataracts and effects on vision. No surgery at this time. Recheck in one year, sooner PRN.    2. Refractive error  Educated patient on refractive error and discussed lens options. Dispensed updated spectacle Rx. Educated about adaptation period to new specs.    Eyeglass Final Rx       Eyeglass Final Rx         Sphere Cylinder Axis Add    Right -0.50 +0.25 090 +2.50    Left -0.75 +0.50 015 +2.50      Expiration Date: 1/13/2024                    Follow up in about 1 year (around 1/13/2024).

## 2023-02-10 ENCOUNTER — HOSPITAL ENCOUNTER (OUTPATIENT)
Dept: RADIOLOGY | Facility: CLINIC | Age: 68
Discharge: HOME | End: 2023-02-10
Attending: INTERNAL MEDICINE
Payer: COMMERCIAL

## 2023-02-10 DIAGNOSIS — M85.80 OSTEOPENIA, UNSPECIFIED LOCATION: ICD-10-CM

## 2023-02-10 PROCEDURE — 77080 DXA BONE DENSITY AXIAL: CPT | Mod: TC

## 2023-02-10 PROCEDURE — 77080 DXA BONE DENSITY AXIAL: CPT | Mod: 26,,, | Performed by: INTERNAL MEDICINE

## 2023-02-10 PROCEDURE — 77080 DEXA BONE DENSITY SPINE HIP: ICD-10-PCS | Mod: 26,,, | Performed by: INTERNAL MEDICINE

## 2023-02-16 ENCOUNTER — TELEPHONE (OUTPATIENT)
Dept: GYNECOLOGIC ONCOLOGY | Facility: CLINIC | Age: 68
End: 2023-02-16
Payer: COMMERCIAL

## 2023-02-16 NOTE — TELEPHONE ENCOUNTER
----- Message from Maria Ines Mejias sent at 2/16/2023  2:46 PM CST -----   Name of Who is Calling:     What is the request in detail:  request call back in reference to verify if  have received  updated genetic test results Please contact to further discuss and advise      Can the clinic reply by [facility]:     What Number to Call Back [facility]:  [email or web address] / [phone] ext # [phone]

## 2023-02-27 ENCOUNTER — LAB VISIT (OUTPATIENT)
Dept: LAB | Facility: HOSPITAL | Age: 68
End: 2023-02-27
Payer: COMMERCIAL

## 2023-02-27 ENCOUNTER — OFFICE VISIT (OUTPATIENT)
Dept: GYNECOLOGIC ONCOLOGY | Facility: CLINIC | Age: 68
End: 2023-02-27
Payer: COMMERCIAL

## 2023-02-27 VITALS
WEIGHT: 164.44 LBS | DIASTOLIC BLOOD PRESSURE: 71 MMHG | BODY MASS INDEX: 27.4 KG/M2 | SYSTOLIC BLOOD PRESSURE: 108 MMHG | HEIGHT: 65 IN | HEART RATE: 81 BPM

## 2023-02-27 DIAGNOSIS — C57.01 FALLOPIAN TUBE CANCER, CARCINOMA, RIGHT: Primary | ICD-10-CM

## 2023-02-27 DIAGNOSIS — C57.01 FALLOPIAN TUBE CANCER, CARCINOMA, RIGHT: ICD-10-CM

## 2023-02-27 LAB — CANCER AG125 SERPL-ACNC: 11 U/ML (ref 0–30)

## 2023-02-27 PROCEDURE — 1101F PR PT FALLS ASSESS DOC 0-1 FALLS W/OUT INJ PAST YR: ICD-10-PCS | Mod: CPTII,S$GLB,, | Performed by: OBSTETRICS & GYNECOLOGY

## 2023-02-27 PROCEDURE — 1126F PR PAIN SEVERITY QUANTIFIED, NO PAIN PRESENT: ICD-10-PCS | Mod: CPTII,S$GLB,, | Performed by: OBSTETRICS & GYNECOLOGY

## 2023-02-27 PROCEDURE — 1101F PT FALLS ASSESS-DOCD LE1/YR: CPT | Mod: CPTII,S$GLB,, | Performed by: OBSTETRICS & GYNECOLOGY

## 2023-02-27 PROCEDURE — 99214 OFFICE O/P EST MOD 30 MIN: CPT | Mod: S$GLB,,, | Performed by: OBSTETRICS & GYNECOLOGY

## 2023-02-27 PROCEDURE — 99999 PR PBB SHADOW E&M-EST. PATIENT-LVL III: CPT | Mod: PBBFAC,,, | Performed by: OBSTETRICS & GYNECOLOGY

## 2023-02-27 PROCEDURE — 3288F PR FALLS RISK ASSESSMENT DOCUMENTED: ICD-10-PCS | Mod: CPTII,S$GLB,, | Performed by: OBSTETRICS & GYNECOLOGY

## 2023-02-27 PROCEDURE — 99214 PR OFFICE/OUTPT VISIT, EST, LEVL IV, 30-39 MIN: ICD-10-PCS | Mod: S$GLB,,, | Performed by: OBSTETRICS & GYNECOLOGY

## 2023-02-27 PROCEDURE — 86304 IMMUNOASSAY TUMOR CA 125: CPT | Performed by: NURSE PRACTITIONER

## 2023-02-27 PROCEDURE — 36415 COLL VENOUS BLD VENIPUNCTURE: CPT | Performed by: NURSE PRACTITIONER

## 2023-02-27 PROCEDURE — 3074F SYST BP LT 130 MM HG: CPT | Mod: CPTII,S$GLB,, | Performed by: OBSTETRICS & GYNECOLOGY

## 2023-02-27 PROCEDURE — 3078F PR MOST RECENT DIASTOLIC BLOOD PRESSURE < 80 MM HG: ICD-10-PCS | Mod: CPTII,S$GLB,, | Performed by: OBSTETRICS & GYNECOLOGY

## 2023-02-27 PROCEDURE — 1126F AMNT PAIN NOTED NONE PRSNT: CPT | Mod: CPTII,S$GLB,, | Performed by: OBSTETRICS & GYNECOLOGY

## 2023-02-27 PROCEDURE — 3008F BODY MASS INDEX DOCD: CPT | Mod: CPTII,S$GLB,, | Performed by: OBSTETRICS & GYNECOLOGY

## 2023-02-27 PROCEDURE — 3288F FALL RISK ASSESSMENT DOCD: CPT | Mod: CPTII,S$GLB,, | Performed by: OBSTETRICS & GYNECOLOGY

## 2023-02-27 PROCEDURE — 3074F PR MOST RECENT SYSTOLIC BLOOD PRESSURE < 130 MM HG: ICD-10-PCS | Mod: CPTII,S$GLB,, | Performed by: OBSTETRICS & GYNECOLOGY

## 2023-02-27 PROCEDURE — 99999 PR PBB SHADOW E&M-EST. PATIENT-LVL III: ICD-10-PCS | Mod: PBBFAC,,, | Performed by: OBSTETRICS & GYNECOLOGY

## 2023-02-27 PROCEDURE — 3078F DIAST BP <80 MM HG: CPT | Mod: CPTII,S$GLB,, | Performed by: OBSTETRICS & GYNECOLOGY

## 2023-02-27 PROCEDURE — 3008F PR BODY MASS INDEX (BMI) DOCUMENTED: ICD-10-PCS | Mod: CPTII,S$GLB,, | Performed by: OBSTETRICS & GYNECOLOGY

## 2023-02-27 NOTE — PROGRESS NOTES
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   Liz Coto is a 67 y.o. 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 Shukla.  I assumed her surveillance care with Dr. Shukla's residential.      She has no vaginal bleeding or discharge.  She is having no nausea or vomiting.  She has no bowel or bladder complaints, though her bowel movements are not quite as regular as they used to be.  She has no pain issues.  She feels good and has no other concerns.     HPI or ONCOLOGIC HISTORY  10/8/2019  1097     2019 HOLLY/BSO/small bowel resection/resection of PA sherry mass and omentectomy.   PATHOLOGY:  FIGO stage GVJG0ye grade 3 right fallopian tube adenocarcinoma with retroperitoneal nodes. 2 omental nodes also found. No gross omental involvement. No residual disease.      2019 - 4/15/2020 Carbo Taxotere x 6 (First cycle single agent carbo due to taxol reaction)   : 1097>20(before C1)>13(p C3)>15(C6)>14.       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  She has 2 other younger brothers and about 10 first cousins on her mother's side.    REVIEW OF SYSTEMS  Review of Systems   Constitutional:  Negative for appetite change, chills, fatigue, fever and unexpected weight change.   HENT:   Negative for lump/mass and mouth sores.    Respiratory:  Negative for chest tightness, cough and shortness of breath.    Cardiovascular:  Negative for chest pain, leg swelling and palpitations.   Gastrointestinal:  Negative for abdominal distention, abdominal pain, blood in stool, constipation,  diarrhea, nausea and vomiting.   Genitourinary:  Negative for dysuria, frequency, vaginal bleeding and vaginal discharge.    Musculoskeletal:  Negative for arthralgias and myalgias.   Skin:  Negative for rash.   Neurological:  Negative for dizziness, light-headedness and numbness.   Hematological:  Negative for adenopathy.   Psychiatric/Behavioral:  Negative for decreased concentration, depression and sleep disturbance. The patient is not nervous/anxious.      OBJECTIVE   Vitals:    02/27/23 0916   BP: 108/71   Pulse: 81      Body mass index is 27.37 kg/m².     Physical Exam:   Constitutional: She is oriented to person, place, and time. She appears well-developed and well-nourished. No distress.    HENT:   Head: Normocephalic and atraumatic.    Eyes: Conjunctivae and EOM are normal. No scleral icterus.    Neck: No thyromegaly present.    Cardiovascular:  Normal rate, regular rhythm and normal heart sounds.      Exam reveals no gallop, no friction rub, no clubbing, no cyanosis and no edema.       No murmur heard.   Pulmonary/Chest: Effort normal. No respiratory distress.        Abdominal: Soft. She exhibits abdominal incision. She exhibits no distension and no mass. There is no abdominal tenderness. There is no rebound and no guarding. No hernia.                Lymphadenopathy:     She has no cervical adenopathy.    Neurological: She is alert and oriented to person, place, and time.    Skin: No rash noted. No cyanosis. Nails show no clubbing.    Psychiatric: She has a normal mood and affect. Her behavior is normal.   ECOG status: 0    LABORATORY DATA  Lab data reviewed.    RADIOLOGICAL DATA  Radiology data reviewed.    PATHOLOGY DATA  Pathology data reviewed.    ASSESSMENT    1. Fallopian tube cancer, carcinoma, right    The patient was seen, examined, and counseled by me.  She remains HIGINIO.  She will continue with routine surveillance and will contact us promptly if she has any problems or questions in the interim.        Per SGO 2017 Guidelines, f/u is every 3 months for the first 2 years, and then every 6 months for years 3 to 5.   is optional during this time with  and imaging done for suspected recurrence.    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  She previously discussed this with her family and will address it again in light of the reclassification.     PLAN  Orders Placed This Encounter   Procedures    CANCER ANTIGEN 125    CANCER ANTIGEN 125   Follow-up in 6 months  Male family members should consider referral to cancer genetics and consider early PSA screening             Frederick Stockton MD

## 2023-03-06 DIAGNOSIS — Z15.03 MUTATION IN HOXB13 GENE: Primary | ICD-10-CM

## 2023-03-07 ENCOUNTER — TELEPHONE (OUTPATIENT)
Dept: GYNECOLOGIC ONCOLOGY | Facility: CLINIC | Age: 68
End: 2023-03-07
Payer: COMMERCIAL

## 2023-03-07 NOTE — TELEPHONE ENCOUNTER
Contacted patient to inform her that Dr. Stockton discussed her case with our cancer genetics team and they would actually like to see her in person to provide more information and counseling. Patient stated that she had been calling family members since her last appt and informing the men in her family what was going on and to get checked out. Let her know that Dr. Stockton placed a referral and they should be reaching out to her soon to schedule her to come in. Patient verbalized understanding.

## 2023-03-09 ENCOUNTER — TELEPHONE (OUTPATIENT)
Dept: HEMATOLOGY/ONCOLOGY | Facility: CLINIC | Age: 68
End: 2023-03-09
Payer: COMMERCIAL

## 2023-03-30 ENCOUNTER — PATIENT MESSAGE (OUTPATIENT)
Dept: HEMATOLOGY/ONCOLOGY | Facility: CLINIC | Age: 68
End: 2023-03-30

## 2023-03-30 ENCOUNTER — OFFICE VISIT (OUTPATIENT)
Dept: HEMATOLOGY/ONCOLOGY | Facility: CLINIC | Age: 68
End: 2023-03-30
Payer: COMMERCIAL

## 2023-03-30 DIAGNOSIS — Z80.3 FAMILY HISTORY OF BREAST CANCER: ICD-10-CM

## 2023-03-30 DIAGNOSIS — Z91.89 AT INCREASED RISK OF BREAST CANCER: ICD-10-CM

## 2023-03-30 DIAGNOSIS — Z15.03 MUTATION IN HOXB13 GENE: ICD-10-CM

## 2023-03-30 DIAGNOSIS — Z85.44 HISTORY OF CANCER OF FALLOPIAN TUBE IN ADULTHOOD: ICD-10-CM

## 2023-03-30 DIAGNOSIS — Z80.0 FAMILY HISTORY OF COLON CANCER: ICD-10-CM

## 2023-03-30 DIAGNOSIS — Z71.83 ENCOUNTER FOR NONPROCREATIVE GENETIC COUNSELING: Primary | ICD-10-CM

## 2023-03-30 PROCEDURE — 99417 PROLNG OP E/M EACH 15 MIN: CPT | Mod: 95,,, | Performed by: NURSE PRACTITIONER

## 2023-03-30 PROCEDURE — 99205 PR OFFICE/OUTPT VISIT, NEW, LEVL V, 60-74 MIN: ICD-10-PCS | Mod: 95,,, | Performed by: NURSE PRACTITIONER

## 2023-03-30 PROCEDURE — 99205 OFFICE O/P NEW HI 60 MIN: CPT | Mod: 95,,, | Performed by: NURSE PRACTITIONER

## 2023-03-30 PROCEDURE — 99417 PR PROLONGED SVC, OUTPT, W/WO DIRECT PT CONTACT,  EA ADDTL 15 MIN: ICD-10-PCS | Mod: 95,,, | Performed by: NURSE PRACTITIONER

## 2023-03-30 NOTE — LETTER
"     2023    Liz Coto  [address]  [facility] [address]         To:         Liz Coto (1955)  Nicolas:   Liz, please read the letter attached, and let me know if any questions/concerns; if no questions/concerns, please disseminate the letter to your relatives.     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  HOXB13 is a cancer susceptibility/predisposition gene, which, when harboring a clinically significant variant, does not work as expected to stop cancer from developing.  There are important implications for Liz's relatives.      General information on Liz's genetic test, the HOXB13 gene, and management associated with the HOXB13 gene is provided below; however, this is not to be considered medical advice, and you are encouraged to discuss this information with your own healthcare providers and genetic specialists.      You are encouraged, also, to provide this letter to your healthcare providers and genetic specialists, as it contains pertinent information related to your potential genetic testing.      Your relative's (Liz's) genetic testing report summary:  Patient name: Liz Coto ("Liz")   Patient : 1955    Test name: Integrated BRACAnalysis(R) with SMB Suite(TM) Hereditary Cancer   Test laboratory: [facility]   Specimen type: Blood   Collection date: 2020   Number of genes tested: 35   Genes analyzed: APC, ZEENAT, AXIN2, BARD1, BMPR1A, BRCA1, BRCA2, BRIP1, CDH1, CDK4, CDKN2A, CHEK2, EPCAM, GALNT12, GREM1, HOXB13, MLH1, MSH2, MSH3, MSH6, MUTYH, NBN, NTHL1, PALB2, PMS2, POLD1, POLE, PTEN, RAD51C, " RAD51D, RNF43, RPS20, SMAD4, STK11, TP53    See report for limitations and technical specifications.    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) (X285K), heterozygous    Variants of Uncertain Significance identified:  MSH3 c.1567G>A (p.Srq151Diq), heterozygous  MSH3 c.2511G>A (p.Bpl435Pyy), heterozygous       Risks associated with carrying the HOXB13 X285K variant:  Autosomal-dominantly inherited conditions, meaning that a clinically significant variant on only one copy (not on both copies) of the HOXB13 gene is all that's needed for the risk to apply: Elevated risk for prostate cancer; importantly, this variant appears to be associated with risk for more aggressive disease at an early age      Implications for Liz's relatives:    Both males and females can carry a clinically significant variant in their HOXB13 gene and may pass it down to their children.  When passed down, the variant would be passed directly from parent to child.  Liz's first-degree relatives has a 50% chance of also carrying Liz's HOXB13 variant, while other, more distant blood relatives are also at risk for carrying it.     HOXB13 clinically significant variant carriers of reproductive age should consider meeting with a reproductive genetic specialist or fertility specialist prior to conceiving a child, to determine potential options (such as assisted reproduction with preimplantation genetic diagnosis).    How to obtain genetic counseling/testing:    It is recommended that relatives of Liz's meet with a genetics professional for genetic counseling and potentially testing,  for their own health purposes and potentially also for reproductive purposes.    For individuals located in [place]:    Cancer Genetics:  [facility], phone number [phone].    Reproductive Genetics:  [facility], phone number [phone].  For individuals located outside of [place]:    Cancer Genetics:  Phone [facility] at [phone] to determine if there is a provider who can see you virtually.  Reproductive Genetics:  Phone [facility] at [phone] to determine if there is a provider who can see you virtually.  For cancer and/or reproductive genetics purposes, genetic professionals nearest to your location can be located by visiting [email or web address].    Of note, Liz's relatives can carry clinically significant variants in addition to and/or other than those identified in Liz, which is one reason why it's important to first meet with a genetics professional prior to undergoing genetic testing.     VERY IMPORTANT:  Please let Liz know of your genetic testing results so that she can inform me and I can then provide further guidance for the family.     Please don't hesitate to call with questions pertaining to logistics of your testing, etc.                                                                                                                         Sincerely,          Arron Swartz, [place], APRN, FNP-BC, AOCNP, CGRA  Nurse Practitioner, Hereditary and High-Risk Clinic  Department of Hematology and Oncology  [facility]    Phone:  [phone]

## 2023-03-30 NOTE — PROGRESS NOTES
"Cancer Genetics  Hereditary and High-Risk Clinic  Department of Hematology and Oncology  [facility]    [facility]    Date of Service:  3/30/23  Visit Provider:  Arron Swartz DNP  Collaborating Physician:  Eileen Quintero MD    Patient ID  Name: Liz Coto    : 1955    MRN: [id number]      Referring Provider  Frederick Stockton MD  [address] [place] [address]    Televisit Information  The patient location is: [place], [place].  The chief complaint leading to consultation is:  As below.    Visit type: audiovisual.    Face-to-face time with patient:  Approximately 58 minutes.    Approximately 107 minutes in total were spent on this encounter, which includes face-to-face time and non-face-to-face time preparing to see the patient (e.g., review of tests), obtaining and/or reviewing separately obtained history, documenting clinical information in the electronic or other health record, independently interpreting results (not separately reported) and communicating results to the patient/family/caregiver, or care coordination (not separately reported).  Each patient to whom he or she provides medical services by telemedicine is:  (1) informed of the relationship between the physician and patient and the respective role of any other health care provider with respect to management of the patient; and (2) notified that he or she may decline to receive medical services by telemedicine and may withdraw from such care at any time.  Notes:  As below.    SUBJECTIVE      Chief Complaint: Results    History of Present Illness (HPI):  Liz Coto ("Liz"), 67 y.o., assigned female sex at birth, is new to the [facility] Department of [facility]ology and [facility] and to me.  She was referred by Dr. Frederick Stockton, with the Gynecologic Oncology department, for post-test genetic counseling given that a genetic variant which in  she was found to carry in her HOXB13 gene has " "been reclassified from a variant of uncertain significance (VUS) to a clinically significant variant.    Focused Medical History  Genetic testing:  Yes, once  Patient name: Liz Coto ("Liz")   Patient : 1955    Test name: Integrated [employer](R) with [employer](TM) Hereditary Cancer   Test laboratory: [facility]   Specimen type: Blood   Collection date: 2020   Number of genes tested: 35   Genes analyzed: APC, ZEENAT, AXIN2, BARD1, BMPR1A, BRCA1, BRCA2, BRIP1, CDH1, CDK4, CDKN2A, CHEK2, EPCAM, GALNT12, GREM1, HOXB13, MLH1, MSH2, MSH3, MSH6, MUTYH, NBN, NTHL1, PALB2, PMS2, POLD1, POLE, PTEN, RAD51C, RAD51D, RNF43, RPS20, SMAD4, STK11, TP53    See report for limitations and technical specifications.    ORIGINAL REPORT: AMENDED REPORT:   Results date: 2020   RESULTS: Clinically Significant Variants identified:  None     Variants of Uncertain Significance identified:  HOXB13 c.853del (p.*285Lysext*97), heterozygous  MSH3 c.1567G>A (p.Ukc740Irw), heterozygous  MSH3 c.2511G>A (p.Eyc358Qgq), heterozygous Clinically Significant Variants identified:  HOXB13 c.853del (p.*285Lysext*97), heterozygous    Variants of Uncertain Significance identified:  MSH3 c.1567G>A (p.Fvk520Aie), heterozygous  MSH3 c.2511G>A (p.Bdw150Its), heterozygous       Cancer:  Yes  In 10/2019, at age 64, a pelvic mass was identified on CT scan.  Subsequently, on 2019, HOLLY, BSO, lymph node dissection, and partial omentectomy were performed.  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  Pathologic Stage Classification (pTNM, AJCC 8th Edition):  pT3 pN1b; FIGO Stage (2015 FIGO Cancer Report):  IIIA(ii).  Patient underwent adjuvant chemotherapy (no radiation therapy).  Colon polyp:  " "No    09/29/2020 colonoscopy (age 65, at [facility] -- patient's only colonoscopy thus far):  No polyps; 5-year repeat was recommended at that time  Other pertinent lesion:  Yes  Intramural leiomyoma and benign endometrial polyp on HOLLY  Pancreatitis or pancreatic cyst:  No  Blood disorder:  No    Breast Cancer Risk Assessment Questionnaire  Age:  67 y.o.   Race and ethnicity:  Black or ,  (MGM's side),  (Yi) (MGF's side), Not  or /a  Weight:  163 lb  Height:  5'5"  Mammographic breast density:  heterogeneously dense   Age at menarche:  18y  Age at first live childbirth:  Nulliparous  Menopausal status:  postmenopausal  Age at menopause, if applicable:  50y  Hormone replacement therapy use history:  never  Breast biopsy history and findings:  never  Thoracic radiation therapy history:  never  Breast cancer susceptibility gene testing history:  See above  Ashkenazi Sikhism ancestry:  See below  Family history of breast cancer, ovarian cancer, and genetic testing:  See below    Focused Social History  Former smoker  Quit smoking:  in 2019  Total number of years smoked:  20  Average amount smoked:  1 pack/3-4 days = ~2 packs/week =   = ~5.8 pack-years    Ancestry  Ashkenazi Sikhism:  No  +  + (MGM's side)  + (Yi) (MGF's side)    Family Oncologic History  ** The pedigree below was placed into this note in a size that produced a legible font.  If it is appearing small/illegible on your screen, expand this note window horizontally. **    Consanguinity:  No  Hereditary cancer genetic testing in blood relatives:  Yes:  Males, but patient is not sure whom   Other than noted, no known history of cancer in relatives depicted in the pedigree or in:  maternal first cousins, maternal extended family, siblings' descendants.  Other than noted, no known family history of benign tumors or of colon polyps.  Unaware of or limited knowledge " "of medical history of:  paternal extended family.    Review of Systems  See HPI.       OBJECTIVE     Gene Variant Review    See Assessment/Plan for information regarding Liz's HOXB13 variant.    NM_002439.5(MSH3):c.1567G>A (p.Ilk065Uyd)  Interpretation:  Benign/Likely benign    NM_002439.5(MSH3):c.2511G>A (p.Zza493=)  Interpretation:  Conflicting interpretations of pathogenicity  Uncertain significance(1); Benign(2); Likely benign(4)    Physical Exam  Significantly limited secondary to the inherent nature of a virtual visit.  Very pleasant patient.  Constitutional      Appearance:  Appears well developed and well nourished. No distress.   Neurological     Mental Status:  Alert and oriented.  Psychiatric         Mood and Affect:  Normal.     Thought Content:  Normal.     Speech:  Normal.     Behavior:  Normal.     Judgment:  Normal.  Genetics-specific     It is my assessment that the patient is ready to proceed with cancer genetic testing from a psychosocial perspective.    ASSESSMENT / PLAN      Liz Coto ("Liz"), 67 y.o., assigned female sex at birth, is new to the [facility] and to me.  She was referred by Dr. Frederick Stockton, with the Gynecologic Oncology department, for post-test genetic counseling given that a genetic variant which in 2020 she was found to carry in her HOXB13 gene has been reclassified from a variant of uncertain significance (VUS) to a clinically significant variant.      Cancer Genetics    Germline cancer-genetic testing is the testing of genes associated with cancer, known as cancer susceptibility genes.  Just as these genes are inherited from parents--one copy of each gene from each parent--mutations in these genes can be inherited, as well.  A mutation in a cancer susceptibility gene adversely affects the gene's ability to prevent cancer; therefore, carriers of cancer susceptibility gene mutations may be at increased risk for " "certain cancers.     Liz's Germline (Hereditary) Cancer-Genetic Test Results    Patient name: Liz Coto ("Liz")   Patient : 1955    Test name: Integrated [employer](R) with [employer](TM) Hereditary Cancer   Test laboratory: [facility]   Specimen type: Blood   Collection date: 2020   Number of genes tested: 35   Genes analyzed: APC, ZEENAT, AXIN2, BARD1, BMPR1A, BRCA1, BRCA2, BRIP1, CDH1, CDK4, CDKN2A, CHEK2, EPCAM, GALNT12, GREM1, HOXB13, MLH1, MSH2, MSH3, MSH6, MUTYH, NBN, NTHL1, PALB2, PMS2, POLD1, POLE, PTEN, RAD51C, RAD51D, RNF43, RPS20, SMAD4, STK11, TP53    See report for limitations and technical specifications.    ORIGINAL REPORT: AMENDED REPORT:   Results date: 2020   RESULTS: Clinically Significant Variants identified:  None     Variants of Uncertain Significance identified:  HOXB13 c.853del (p.*285Lysext*97), heterozygous  MSH3 c.1567G>A (p.Fui228Zbt), heterozygous  MSH3 c.2511G>A (p.Fxz403Xcp), heterozygous Clinically Significant Variants identified:  HOXB13 c.853del (p.*285Lysext*97), heterozygous    Variants of Uncertain Significance identified:  MSH3 c.1567G>A (p.Pul067Dln), heterozygous  MSH3 c.2511G>A (p.Cgq757Mwj), heterozygous     Liz carries a single (heterozygous / monoallelic) clinically significant variant in her HOXB13 gene.  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    Of note, as of 2023, ClinVar classifies this variant as one with conflicting interpretations of pathogenicity:  Uncertain Significance x3 ([facility], [facility], and Women's Health and Genetics/Laboratory [facility], [facility]), and Likely Benign x1 ([facility]).     HOXB13 is a cancer susceptibility gene that, in part, when functioning normally, acts as a tumor suppressor.  When the HOXB13 gene harbors a clinically significant variant, it cannot perform its functions as intended, thereby " predisposing the carrier to conditions associated with the HOXB13 gene.     As a heterozygous carrier of a clinically significant HOXB13 variant, Liz is considered a carrier for UHGI54-cmnusuxfvx prostate cancer risk, which is inherited in an autosomal-dominant pattern.  Individuals have two copies of every gene--a copy from each biological parent.  One mutated copy of a gene is sufficient to predispose an individual to autosomal-dominantly inherited conditions associated with that gene.  Both copies of the gene need to be mutated in order for the individual to develop autosomal-recessively inherited conditions associated with that gene.    This result at this time does not explain Liz's fallopian tube cancer.     TFNR21-Oyrwrbgnby Risks and Risk Management     Condition Inheritance Pattern Risk Information      Prostate cancer Autosomal dominant The X285K variant in HOXB13 is specific to individuals of  ancestry and is associated with a 2.4-fold increase in prostate cancer risk, which roughly translates to an absolute risk of 30%; additionally, this variant appears to be associated with risk for more aggressive disease at an early age; there is currently in sufficient evidence to determine if this variant increases prostate cancer risk in males of other ancestries     General male population risk:  12.5%   None known at present Autosomal recessive N/A         For Liz Coto ( 1955) as of 2023    Condition Managing Provider HOXB13 Risk Management     Intervention Start at Frequency Most Recent Next Due   Prostate cancer risk N/A (Patient is genetically female) Measurement of serum prostate-specific antigen (PSA) Age 40 y Every 1-2 y (Repeat-testing timeline is based on prior exam findings) N/A N/A     Consideration of digital rectal examination (ASHLEY) Age 40 y Every 1-2 y (Repeat-testing timeline is based on prior exam findings) N/A N/A   Reproductive risks N/A (Patient is  "status post- hysterectomy) Counseling for reproductive options, which may include prenatal genetic diagnosis and/or assisted reproduction with pre- implantation genetic testing, is indicated for individuals/couples expressing concern over future offspring's GTWV71-nqqelrjzte risks N/A      HOXB13:  Risks to Relatives     It is believed that this clinically significant HOXB13 variant was inherited from parent; from which parent it was inherited can be confirmed when one parent tests positive for the same HOXB13 variant and, ideally, the other parent tests negative for that variant.     Children and siblings of the clinically significant HOXB13 variant carrier each independently have a 50% chance of inheriting or having inherited the same HOXB13 variant, thereby also having an inherited predisposition to the dominantly inherited HQXF63-fadlsdgjxl conditions.    More distant relatives may also be at risk for carrying the family HOXB13 variant.    Both males and females can carry, be affected by, and pass down this type of genetic variant.    When passed down, these variants are passed directly from parent to child and do not "skip generations."       Relatives of Liz's should strongly consider meeting with a genetic specialist to discuss undergoing testing for the family clinically significant HOXB13 variant.    Nowadays, such testing is often quite affordable.  Testing of relatives, regardless of biological sex, should be considered no earlier than age 18 years but before age 40 years, and earlier to help inform reproductive decision-making prior to conception.  Counseling for reproductive options, which may include prenatal genetic diagnosis and/or assisted reproduction with preimplantation genetic testing, is indicated for individuals/couples expressing concern over future offspring's OOZO20-gskmprytxf risks.      A "HOXB13 Family Notification Letter" has been sent to Liz through her [facility] portal for " her to share with her relatives.     MSH3 Variants of Uncertain Significance    Discussed implications of carrying a pathogenic (harmful / clinically significant) variant, which Liz is not known at this time to carry, in the MSH3 gene.  Liz carries two variants of uncertain significance (VUSs) in her MSH3 gene.  VUSs are genetic changes that have not yet been determined to be either pathogenic or benign.  Most of the time, when VUSs are reclassified, they are reclassified as benign/likely benign.    Causes of Cancer    Only approximately 5%-10% of cancers are caused by an inherited cancer susceptibility gene mutation; rather, the majority of cancers are sporadic.  Causes of sporadic cancers may include environmental risk factors, lifestyle risk factors, and non-modifiable risk factors.  It is important to note that members of a family often share not only their genetics but also other risk factors, including environmental and lifestyle risk factors, so cancers can be familial.     Potential Results of Genetic Testing, and Their Implications     Potential results of genetic testing include positive, negative, and variant of unknown significance (VUS).    Positive:  A positive result indicates the presence of at least one clinically significant gene mutation, and the individual's associated cancer risks vary depending upon the cancer susceptibility gene(s) in which there is/are a mutation(s).  With a positive result, depending upon the specific result and the individual's clinical history, modified risk management may be recommended, including measures for risk reduction and/or surveillance; however, there are not always effective strategies for modified risk management.  Negative:  A negative result indicates that no clinically significant mutations were identified in the gene(s) tested.   VUS:  A VUS indicates that there is not presently enough data for the laboratory to make a determination as to whether  the genetic variant is clinically significant.  VUSs are not typically acted upon clinically.       Genetic Mutation Inheritance     When an individual tests positive for a gene mutation, her first-degree relatives each have a 50% chance of carrying the same mutation, and other, more distant blood relatives can also be at risk of carrying the same mutation.      Genetic Discrimination     Genetic discrimination occurs when individuals are discriminated against on the basis of their genetic information.    The Genetic Information Nondiscrimination Act of 2008 (JOSEPH) is U.S. federal legislation that provides some protections against use of an individual's genetic information by their health insurer and by their employer.      Title I of JOSEPH prohibits most health insurers from utilizing an individual's genetic information to make decisions regarding insurance eligibility or premium charges.  This protection does not apply to health insurance obtained through a job with the , and it is unclear whether it applies to health insurance obtained through the Federal Employees Health Benefits Plan.    Title II of JOSEPH prohibits covered entities, in many cases, from requesting or requiring the genetic information of employees and applicants and from using said information to make employment decisions.  This protection does not apply to employers with fewer than 15 employees or to the .    JOSEPH does not protect individuals from genetic discrimination by any other type of policy or entity, including but not limited to life insurance, disability insurance, long-term care insurance,  benefits, and  Health Services benefits.    Genetic Testing Logistics     An outside laboratory would perform the testing after a blood sample is collected here at the [facility].    There is a potential for the patient to incur out-of-pocket costs related to genetic testing.    One can expect this genetic  testing to take approximately three weeks to result from the time the specimen is collected.    Post-test genetic counseling can be conducted once the genetic testing results are available.     Cancer Genetics Impression    Liz's 35-gene hereditary cancer gene panel has shown that she carries a clinically significant variant in her HOXB13 gene; however, at this time, there are no known associated health risks to biological females.  It is important that Liz share with her family the HOXB13 Family Notification Letter that I have sent her and notify me of results when relatives test.  The abovementioned 35-gene panel also has shown that Liz carries two VUSs in her MSH3 gene.      This test analyzed DNA of 35 genes.  We can consider analyzing RNA of some of the genes and potentially additional genes -- Liz was given the option of and is interested in pursuing this.  She is aware of the low likelihood of yield from such testing and that the benefits of RNA analysis, although rare, may include VUS classification and/or detection of a mutation not detectable on DNA analysis alone.    Given she wishes to proceed with updated cancer-genetic testing, provided germline cancer genetic test options of focused panel versus broad panel, and Liz opts for the latter and specifically agrees to proceed with Rakesh's 91-gene, multiple-cancer panel.        Diagnoses and Orders for This Encounter:    ICD-10-CM ICD-9-CM   1. Encounter for nonprocreative genetic counseling  Z71.83 V26.33   2. Mutation in HOXB13 gene  Z15.03 V84.03   3. History of cancer of fallopian tube in adulthood  Z85.44 V10.44   4. Family history of breast cancer  Z80.3 V16.3   5. Family history of colon cancer  Z80.0 V16.0   6. At increased risk of breast cancer  Z91.89 V15.89     1. Encounter for nonprocreative genetic counseling  - Cancer genetic risk assessment and pre- and post-test cancer-genetic counseling were conducted.      -  Performing updated germline cancer-genetic testing:  Genetic Test Information:  Testing lab: [facility]   Test panel: 9510 x91 genes + RNAinsight (Core/Primary panel:  BRCA)   ICD-10 code(s): Z85.44   Z80.3   Z80.0      Verbal informed consent: Obtained   Written informed consent: To be obtained   Specimen type: Blood  (Patient denies blood disorders that would necessitate a skin fibroblast specimen)   Specimen collection by: [facility]   Specimen collection date: Monday, 04/03/2023 (10 AM; [facility])   Results expected by: Approximately 2-3 weeks after the genetic testing lab receives the specimen   Post-test genetic counseling: Virtual visit on Tuesday, 05/09/2023, at 3:30 PM  (May cancel if results unchanged from today)       2. Mutation in HOXB13 gene  - Ambulatory referral/consult to Genetics:  COMPLETED 03/30/2023    3. History of cancer of fallopian tube in adulthood  - See #1    4. Family history of breast cancer  - See #1    5. Family history of colon cancer  - See #1    6. At increased risk of breast cancer  - Ambulatory referral/consult to Breast Surgery; Future ([facility])  Breast Cancer Risk Stratification:   Current, Estimated Breast Cancer Risk Model Used Patient's Score Patient's Risk Category   5-year Amber Model 2.1%-3%  [] N/A given age <35   [] Average risk (<1.7%)   [x] Increased risk (?1.7%)   10-year Tyrer-Cuzick v8.0b 10.7%  [] <5%   [x] ?5% (Chemoprevention recommended if not otherwise contraindicated)   Lifetime (to age 85) Tyrer-Cuzick v8.0b 18.4%  [] Average risk (<15%)   [x] Intermediate risk (?15% - <20%)   [] Increased risk (?20%)         Questions were encouraged and answered to the patient's satisfaction, and she verbalized understanding of the information and agreement with the plan.       References:  ANAHY Varela., & Jonas, P. S. (2023 Jan). Genetic risk factors for prostate cancer. In LAURENCE. ERIKA Darden, MARIAH Johnson, & XANDER Lange (Eds.), UpToDate.    JENS Florez (2018 Oct 23). HOMEOBOX B13; HOXB13. Online Mendelian Inheritance in Man. Retrieved on February 7, 2023, from https://www.omim.org/entry/583658?search=hoxb13&highlight=hoxb13  VIN Currie, JOSE Aldana, TIFFANY Guerrero, ADRY Dejesus, XANDER Soliman, KIMO Newman., TYLER Hartman, ERIKA Mcdonald., TAWANNA Yung., JOSE Flores, & MARIAH Nix (2022). The HOXB13 variant X285K is associated with clinical significance and early age at diagnosis in  prostate cancer patients. [place] journal of cancer, 126(5), 791-796. https://doi.org/10.1038/j33586-228-52572-8  National Center for Biotechnology Information. ClinVar; [UPU029600493.18], https://www.ncbi.nlm.nih.gov/clinvar/variation/ICB808922940.18 (accessed March 30, 2023).  National Center for Biotechnology Information. ClinVar; [UIQ007121706.30], https://www.ncbi.nlm.nih.gov/clinvar/variation/SVL523390594.30 (accessed March 30, 2023).  National Center for Biotechnology Information. ClinVar; [UZU263529805.24], https://www.ncbi.nlm.nih.gov/clinvar/variation/IZH401593605.24 (accessed March 30, 2023).  National Comprehensive Cancer Network (NCCN). (2023). Prostate cancer early detection. NCCN Clinical Practice Guidelines in Oncology (NCCN Guidelines), Version 1.2023.  National Library of Medicine (NLM). (2015 Apr 01). HOXB13 gene. MedlinePlus. Retrieved on February 7, 2023, from  https://medlineplus.gov/genetics/gene/hoxb13/        Arron Swartz, DNP, APRN, FNP-BC, AOCNP, CGRA  Nurse Practitioner, Hereditary and High-Risk Clinic  Department of Hematology and Oncology  [facility]        CC:  Dr. Frederick Stockton

## 2023-03-30 NOTE — Clinical Note
Concepcion, please schedule (pt aware/notified):   1. Sample collection & consent signing:  Mon 4/3, 10 am 2. Post-test: Tues 5/9, 3:30pm, virtual 30 minutes   Thank you!

## 2023-04-03 ENCOUNTER — CLINICAL SUPPORT (OUTPATIENT)
Dept: HEMATOLOGY/ONCOLOGY | Facility: CLINIC | Age: 68
End: 2023-04-03
Payer: COMMERCIAL

## 2023-04-03 ENCOUNTER — LAB VISIT (OUTPATIENT)
Dept: LAB | Facility: HOSPITAL | Age: 68
End: 2023-04-03
Payer: COMMERCIAL

## 2023-04-03 ENCOUNTER — PATIENT MESSAGE (OUTPATIENT)
Dept: HEMATOLOGY/ONCOLOGY | Facility: CLINIC | Age: 68
End: 2023-04-03

## 2023-04-03 DIAGNOSIS — Z85.44 HISTORY OF MALIGNANT NEOPLASM OF FALLOPIAN TUBE IN ADULTHOOD: ICD-10-CM

## 2023-04-03 DIAGNOSIS — Z85.44 HISTORY OF MALIGNANT NEOPLASM OF FALLOPIAN TUBE IN ADULTHOOD: Primary | ICD-10-CM

## 2023-04-03 DIAGNOSIS — Z80.0 FAMILY HISTORY OF COLON CANCER: ICD-10-CM

## 2023-04-03 DIAGNOSIS — Z80.3 FAMILY HISTORY OF BREAST CANCER: ICD-10-CM

## 2023-04-03 PROCEDURE — 36415 COLL VENOUS BLD VENIPUNCTURE: CPT | Performed by: NURSE PRACTITIONER

## 2023-04-03 NOTE — PROGRESS NOTES
Met with the patient to review and sign consents for genetic testing [facility]. Reviewed follow up timeline and procedures, and escorted the patient to the lobby.

## 2023-04-24 ENCOUNTER — IMMUNIZATION (OUTPATIENT)
Dept: PHARMACY | Facility: CLINIC | Age: 68
End: 2023-04-24
Payer: COMMERCIAL

## 2023-05-01 ENCOUNTER — PATIENT MESSAGE (OUTPATIENT)
Dept: HEMATOLOGY/ONCOLOGY | Facility: CLINIC | Age: 68
End: 2023-05-01
Payer: COMMERCIAL

## 2023-05-09 PROBLEM — Z15.89 MONOALLELIC MUTATION OF CFTR GENE: Status: ACTIVE | Noted: 2023-05-09

## 2023-05-11 ENCOUNTER — PATIENT MESSAGE (OUTPATIENT)
Dept: HEMATOLOGY/ONCOLOGY | Facility: CLINIC | Age: 68
End: 2023-05-11
Payer: COMMERCIAL

## 2023-06-12 ENCOUNTER — TELEPHONE (OUTPATIENT)
Dept: HEMATOLOGY/ONCOLOGY | Facility: CLINIC | Age: 68
End: 2023-06-12
Payer: COMMERCIAL

## 2023-06-12 NOTE — TELEPHONE ENCOUNTER
Contacted patient to schedule post-test visit. She does not want to schedule at the moment and will call back when she is able to. Provider notified.

## 2023-07-28 ENCOUNTER — TELEPHONE (OUTPATIENT)
Dept: PRIMARY CARE CLINIC | Facility: CLINIC | Age: 68
End: 2023-07-28
Payer: COMMERCIAL

## 2023-07-28 DIAGNOSIS — Z12.31 SCREENING MAMMOGRAM, ENCOUNTER FOR: Primary | ICD-10-CM

## 2023-07-28 NOTE — TELEPHONE ENCOUNTER
----- Message from Beverly Valenzuela sent at 7/28/2023 11:30 AM CDT -----  Caller is requesting to schedule their annual screening mammogram appointment. Order is not listed in Epic.  Please enter order and contact patient to schedule.  Would the patient like a call back, or a response through their [facility] portal?:    callback

## 2023-08-17 ENCOUNTER — HOSPITAL ENCOUNTER (OUTPATIENT)
Dept: RADIOLOGY | Facility: HOSPITAL | Age: 68
Discharge: HOME | End: 2023-08-17
Attending: INTERNAL MEDICINE
Payer: COMMERCIAL

## 2023-08-17 VITALS — WEIGHT: 164.44 LBS | HEIGHT: 65 IN | BODY MASS INDEX: 27.4 KG/M2

## 2023-08-17 DIAGNOSIS — Z12.31 SCREENING MAMMOGRAM, ENCOUNTER FOR: ICD-10-CM

## 2023-08-17 PROCEDURE — 77067 SCR MAMMO BI INCL CAD: CPT | Mod: TC

## 2023-08-17 PROCEDURE — 77063 MAMMO DIGITAL SCREENING BILAT WITH TOMO: ICD-10-PCS | Mod: 26,,, | Performed by: RADIOLOGY

## 2023-08-17 PROCEDURE — 77063 BREAST TOMOSYNTHESIS BI: CPT | Mod: 26,,, | Performed by: RADIOLOGY

## 2023-08-17 PROCEDURE — 77067 SCR MAMMO BI INCL CAD: CPT | Mod: 26,,, | Performed by: RADIOLOGY

## 2023-08-17 PROCEDURE — 77067 MAMMO DIGITAL SCREENING BILAT WITH TOMO: ICD-10-PCS | Mod: 26,,, | Performed by: RADIOLOGY

## 2023-09-06 ENCOUNTER — PATIENT MESSAGE (OUTPATIENT)
Dept: PRIMARY CARE CLINIC | Facility: CLINIC | Age: 68
End: 2023-09-06
Payer: COMMERCIAL

## 2023-09-11 ENCOUNTER — LAB VISIT (OUTPATIENT)
Dept: LAB | Facility: HOSPITAL | Age: 68
End: 2023-09-11
Payer: COMMERCIAL

## 2023-09-11 ENCOUNTER — OFFICE VISIT (OUTPATIENT)
Dept: GYNECOLOGIC ONCOLOGY | Facility: CLINIC | Age: 68
End: 2023-09-11
Payer: COMMERCIAL

## 2023-09-11 VITALS
BODY MASS INDEX: 28.07 KG/M2 | HEART RATE: 62 BPM | WEIGHT: 168.63 LBS | SYSTOLIC BLOOD PRESSURE: 102 MMHG | DIASTOLIC BLOOD PRESSURE: 70 MMHG

## 2023-09-11 DIAGNOSIS — Z15.03 MUTATION IN HOXB13 GENE: ICD-10-CM

## 2023-09-11 DIAGNOSIS — C57.01 FALLOPIAN TUBE CANCER, CARCINOMA, RIGHT: ICD-10-CM

## 2023-09-11 DIAGNOSIS — C57.01 FALLOPIAN TUBE CANCER, CARCINOMA, RIGHT: Primary | ICD-10-CM

## 2023-09-11 LAB — CANCER AG125 SERPL-ACNC: 11 U/ML (ref 0–30)

## 2023-09-11 PROCEDURE — 86304 IMMUNOASSAY TUMOR CA 125: CPT | Performed by: NURSE PRACTITIONER

## 2023-09-11 PROCEDURE — 3074F PR MOST RECENT SYSTOLIC BLOOD PRESSURE < 130 MM HG: ICD-10-PCS | Mod: CPTII,S$GLB,, | Performed by: OBSTETRICS & GYNECOLOGY

## 2023-09-11 PROCEDURE — 99999 PR PBB SHADOW E&M-EST. PATIENT-LVL III: CPT | Mod: PBBFAC,,, | Performed by: OBSTETRICS & GYNECOLOGY

## 2023-09-11 PROCEDURE — 1159F MED LIST DOCD IN RCRD: CPT | Mod: CPTII,S$GLB,, | Performed by: OBSTETRICS & GYNECOLOGY

## 2023-09-11 PROCEDURE — 1101F PR PT FALLS ASSESS DOC 0-1 FALLS W/OUT INJ PAST YR: ICD-10-PCS | Mod: CPTII,S$GLB,, | Performed by: OBSTETRICS & GYNECOLOGY

## 2023-09-11 PROCEDURE — 1126F PR PAIN SEVERITY QUANTIFIED, NO PAIN PRESENT: ICD-10-PCS | Mod: CPTII,S$GLB,, | Performed by: OBSTETRICS & GYNECOLOGY

## 2023-09-11 PROCEDURE — 1159F PR MEDICATION LIST DOCUMENTED IN MEDICAL RECORD: ICD-10-PCS | Mod: CPTII,S$GLB,, | Performed by: OBSTETRICS & GYNECOLOGY

## 2023-09-11 PROCEDURE — 3078F PR MOST RECENT DIASTOLIC BLOOD PRESSURE < 80 MM HG: ICD-10-PCS | Mod: CPTII,S$GLB,, | Performed by: OBSTETRICS & GYNECOLOGY

## 2023-09-11 PROCEDURE — 99999 PR PBB SHADOW E&M-EST. PATIENT-LVL III: ICD-10-PCS | Mod: PBBFAC,,, | Performed by: OBSTETRICS & GYNECOLOGY

## 2023-09-11 PROCEDURE — 3288F FALL RISK ASSESSMENT DOCD: CPT | Mod: CPTII,S$GLB,, | Performed by: OBSTETRICS & GYNECOLOGY

## 2023-09-11 PROCEDURE — 3074F SYST BP LT 130 MM HG: CPT | Mod: CPTII,S$GLB,, | Performed by: OBSTETRICS & GYNECOLOGY

## 2023-09-11 PROCEDURE — 3288F PR FALLS RISK ASSESSMENT DOCUMENTED: ICD-10-PCS | Mod: CPTII,S$GLB,, | Performed by: OBSTETRICS & GYNECOLOGY

## 2023-09-11 PROCEDURE — 3008F BODY MASS INDEX DOCD: CPT | Mod: CPTII,S$GLB,, | Performed by: OBSTETRICS & GYNECOLOGY

## 2023-09-11 PROCEDURE — 99213 PR OFFICE/OUTPT VISIT, EST, LEVL III, 20-29 MIN: ICD-10-PCS | Mod: S$GLB,,, | Performed by: OBSTETRICS & GYNECOLOGY

## 2023-09-11 PROCEDURE — 3008F PR BODY MASS INDEX (BMI) DOCUMENTED: ICD-10-PCS | Mod: CPTII,S$GLB,, | Performed by: OBSTETRICS & GYNECOLOGY

## 2023-09-11 PROCEDURE — 3078F DIAST BP <80 MM HG: CPT | Mod: CPTII,S$GLB,, | Performed by: OBSTETRICS & GYNECOLOGY

## 2023-09-11 PROCEDURE — 1101F PT FALLS ASSESS-DOCD LE1/YR: CPT | Mod: CPTII,S$GLB,, | Performed by: OBSTETRICS & GYNECOLOGY

## 2023-09-11 PROCEDURE — 36415 COLL VENOUS BLD VENIPUNCTURE: CPT | Performed by: NURSE PRACTITIONER

## 2023-09-11 PROCEDURE — 99213 OFFICE O/P EST LOW 20 MIN: CPT | Mod: S$GLB,,, | Performed by: OBSTETRICS & GYNECOLOGY

## 2023-09-11 PROCEDURE — 1126F AMNT PAIN NOTED NONE PRSNT: CPT | Mod: CPTII,S$GLB,, | Performed by: OBSTETRICS & GYNECOLOGY

## 2023-09-11 NOTE — PROGRESS NOTES
REFERRING PROVIDER  Pérez Shukla MD     INTERVAL HISTORY  CC: stage [id number] grade 3 right fallopian tube adenocarcinoma.   Liz Coto is a 68 y.o. 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 Shukla.  I assumed her surveillance care with Dr. Shukla's [facility].  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      She has no vaginal bleeding or discharge.  She is having no nausea or vomiting.  She has no bowel or bladder complaints, though her bowel movements are not quite as regular as they used to be.  She has no pain issues.  She feels good and has no other concerns. 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     HPI or ONCOLOGIC HISTORY  10/8/2019  1097     2019 HOLLY/BSO/small bowel resection/resection of PA sherry mass and omentectomy.   PATHOLOGY:  FIGO stage [id number] grade 3 right fallopian tube adenocarcinoma with retroperitoneal nodes. 2 omental nodes also found. No gross omental involvement. No residual disease.      2019 - 4/15/2020 Carbo Taxotere x 6 (First cycle single agent carbo due to taxol reaction)   : 1097>20(before C1)>13(p C3)>15(C6)>14.       Myriad myRisk:   Clinically Significant:  HOXB13 c.853del (p.*285Lysext*97)  VUS:  MSH3 c.1567G>A (p.Exi490Lbp)  MSH3 c.2511G>A (p.Nnh016Oir)     Of Note:  Patient's maternal grandfather  of prostate cancer and her brother is undergoing chemo for metastatic prostate cancer.  She has 2 other younger brothers and about 10 first cousins on her mother's side.     REVIEW OF SYSTEMS  Review of Systems   Constitutional:  Negative for appetite change, chills, fatigue, fever and unexpected weight change.   HENT:   Negative for lump/mass and mouth sores.    Respiratory:  Negative for chest tightness, cough and shortness of  breath.    Cardiovascular:  Negative for chest pain, leg swelling and palpitations.   Gastrointestinal:  Negative for abdominal distention, abdominal pain, blood in stool, constipation, diarrhea, nausea and vomiting.   Genitourinary:  Negative for dysuria, frequency, vaginal bleeding and vaginal discharge.    Musculoskeletal:  Negative for arthralgias and myalgias.   Skin:  Negative for rash.   Neurological:  Negative for dizziness, light-headedness and numbness.   Hematological:  Negative for adenopathy.   Psychiatric/Behavioral:  Negative for decreased concentration, depression and sleep disturbance. The patient is not nervous/anxious.      OBJECTIVE   Vitals:    09/11/23 1007   BP: 102/70   Pulse: 62      Body mass index is 28.07 kg/m².     Physical Exam:   Constitutional: She is oriented to person, place, and time. She appears well-developed and well-nourished. No distress.    HENT:   Head: Normocephalic and atraumatic.    Eyes: Conjunctivae and EOM are normal.      Pulmonary/Chest: Effort normal. No respiratory distress.        Abdominal: Soft. She exhibits no distension and no mass. There is no abdominal tenderness. There is no rebound and no guarding. No hernia.     Genitourinary:    Genitourinary Comments: Vulva - normal  Vagina - no lesions, vaginal cuff well healed  Cervix - surgically absent  Uterus - surgically absent  Adnexa - no masses                 Neurological: She is alert and oriented to person, place, and time.    Skin: No rash noted.    Psychiatric: She has a normal mood and affect. Her behavior is normal.     ECOG status: 0    LABORATORY DATA  Lab data reviewed.    RADIOLOGICAL DATA  Radiology data reviewed.    PATHOLOGY DATA  Pathology data reviewed.    ASSESSMENT    1. Fallopian tube cancer, carcinoma, right    2. 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  She remains HIGINIO.  She will continue with routine surveillance and will contact us promptly if she  has any problems or questions in the interim.       Per SGO 2017 Guidelines, f/u is every 3 months for the first 2 years, and then every 6 months for years 3 to 5.   is optional during this time with  and imaging done for suspected recurrence.        PLAN  Follow-up in 6 months    Follow-up today's         Frederick Stockton MD

## 2023-11-18 ENCOUNTER — PATIENT MESSAGE (OUTPATIENT)
Dept: HEMATOLOGY/ONCOLOGY | Facility: CLINIC | Age: 68
End: 2023-11-18
Payer: COMMERCIAL

## 2023-11-20 ENCOUNTER — PATIENT MESSAGE (OUTPATIENT)
Dept: HEMATOLOGY/ONCOLOGY | Facility: CLINIC | Age: 68
End: 2023-11-20
Payer: COMMERCIAL

## 2023-11-20 LAB
GENETIC COUNSELING?: YES
GENSO SPECIMEN TYPE: NORMAL
MISCELLANEOUS GENETIC TEST NAME: NORMAL
PARTENTAL OR SIBLING TESTING?: NO
REFERENCE LAB: NORMAL
TEST RESULT: NORMAL

## 2023-11-21 PROBLEM — Z14.8 CARRIER OF GENETIC DISORDER: Status: ACTIVE | Noted: 2023-11-21

## 2023-11-21 PROBLEM — Z15.89 MONOALLELIC MUTATION OF CFTR GENE: Chronic | Status: ACTIVE | Noted: 2023-05-09

## 2023-11-21 PROBLEM — Z14.8 CARRIER OF GENETIC DISORDER: Chronic | Status: ACTIVE | Noted: 2023-11-21

## 2024-02-05 ENCOUNTER — OFFICE VISIT (OUTPATIENT)
Dept: PRIMARY CARE CLINIC | Facility: CLINIC | Age: 69
End: 2024-02-05
Payer: COMMERCIAL

## 2024-02-05 ENCOUNTER — LAB VISIT (OUTPATIENT)
Dept: LAB | Facility: HOSPITAL | Age: 69
End: 2024-02-05
Attending: INTERNAL MEDICINE
Payer: COMMERCIAL

## 2024-02-05 VITALS
HEIGHT: 65 IN | SYSTOLIC BLOOD PRESSURE: 110 MMHG | TEMPERATURE: 98 F | DIASTOLIC BLOOD PRESSURE: 70 MMHG | BODY MASS INDEX: 29.02 KG/M2 | OXYGEN SATURATION: 99 % | WEIGHT: 174.19 LBS | HEART RATE: 66 BPM

## 2024-02-05 DIAGNOSIS — Z00.00 ROUTINE MEDICAL EXAM: ICD-10-CM

## 2024-02-05 DIAGNOSIS — Z13.1 SCREENING FOR DIABETES MELLITUS: ICD-10-CM

## 2024-02-05 DIAGNOSIS — Z00.00 ROUTINE MEDICAL EXAM: Primary | ICD-10-CM

## 2024-02-05 DIAGNOSIS — Z23 NEED FOR COVID-19 VACCINE: ICD-10-CM

## 2024-02-05 DIAGNOSIS — M85.80 OSTEOPENIA, UNSPECIFIED LOCATION: ICD-10-CM

## 2024-02-05 PROBLEM — T45.1X5A CHEMOTHERAPY-INDUCED NAUSEA: Status: RESOLVED | Noted: 2019-11-22 | Resolved: 2024-02-05

## 2024-02-05 PROBLEM — Z85.44 HISTORY OF CANCER OF FALLOPIAN TUBE IN ADULTHOOD: Status: ACTIVE | Noted: 2019-11-20

## 2024-02-05 PROBLEM — R11.0 CHEMOTHERAPY-INDUCED NAUSEA: Status: RESOLVED | Noted: 2019-11-22 | Resolved: 2024-02-05

## 2024-02-05 PROBLEM — T45.1X5A CHEMOTHERAPY INDUCED NAUSEA AND VOMITING: Status: RESOLVED | Noted: 2020-02-12 | Resolved: 2024-02-05

## 2024-02-05 PROBLEM — R11.2 CHEMOTHERAPY INDUCED NAUSEA AND VOMITING: Status: RESOLVED | Noted: 2020-02-12 | Resolved: 2024-02-05

## 2024-02-05 LAB
25(OH)D3+25(OH)D2 SERPL-MCNC: 28 NG/ML (ref 30–96)
ALBUMIN SERPL BCP-MCNC: 3.6 G/DL (ref 3.5–5.2)
ALP SERPL-CCNC: 73 U/L (ref 55–135)
ALT SERPL W/O P-5'-P-CCNC: 19 U/L (ref 10–44)
ANION GAP SERPL CALC-SCNC: 7 MMOL/L (ref 8–16)
AST SERPL-CCNC: 26 U/L (ref 10–40)
BASOPHILS # BLD AUTO: 0.03 K/UL (ref 0–0.2)
BASOPHILS NFR BLD: 0.5 % (ref 0–1.9)
BILIRUB SERPL-MCNC: 0.5 MG/DL (ref 0.1–1)
BILIRUB UR QL STRIP: NEGATIVE
BUN SERPL-MCNC: 17 MG/DL (ref 8–23)
CALCIUM SERPL-MCNC: 10.1 MG/DL (ref 8.7–10.5)
CHLORIDE SERPL-SCNC: 104 MMOL/L (ref 95–110)
CHOLEST SERPL-MCNC: 225 MG/DL (ref 120–199)
CHOLEST/HDLC SERPL: 3.3 {RATIO} (ref 2–5)
CLARITY UR REFRACT.AUTO: CLEAR
CO2 SERPL-SCNC: 27 MMOL/L (ref 23–29)
COLOR UR AUTO: YELLOW
CREAT SERPL-MCNC: 1.1 MG/DL (ref 0.5–1.4)
DIFFERENTIAL METHOD BLD: NORMAL
EOSINOPHIL # BLD AUTO: 0.2 K/UL (ref 0–0.5)
EOSINOPHIL NFR BLD: 3.3 % (ref 0–8)
ERYTHROCYTE [DISTWIDTH] IN BLOOD BY AUTOMATED COUNT: 13.9 % (ref 11.5–14.5)
EST. GFR  (NO RACE VARIABLE): 55.4 ML/MIN/1.73 M^2
ESTIMATED AVG GLUCOSE: 111 MG/DL (ref 68–131)
GLUCOSE SERPL-MCNC: 76 MG/DL (ref 70–110)
GLUCOSE UR QL STRIP: NEGATIVE
HBA1C MFR BLD: 5.5 % (ref 4–5.6)
HCT VFR BLD AUTO: 42.2 % (ref 37–48.5)
HDLC SERPL-MCNC: 69 MG/DL (ref 40–75)
HDLC SERPL: 30.7 % (ref 20–50)
HGB BLD-MCNC: 13.5 G/DL (ref 12–16)
HGB UR QL STRIP: NEGATIVE
IMM GRANULOCYTES # BLD AUTO: 0.02 K/UL (ref 0–0.04)
IMM GRANULOCYTES NFR BLD AUTO: 0.3 % (ref 0–0.5)
KETONES UR QL STRIP: NEGATIVE
LDLC SERPL CALC-MCNC: 143.2 MG/DL (ref 63–159)
LEUKOCYTE ESTERASE UR QL STRIP: NEGATIVE
LYMPHOCYTES # BLD AUTO: 2.9 K/UL (ref 1–4.8)
LYMPHOCYTES NFR BLD: 45.6 % (ref 18–48)
MCH RBC QN AUTO: 30.3 PG (ref 27–31)
MCHC RBC AUTO-ENTMCNC: 32 G/DL (ref 32–36)
MCV RBC AUTO: 95 FL (ref 82–98)
MONOCYTES # BLD AUTO: 0.7 K/UL (ref 0.3–1)
MONOCYTES NFR BLD: 10.3 % (ref 4–15)
NEUTROPHILS # BLD AUTO: 2.5 K/UL (ref 1.8–7.7)
NEUTROPHILS NFR BLD: 40 % (ref 38–73)
NITRITE UR QL STRIP: NEGATIVE
NONHDLC SERPL-MCNC: 156 MG/DL
NRBC BLD-RTO: 0 /100 WBC
PH UR STRIP: 6 [PH] (ref 5–8)
PLATELET # BLD AUTO: 366 K/UL (ref 150–450)
PMV BLD AUTO: 10.8 FL (ref 9.2–12.9)
POTASSIUM SERPL-SCNC: 4.6 MMOL/L (ref 3.5–5.1)
PROT SERPL-MCNC: 7.4 G/DL (ref 6–8.4)
PROT UR QL STRIP: NEGATIVE
RBC # BLD AUTO: 4.45 M/UL (ref 4–5.4)
SODIUM SERPL-SCNC: 138 MMOL/L (ref 136–145)
SP GR UR STRIP: 1.02 (ref 1–1.03)
TRIGL SERPL-MCNC: 64 MG/DL (ref 30–150)
URN SPEC COLLECT METH UR: NORMAL
WBC # BLD AUTO: 6.34 K/UL (ref 3.9–12.7)

## 2024-02-05 PROCEDURE — 3008F BODY MASS INDEX DOCD: CPT | Mod: CPTII,S$GLB,, | Performed by: INTERNAL MEDICINE

## 2024-02-05 PROCEDURE — 83036 HEMOGLOBIN GLYCOSYLATED A1C: CPT | Performed by: INTERNAL MEDICINE

## 2024-02-05 PROCEDURE — 81003 URINALYSIS AUTO W/O SCOPE: CPT | Performed by: INTERNAL MEDICINE

## 2024-02-05 PROCEDURE — 1160F RVW MEDS BY RX/DR IN RCRD: CPT | Mod: CPTII,S$GLB,, | Performed by: INTERNAL MEDICINE

## 2024-02-05 PROCEDURE — 82306 VITAMIN D 25 HYDROXY: CPT | Performed by: INTERNAL MEDICINE

## 2024-02-05 PROCEDURE — 80053 COMPREHEN METABOLIC PANEL: CPT | Performed by: INTERNAL MEDICINE

## 2024-02-05 PROCEDURE — 99999 PR PBB SHADOW E&M-EST. PATIENT-LVL IV: CPT | Mod: PBBFAC,,, | Performed by: INTERNAL MEDICINE

## 2024-02-05 PROCEDURE — 3078F DIAST BP <80 MM HG: CPT | Mod: CPTII,S$GLB,, | Performed by: INTERNAL MEDICINE

## 2024-02-05 PROCEDURE — 99397 PER PM REEVAL EST PAT 65+ YR: CPT | Mod: S$GLB,,, | Performed by: INTERNAL MEDICINE

## 2024-02-05 PROCEDURE — 3074F SYST BP LT 130 MM HG: CPT | Mod: CPTII,S$GLB,, | Performed by: INTERNAL MEDICINE

## 2024-02-05 PROCEDURE — 36415 COLL VENOUS BLD VENIPUNCTURE: CPT | Mod: PN | Performed by: INTERNAL MEDICINE

## 2024-02-05 PROCEDURE — 80061 LIPID PANEL: CPT | Performed by: INTERNAL MEDICINE

## 2024-02-05 PROCEDURE — 1101F PT FALLS ASSESS-DOCD LE1/YR: CPT | Mod: CPTII,S$GLB,, | Performed by: INTERNAL MEDICINE

## 2024-02-05 PROCEDURE — 1126F AMNT PAIN NOTED NONE PRSNT: CPT | Mod: CPTII,S$GLB,, | Performed by: INTERNAL MEDICINE

## 2024-02-05 PROCEDURE — 3288F FALL RISK ASSESSMENT DOCD: CPT | Mod: CPTII,S$GLB,, | Performed by: INTERNAL MEDICINE

## 2024-02-05 PROCEDURE — 85025 COMPLETE CBC W/AUTO DIFF WBC: CPT | Performed by: INTERNAL MEDICINE

## 2024-02-05 PROCEDURE — 1159F MED LIST DOCD IN RCRD: CPT | Mod: CPTII,S$GLB,, | Performed by: INTERNAL MEDICINE
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
## 2024-02-07 ENCOUNTER — PATIENT MESSAGE (OUTPATIENT)
Dept: PRIMARY CARE CLINIC | Facility: CLINIC | Age: 69
End: 2024-02-07
Payer: COMMERCIAL

## 2024-02-07 DIAGNOSIS — E55.9 VITAMIN D DEFICIENCY: ICD-10-CM

## 2024-02-07 RX ORDER — ERGOCALCIFEROL 1.25 MG/1
50000 CAPSULE ORAL
Qty: 12 CAPSULE | Refills: 3 | Status: SHIPPED | OUTPATIENT
Start: 2024-02-07

## 2024-03-11 ENCOUNTER — OFFICE VISIT (OUTPATIENT)
Dept: GYNECOLOGIC ONCOLOGY | Facility: CLINIC | Age: 69
End: 2024-03-11
Payer: COMMERCIAL

## 2024-03-11 ENCOUNTER — TELEPHONE (OUTPATIENT)
Dept: GYNECOLOGIC ONCOLOGY | Facility: CLINIC | Age: 69
End: 2024-03-11

## 2024-03-11 ENCOUNTER — LAB VISIT (OUTPATIENT)
Dept: LAB | Facility: HOSPITAL | Age: 69
End: 2024-03-11
Payer: COMMERCIAL

## 2024-03-11 VITALS
WEIGHT: 169 LBS | BODY MASS INDEX: 28.16 KG/M2 | HEART RATE: 66 BPM | SYSTOLIC BLOOD PRESSURE: 114 MMHG | DIASTOLIC BLOOD PRESSURE: 78 MMHG | HEIGHT: 65 IN

## 2024-03-11 DIAGNOSIS — Z15.03 MUTATION IN HOXB13 GENE: ICD-10-CM

## 2024-03-11 DIAGNOSIS — C57.01 FALLOPIAN TUBE CANCER, CARCINOMA, RIGHT: ICD-10-CM

## 2024-03-11 DIAGNOSIS — C57.01 FALLOPIAN TUBE CANCER, CARCINOMA, RIGHT: Primary | ICD-10-CM

## 2024-03-11 LAB — CANCER AG125 SERPL-ACNC: 11 U/ML (ref 0–30)

## 2024-03-11 PROCEDURE — 1126F AMNT PAIN NOTED NONE PRSNT: CPT | Mod: CPTII,S$GLB,, | Performed by: OBSTETRICS & GYNECOLOGY

## 2024-03-11 PROCEDURE — 3044F HG A1C LEVEL LT 7.0%: CPT | Mod: CPTII,S$GLB,, | Performed by: OBSTETRICS & GYNECOLOGY

## 2024-03-11 PROCEDURE — 99213 OFFICE O/P EST LOW 20 MIN: CPT | Mod: S$GLB,,, | Performed by: OBSTETRICS & GYNECOLOGY

## 2024-03-11 PROCEDURE — 1101F PT FALLS ASSESS-DOCD LE1/YR: CPT | Mod: CPTII,S$GLB,, | Performed by: OBSTETRICS & GYNECOLOGY

## 2024-03-11 PROCEDURE — 3078F DIAST BP <80 MM HG: CPT | Mod: CPTII,S$GLB,, | Performed by: OBSTETRICS & GYNECOLOGY

## 2024-03-11 PROCEDURE — 1159F MED LIST DOCD IN RCRD: CPT | Mod: CPTII,S$GLB,, | Performed by: OBSTETRICS & GYNECOLOGY

## 2024-03-11 PROCEDURE — 3008F BODY MASS INDEX DOCD: CPT | Mod: CPTII,S$GLB,, | Performed by: OBSTETRICS & GYNECOLOGY

## 2024-03-11 PROCEDURE — 86304 IMMUNOASSAY TUMOR CA 125: CPT | Performed by: OBSTETRICS & GYNECOLOGY

## 2024-03-11 PROCEDURE — 36415 COLL VENOUS BLD VENIPUNCTURE: CPT | Performed by: OBSTETRICS & GYNECOLOGY

## 2024-03-11 PROCEDURE — 99999 PR PBB SHADOW E&M-EST. PATIENT-LVL III: CPT | Mod: PBBFAC,,, | Performed by: OBSTETRICS & GYNECOLOGY

## 2024-03-11 PROCEDURE — 3074F SYST BP LT 130 MM HG: CPT | Mod: CPTII,S$GLB,, | Performed by: OBSTETRICS & GYNECOLOGY

## 2024-03-11 PROCEDURE — 3288F FALL RISK ASSESSMENT DOCD: CPT | Mod: CPTII,S$GLB,, | Performed by: OBSTETRICS & GYNECOLOGY

## 2024-03-11 NOTE — PROGRESS NOTES
REFERRING PROVIDER  Pérez Shukla MD     INTERVAL HISTORY  CC: stage [id number] grade 3 right fallopian tube adenocarcinoma.   Liz Coto is a 68 y.o. 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 Shukla.  I assumed her surveillance care with Dr. Shukla's [facility].  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      She has no vaginal bleeding or discharge.  She is having no nausea or vomiting.  She has no bowel or bladder complaints, though her bowel movements are not quite as regular as they used to be.  She has no pain issues.  She feels good and has no other concerns.     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     HPI or ONCOLOGIC HISTORY  10/8/2019  1097     2019 HOLLY/BSO/small bowel resection/resection of PA sherry mass and omentectomy.   PATHOLOGY:  FIGO stage [id number] grade 3 right fallopian tube adenocarcinoma with retroperitoneal nodes. 2 omental nodes also found. No gross omental involvement. No residual disease.      2019 - 4/15/2020 Carbo Taxotere x 6 (First cycle single agent carbo due to taxol reaction)   : 1097>20(before C1)>13(p C3)>15(C6)>14.       Myriad myRisk:   Clinically Significant:  HOXB13 c.853del (p.*285Lysext*97)  VUS:  MSH3 c.1567G>A (p.Idc035Ioc)  MSH3 c.2511G>A (p.[id number])    2023  = 11     Of Note:  Patient's maternal grandfather  of prostate cancer and her brother is undergoing chemo for metastatic prostate cancer.  She has 2 other younger brothers and about 10 first cousins on her mother's side.    REVIEW OF SYSTEMS  Review of Systems   Constitutional:  Negative for appetite change, chills, fatigue, fever and unexpected weight change.   HENT:   Negative for lump/mass and mouth sores.    Respiratory:  Negative for chest tightness, cough and shortness of breath.     Cardiovascular:  Negative for chest pain, leg swelling and palpitations.   Gastrointestinal:  Negative for abdominal distention, abdominal pain, blood in stool, constipation, diarrhea, nausea and vomiting.   Genitourinary:  Negative for dysuria, frequency, vaginal bleeding and vaginal discharge.    Musculoskeletal:  Negative for arthralgias and myalgias.   Skin:  Negative for rash.   Neurological:  Negative for dizziness, light-headedness and numbness.   Hematological:  Negative for adenopathy.   Psychiatric/Behavioral:  Negative for decreased concentration, depression and sleep disturbance. The patient is not nervous/anxious.      OBJECTIVE   Vitals:    03/11/24 1009   BP: 114/78   Pulse: 66      Body mass index is 28.12 kg/m².     Physical Exam:   Constitutional: She is oriented to person, place, and time. She appears well-developed and well-nourished. No distress.    HENT:   Head: Normocephalic and atraumatic.    Eyes: Conjunctivae and EOM are normal.      Pulmonary/Chest: Effort normal. No respiratory distress.        Abdominal: Soft. She exhibits no distension and no mass. There is no abdominal tenderness. There is no rebound and no guarding. No hernia.     Genitourinary:    Genitourinary Comments: Vulva - normal  Vagina - no lesions, vaginal cuff well healed  Cervix - surgically absent  Uterus - surgically absent  Adnexa - no masses                  Neurological: She is alert and oriented to person, place, and time.    Skin: No rash noted.    Psychiatric: She has a normal mood and affect. Her behavior is normal.     ECOG status: 0    LABORATORY DATA  Lab data reviewed.    RADIOLOGICAL DATA  Radiology data reviewed.    PATHOLOGY DATA  Pathology data reviewed.    ASSESSMENT    1. Fallopian tube cancer, carcinoma, right    2. 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  She remains HIGINIO.  She will continue with routine surveillance and will contact us promptly if she has any  problems or questions in the interim.       Per SGO 2017 Guidelines, f/u is every 3 months for the first 2 years, and then every 6 months for years 3 to 5.   is optional during this time with  and imaging done for suspected recurrence.      PLAN  Orders Placed This Encounter   Procedures    CANCER ANTIGEN 125   Follow-up in 6 months            Frederick Stockton MD

## 2024-03-11 NOTE — PROGRESS NOTES
Please let Ms. Coto know her  level was normal.  Thanks!  I look forward to seeing her back in 6 months.

## 2024-03-11 NOTE — TELEPHONE ENCOUNTER
----- Message from Fredeirck Stockton MD sent at 3/11/2024  3:13 PM CDT -----  Please let Ms. Coto know her  level was normal.  Thanks!  I look forward to seeing her back in 6 months.

## 2024-03-14 ENCOUNTER — CLINICAL SUPPORT (OUTPATIENT)
Dept: OTHER | Facility: CLINIC | Age: 69
End: 2024-03-14
Payer: COMMERCIAL

## 2024-03-14 DIAGNOSIS — Z00.8 ENCOUNTER FOR OTHER GENERAL EXAMINATION: ICD-10-CM

## 2024-03-15 VITALS
HEIGHT: 64 IN | SYSTOLIC BLOOD PRESSURE: 105 MMHG | WEIGHT: 174 LBS | DIASTOLIC BLOOD PRESSURE: 75 MMHG | BODY MASS INDEX: 29.71 KG/M2

## 2024-03-15 LAB
GLUCOSE SERPL-MCNC: 83 MG/DL (ref 60–140)
HDLC SERPL-MCNC: 64 MG/DL
POC CHOLESTEROL, LDL (DOCK): 124 MG/DL
POC CHOLESTEROL, TOTAL: 202 MG/DL
TRIGL SERPL-MCNC: 80 MG/DL

## 2024-06-17 ENCOUNTER — TELEPHONE (OUTPATIENT)
Dept: PRIMARY CARE CLINIC | Facility: CLINIC | Age: 69
End: 2024-06-17
Payer: COMMERCIAL

## 2024-06-17 ENCOUNTER — TELEPHONE (OUTPATIENT)
Dept: HEMATOLOGY/ONCOLOGY | Facility: CLINIC | Age: 69
End: 2024-06-17
Payer: COMMERCIAL

## 2024-06-17 DIAGNOSIS — Z12.31 SCREENING MAMMOGRAM, ENCOUNTER FOR: Primary | ICD-10-CM

## 2024-06-17 NOTE — TELEPHONE ENCOUNTER
-Called patient and spoke to patient to set up follow up visit. ---- Message from Arron Swartz DNP sent at 6/15/2024  5:36 PM CDT -----  Pt needs 60-min post-test visit with me.  Please schedule.  OK to use 2 back-to-back 30-min slots.  Thanks!

## 2024-06-17 NOTE — TELEPHONE ENCOUNTER
----- Message from Jam Joe sent at 6/17/2024  9:58 AM CDT -----  Regarding: Mammo  Contact: Liz [phone]  [email or web address]     Patient is calling for Medical Advice regarding: Pt called and needed to schedule a mammogram. Mammo is needed for August.    How long has patient had these symptoms:    Pharmacy name and phone#:    Patient wants a call back or thru [facility]: call    Comments:    Please advise patient replies from provider may take up to 48 hours.

## 2024-06-25 DIAGNOSIS — Z00.00 ENCOUNTER FOR MEDICARE ANNUAL WELLNESS EXAM: ICD-10-CM

## 2024-06-28 ENCOUNTER — TELEPHONE (OUTPATIENT)
Dept: HEMATOLOGY/ONCOLOGY | Facility: CLINIC | Age: 69
End: 2024-06-28
Payer: MEDICARE

## 2024-07-25 ENCOUNTER — OFFICE VISIT (OUTPATIENT)
Dept: PRIMARY CARE CLINIC | Facility: CLINIC | Age: 69
End: 2024-07-25
Payer: MEDICARE

## 2024-07-25 VITALS
HEIGHT: 64 IN | HEART RATE: 68 BPM | DIASTOLIC BLOOD PRESSURE: 60 MMHG | BODY MASS INDEX: 29.47 KG/M2 | OXYGEN SATURATION: 97 % | WEIGHT: 172.63 LBS | SYSTOLIC BLOOD PRESSURE: 100 MMHG

## 2024-07-25 DIAGNOSIS — M85.80 OSTEOPENIA, UNSPECIFIED LOCATION: ICD-10-CM

## 2024-07-25 DIAGNOSIS — H91.93 BILATERAL HEARING LOSS, UNSPECIFIED HEARING LOSS TYPE: ICD-10-CM

## 2024-07-25 DIAGNOSIS — Z00.00 ENCOUNTER FOR PREVENTIVE HEALTH EXAMINATION: Primary | ICD-10-CM

## 2024-07-25 DIAGNOSIS — Z85.44 HISTORY OF CANCER OF FALLOPIAN TUBE IN ADULTHOOD: ICD-10-CM

## 2024-07-25 DIAGNOSIS — N18.31 CHRONIC KIDNEY DISEASE, STAGE 3A: ICD-10-CM

## 2024-07-25 DIAGNOSIS — D36.7 CYST, DERMOID, LEG, RIGHT: ICD-10-CM

## 2024-07-25 PROCEDURE — 99999 PR PBB SHADOW E&M-EST. PATIENT-LVL V: CPT | Mod: PBBFAC,,,

## 2024-07-25 NOTE — PATIENT INSTRUCTIONS
Counseling and Referral of Other Preventative  (Italic type indicates deductible and co-insurance are waived)    Patient Name: Liz Coto  Today's Date: 7/25/2024    Health Maintenance       Date Due Completion Date    Annual UACr Never done ---    TETANUS VACCINE Never done ---    RSV Vaccine (Age 60+ and Pregnant patients) (1 - 1-dose 60+ series) Never done ---    COVID-19 Vaccine (5 - 2023-24 season) 06/05/2024 2/5/2024    Mammogram 08/17/2024 8/17/2023    Influenza Vaccine (1) 09/01/2024 10/26/2023    DEXA Scan 02/10/2025 2/10/2023    Colorectal Cancer Screening 09/29/2025 9/29/2020    Pneumococcal Vaccines (Age 65+) (3 of 3 - PPSV23 or PCV20) 11/08/2026 11/8/2021    Hemoglobin A1c (Diabetic Prevention Screening) 02/05/2027 2/5/2024    Lipid Panel 03/14/2029 3/14/2024        Orders Placed This Encounter   Procedures    Ambulatory referral/consult to ENT    Ambulatory referral/consult to Audiology     The following information is provided to all patients.  This information is to help you find resources for any of the problems found today that may be affecting your health:                  Living healthy guide: www.[facility].[place].gov      Understanding Diabetes: www.diabetes.org      Eating healthy: www.cdc.gov/healthyweight      CDC home safety checklist: www.cdc.gov/steadi/patient.html      Agency on Aging: www.goea.[place].[facility]      Alcoholics anonymous (AA): www.aa.org      Physical Activity: www.daniel.nih.gov/sv5wiow      Tobacco use: www.quitwithusla.org

## 2024-07-25 NOTE — PROGRESS NOTES
"  Liz Coto presented for an initial Medicare AWV today.  She is a patient of Dr. Alegre and is new to me.  The following components were reviewed and updated:    Medical history  Family History  Social history  Allergies and Current Medications  Health Risk Assessment  Health Maintenance  Care Team    **See Completed Assessments for Annual Wellness visit with in the encounter summary    The following assessments were completed:  Depression Screening  Cognitive function Screening  Timed Get Up Test  Whisper Test      Opioid documentation:      Patient does not have a current opioid prescription.            Vitals:    07/25/24 1101   BP: 100/60   BP Location: Right arm   Patient Position: Sitting   Pulse: 68   SpO2: 97%   Weight: 78.3 kg (172 lb 9.9 oz)   Height: 5' 3.5" (1.613 m)     Body mass index is 30.1 kg/m².       Physical Exam  Vitals reviewed.   Constitutional:       Appearance: Normal appearance.   HENT:      Head: Normocephalic and atraumatic.   Cardiovascular:      Rate and Rhythm: Normal rate and regular rhythm.      Pulses: Normal pulses.           Radial pulses are 2+ on the right side and 2+ on the left side.        Dorsalis pedis pulses are 2+ on the right side and 2+ on the left side.        Posterior tibial pulses are 2+ on the right side and 2+ on the left side.      Heart sounds: Normal heart sounds.   Pulmonary:      Effort: Pulmonary effort is normal.      Breath sounds: Normal breath sounds.   Musculoskeletal:      Right lower leg: No edema.      Left lower leg: No edema.   Skin:     General: Skin is warm and dry.      Capillary Refill: Capillary refill takes less than 2 seconds.   Neurological:      General: No focal deficit present.      Mental Status: She is alert and oriented to person, place, and time.   Psychiatric:         Mood and Affect: Mood normal.         Behavior: Behavior normal.       Diagnoses and health risks identified today and associated recommendations/orders:  1. " Encounter for preventive health examination  Assessment and evaluation performed as stated above    2. Chronic kidney disease, stage 3a  Stable. I recommend avoiding nephrotoxic medications such as NSAIDS (aleve, motrin, naproxen, ibuprofen).  These medications put extra stress on the kidneys.  Increase hydration to keep kidneys operating at an optimal level.  Followed by pcp    3. History of cancer of fallopian tube in adulthood  Stable, followed by hem/onc.    4. Osteopenia, unspecified location  Stable.  WEIGHT BEARING EXERCISE (carrying light weights) is the BEST way to strengthen the bone where your muscles insert & prevent future fractures.      Focus on about 1200 mg of CALCIUM daily and 800 units of VITAMIN D daily can help. Follow up with pcp    5. Bilateral hearing loss, unspecified hearing loss type  Deteriorating.  Pt did not pass whisper test.  Referral initiated  - Ambulatory referral/consult to ENT; Future  - Ambulatory referral/consult to Audiology; Future    6. Cyst, dermoid, leg, right  Pt requested derm referral to evaluate cyst on right lower leg.  Referral initiated.  - Ambulatory referral/consult to Dermatology; Future      Provided Ilz with a 5-10 year written screening schedule and personal prevention plan. Recommendations were developed using the USPSTF age appropriate recommendations. Education, counseling, and referrals were provided as needed.  After Visit Summary printed and given to patient which includes a list of additional screenings\tests needed.    Follow up in about 1 year (around 7/25/2025), or if symptoms worsen or fail to improve.      Sarah Rajput, JADE    I offered to discuss advanced care planning, including how to pick a person who would make decisions for you if you were unable to make them for yourself, called a health care power of , and what kind of decisions you might make such as use of life sustaining treatments such as ventilators and tube feeding when  faced with a life limiting illness recorded on a living will that they will need to know. (How you want to be cared for as you near the end of your natural life)     X Patient is interested in learning more about how to make advanced directives.  I provided them paperwork and offered to discuss this with them.

## 2024-08-19 ENCOUNTER — HOSPITAL ENCOUNTER (OUTPATIENT)
Dept: RADIOLOGY | Facility: HOSPITAL | Age: 69
Discharge: HOME | End: 2024-08-19
Attending: INTERNAL MEDICINE
Payer: MEDICARE

## 2024-08-19 DIAGNOSIS — Z12.31 SCREENING MAMMOGRAM, ENCOUNTER FOR: ICD-10-CM

## 2024-08-19 PROCEDURE — 77063 BREAST TOMOSYNTHESIS BI: CPT | Mod: 26,,, | Performed by: RADIOLOGY

## 2024-08-19 PROCEDURE — 77067 SCR MAMMO BI INCL CAD: CPT | Mod: TC

## 2024-08-19 PROCEDURE — 77067 SCR MAMMO BI INCL CAD: CPT | Mod: 26,,, | Performed by: RADIOLOGY

## 2024-09-12 ENCOUNTER — TELEPHONE (OUTPATIENT)
Dept: GYNECOLOGIC ONCOLOGY | Facility: CLINIC | Age: 69
End: 2024-09-12
Payer: MEDICARE

## 2024-09-13 NOTE — TELEPHONE ENCOUNTER
Left vm for pt regarding change to 09/16/24 appt, and change from Dr. Peterson to Dr. Stockton. Pt appt changed to 09/23/24 1:15pm with Dr. Stockton at [facility] gyn-onc location. Also included in vm for pt to contact gyn-onc clinic regarding the rescheduled appts.

## 2024-09-23 ENCOUNTER — OFFICE VISIT (OUTPATIENT)
Dept: GYNECOLOGIC ONCOLOGY | Facility: CLINIC | Age: 69
End: 2024-09-23
Payer: MEDICARE

## 2024-09-23 ENCOUNTER — LAB VISIT (OUTPATIENT)
Dept: LAB | Facility: HOSPITAL | Age: 69
End: 2024-09-23
Attending: OBSTETRICS & GYNECOLOGY
Payer: MEDICARE

## 2024-09-23 VITALS
WEIGHT: 170 LBS | TEMPERATURE: 99 F | BODY MASS INDEX: 29.02 KG/M2 | SYSTOLIC BLOOD PRESSURE: 104 MMHG | DIASTOLIC BLOOD PRESSURE: 70 MMHG | HEART RATE: 72 BPM | OXYGEN SATURATION: 98 % | HEIGHT: 64 IN

## 2024-09-23 DIAGNOSIS — C57.01 FALLOPIAN TUBE CANCER, CARCINOMA, RIGHT: ICD-10-CM

## 2024-09-23 DIAGNOSIS — C57.01 FALLOPIAN TUBE CANCER, CARCINOMA, RIGHT: Primary | ICD-10-CM

## 2024-09-23 DIAGNOSIS — Z15.03 MUTATION IN HOXB13 GENE: ICD-10-CM

## 2024-09-23 LAB — CANCER AG125 SERPL-ACNC: 9 U/ML (ref 0–30)

## 2024-09-23 PROCEDURE — 36415 COLL VENOUS BLD VENIPUNCTURE: CPT | Performed by: OBSTETRICS & GYNECOLOGY

## 2024-09-23 PROCEDURE — 1126F AMNT PAIN NOTED NONE PRSNT: CPT | Mod: CPTII,S$GLB,, | Performed by: OBSTETRICS & GYNECOLOGY

## 2024-09-23 PROCEDURE — 3044F HG A1C LEVEL LT 7.0%: CPT | Mod: CPTII,S$GLB,, | Performed by: OBSTETRICS & GYNECOLOGY

## 2024-09-23 PROCEDURE — 3288F FALL RISK ASSESSMENT DOCD: CPT | Mod: CPTII,S$GLB,, | Performed by: OBSTETRICS & GYNECOLOGY

## 2024-09-23 PROCEDURE — 3074F SYST BP LT 130 MM HG: CPT | Mod: CPTII,S$GLB,, | Performed by: OBSTETRICS & GYNECOLOGY

## 2024-09-23 PROCEDURE — 3008F BODY MASS INDEX DOCD: CPT | Mod: CPTII,S$GLB,, | Performed by: OBSTETRICS & GYNECOLOGY

## 2024-09-23 PROCEDURE — 1157F ADVNC CARE PLAN IN RCRD: CPT | Mod: CPTII,S$GLB,, | Performed by: OBSTETRICS & GYNECOLOGY

## 2024-09-23 PROCEDURE — 99213 OFFICE O/P EST LOW 20 MIN: CPT | Mod: S$GLB,,, | Performed by: OBSTETRICS & GYNECOLOGY

## 2024-09-23 PROCEDURE — 3078F DIAST BP <80 MM HG: CPT | Mod: CPTII,S$GLB,, | Performed by: OBSTETRICS & GYNECOLOGY

## 2024-09-23 PROCEDURE — 99999 PR PBB SHADOW E&M-EST. PATIENT-LVL III: CPT | Mod: PBBFAC,,, | Performed by: OBSTETRICS & GYNECOLOGY

## 2024-09-23 PROCEDURE — 86304 IMMUNOASSAY TUMOR CA 125: CPT | Performed by: OBSTETRICS & GYNECOLOGY

## 2024-09-23 PROCEDURE — 1101F PT FALLS ASSESS-DOCD LE1/YR: CPT | Mod: CPTII,S$GLB,, | Performed by: OBSTETRICS & GYNECOLOGY

## 2024-09-23 PROCEDURE — G2211 COMPLEX E/M VISIT ADD ON: HCPCS | Mod: S$GLB,,, | Performed by: OBSTETRICS & GYNECOLOGY

## 2024-09-23 NOTE — PROGRESS NOTES
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   Liz Coto is a 68 y.o. 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 Shukla.  I assumed her surveillance care with Dr. Shukla's alf.  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      She has no vaginal bleeding or discharge.  She is having no nausea or vomiting.  She has no bowel or bladder complaints, though her bowel movements are not quite as regular as they used to be.  She has no pain issues.  She feels good and has no other concerns.     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     HPI or ONCOLOGIC HISTORY  10/8/2019  1097     2019 HOLLY/BSO/small bowel resection/resection of PA sherry mass and omentectomy.   PATHOLOGY:  FIGO stage [id number] grade 3 right fallopian tube adenocarcinoma with retroperitoneal nodes. 2 omental nodes also found. No gross omental involvement. No residual disease.      2019 - 4/15/2020 Carbo Taxotere x 6 (First cycle single agent carbo due to taxol reaction)   : 1097>20(before C1)>13(p C3)>15(C6)>14.       Myriad myRisk:   Clinically Significant:  HOXB13 c.853del (p.*285Lysext*97)  VUS:  MSH3 c.1567G>A (p.Uqu411Qil)  MSH3 c.2511G>A (p.Vwi878Vai)    2023  = 11     Of Note:  Patient's maternal grandfather  of prostate cancer and her brother is undergoing chemo for metastatic prostate cancer.  She has 2 other younger brothers and about 10 first cousins on her mother's side.    REVIEW OF SYSTEMS  Review of Systems   Constitutional:  Negative for appetite change, chills, fatigue, fever and unexpected weight change.   HENT:   Negative for lump/mass and mouth sores.    Respiratory:  Negative for chest tightness, cough and shortness of breath.     Cardiovascular:  Negative for chest pain, leg swelling and palpitations.   Gastrointestinal:  Negative for abdominal distention, abdominal pain, blood in stool, constipation, diarrhea, nausea and vomiting.   Genitourinary:  Negative for dysuria, frequency, vaginal bleeding and vaginal discharge.    Musculoskeletal:  Negative for arthralgias and myalgias.   Skin:  Negative for rash.   Neurological:  Negative for dizziness, light-headedness and numbness.   Hematological:  Negative for adenopathy.   Psychiatric/Behavioral:  Negative for decreased concentration, depression and sleep disturbance. The patient is not nervous/anxious.      OBJECTIVE   Vitals:    09/23/24 1053   BP: 104/70   Pulse: 72   Temp: 98.6 °F (37 °C)      Body mass index is 29.18 kg/m².     Physical Exam:   Constitutional: She is oriented to person, place, and time. She appears well-developed and well-nourished. No distress.    HENT:   Head: Normocephalic and atraumatic.    Eyes: Conjunctivae and EOM are normal.      Pulmonary/Chest: Effort normal. No respiratory distress.        Abdominal: Soft. She exhibits no distension and no mass. There is no abdominal tenderness. There is no rebound and no guarding. No hernia.     Genitourinary:    Genitourinary Comments: Vulva - normal  Vagina - no lesions, vaginal cuff well healed  Cervix - surgically absent  Uterus - surgically absent  Adnexa - no masses                  Neurological: She is alert and oriented to person, place, and time.    Skin: No rash noted.    Psychiatric: She has a normal mood and affect. Her behavior is normal.     ECOG status: 0    LABORATORY DATA  Lab data reviewed.    RADIOLOGICAL DATA  Radiology data reviewed.    PATHOLOGY DATA  Pathology data reviewed.    ASSESSMENT    1. Fallopian tube cancer, carcinoma, right    2. 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  She remains HIGINIO.  She will continue with routine surveillance and will contact us  promptly if she has any problems or questions in the interim.       Per SGO 2017 Guidelines, f/u is every 3 months for the first 2 years, and then every 6 months for years 3 to 5 (4/2025).   is optional during this time with  and imaging done for suspected recurrence.      PLAN  Follow-up in 6 months and then refer to survivorship            Frederick Stockton MD    ONGOING COMPLEXITY BILLING  Visit today is associated with current or anticipated ongoing medical care related to this patients single serious condition/complex condition: fallopian tube cancer

## 2024-10-28 ENCOUNTER — OFFICE VISIT (OUTPATIENT)
Dept: OPTOMETRY | Facility: CLINIC | Age: 69
End: 2024-10-28
Payer: MEDICARE

## 2024-10-28 DIAGNOSIS — H25.13 NUCLEAR SCLEROSIS OF BOTH EYES: Primary | ICD-10-CM

## 2024-10-28 DIAGNOSIS — H52.7 REFRACTIVE ERROR: ICD-10-CM

## 2024-10-28 DIAGNOSIS — H50.332 INTERMITTENT EXOTROPIA OF LEFT EYE: ICD-10-CM

## 2024-10-28 PROCEDURE — 99999 PR PBB SHADOW E&M-EST. PATIENT-LVL II: CPT | Mod: PBBFAC,,, | Performed by: OPTOMETRIST

## 2024-10-28 PROCEDURE — 92015 DETERMINE REFRACTIVE STATE: CPT | Mod: S$GLB,,, | Performed by: OPTOMETRIST

## 2024-10-28 PROCEDURE — 99214 OFFICE O/P EST MOD 30 MIN: CPT | Mod: S$GLB,,, | Performed by: OPTOMETRIST

## 2024-10-28 PROCEDURE — 1157F ADVNC CARE PLAN IN RCRD: CPT | Mod: CPTII,S$GLB,, | Performed by: OPTOMETRIST

## 2024-10-28 PROCEDURE — 3288F FALL RISK ASSESSMENT DOCD: CPT | Mod: CPTII,S$GLB,, | Performed by: OPTOMETRIST

## 2024-10-28 PROCEDURE — 1126F AMNT PAIN NOTED NONE PRSNT: CPT | Mod: CPTII,S$GLB,, | Performed by: OPTOMETRIST

## 2024-10-28 PROCEDURE — 1101F PT FALLS ASSESS-DOCD LE1/YR: CPT | Mod: CPTII,S$GLB,, | Performed by: OPTOMETRIST

## 2024-10-28 PROCEDURE — 1160F RVW MEDS BY RX/DR IN RCRD: CPT | Mod: CPTII,S$GLB,, | Performed by: OPTOMETRIST

## 2024-10-28 PROCEDURE — 3044F HG A1C LEVEL LT 7.0%: CPT | Mod: CPTII,S$GLB,, | Performed by: OPTOMETRIST

## 2024-10-28 PROCEDURE — 1159F MED LIST DOCD IN RCRD: CPT | Mod: CPTII,S$GLB,, | Performed by: OPTOMETRIST

## 2024-11-05 ENCOUNTER — OFFICE VISIT (OUTPATIENT)
Dept: PRIMARY CARE CLINIC | Facility: CLINIC | Age: 69
End: 2024-11-05
Payer: MEDICARE

## 2024-11-05 ENCOUNTER — LAB VISIT (OUTPATIENT)
Dept: LAB | Facility: HOSPITAL | Age: 69
End: 2024-11-05
Attending: INTERNAL MEDICINE
Payer: MEDICARE

## 2024-11-05 VITALS
OXYGEN SATURATION: 97 % | DIASTOLIC BLOOD PRESSURE: 80 MMHG | HEIGHT: 64 IN | HEART RATE: 65 BPM | WEIGHT: 172.81 LBS | SYSTOLIC BLOOD PRESSURE: 110 MMHG | TEMPERATURE: 97 F | RESPIRATION RATE: 20 BRPM | BODY MASS INDEX: 29.5 KG/M2

## 2024-11-05 DIAGNOSIS — F43.20 ANTICIPATORY GRIEVING: ICD-10-CM

## 2024-11-05 DIAGNOSIS — N18.31 CHRONIC KIDNEY DISEASE, STAGE 3A: Primary | ICD-10-CM

## 2024-11-05 DIAGNOSIS — N18.31 CHRONIC KIDNEY DISEASE, STAGE 3A: ICD-10-CM

## 2024-11-05 LAB
ALBUMIN/CREAT UR: NORMAL UG/MG (ref 0–30)
ANION GAP SERPL CALC-SCNC: 10 MMOL/L (ref 8–16)
BUN SERPL-MCNC: 14 MG/DL (ref 8–23)
CALCIUM SERPL-MCNC: 9.3 MG/DL (ref 8.7–10.5)
CHLORIDE SERPL-SCNC: 104 MMOL/L (ref 95–110)
CO2 SERPL-SCNC: 24 MMOL/L (ref 23–29)
CREAT SERPL-MCNC: 1.1 MG/DL (ref 0.5–1.4)
CREAT UR-MCNC: 52 MG/DL (ref 15–325)
EST. GFR  (NO RACE VARIABLE): 55.1 ML/MIN/1.73 M^2
GLUCOSE SERPL-MCNC: 76 MG/DL (ref 70–110)
MICROALBUMIN UR DL<=1MG/L-MCNC: <5 UG/ML
POTASSIUM SERPL-SCNC: 3.9 MMOL/L (ref 3.5–5.1)
SODIUM SERPL-SCNC: 138 MMOL/L (ref 136–145)

## 2024-11-05 PROCEDURE — 3074F SYST BP LT 130 MM HG: CPT | Mod: CPTII,S$GLB,, | Performed by: INTERNAL MEDICINE

## 2024-11-05 PROCEDURE — 1157F ADVNC CARE PLAN IN RCRD: CPT | Mod: CPTII,S$GLB,, | Performed by: INTERNAL MEDICINE

## 2024-11-05 PROCEDURE — 3288F FALL RISK ASSESSMENT DOCD: CPT | Mod: CPTII,S$GLB,, | Performed by: INTERNAL MEDICINE

## 2024-11-05 PROCEDURE — 82570 ASSAY OF URINE CREATININE: CPT | Performed by: INTERNAL MEDICINE

## 2024-11-05 PROCEDURE — 99999 PR PBB SHADOW E&M-EST. PATIENT-LVL III: CPT | Mod: PBBFAC,,, | Performed by: INTERNAL MEDICINE

## 2024-11-05 PROCEDURE — 1159F MED LIST DOCD IN RCRD: CPT | Mod: CPTII,S$GLB,, | Performed by: INTERNAL MEDICINE

## 2024-11-05 PROCEDURE — 80048 BASIC METABOLIC PNL TOTAL CA: CPT | Performed by: INTERNAL MEDICINE

## 2024-11-05 PROCEDURE — 99213 OFFICE O/P EST LOW 20 MIN: CPT | Mod: S$GLB,,, | Performed by: INTERNAL MEDICINE

## 2024-11-05 PROCEDURE — 1101F PT FALLS ASSESS-DOCD LE1/YR: CPT | Mod: CPTII,S$GLB,, | Performed by: INTERNAL MEDICINE

## 2024-11-05 PROCEDURE — 1160F RVW MEDS BY RX/DR IN RCRD: CPT | Mod: CPTII,S$GLB,, | Performed by: INTERNAL MEDICINE

## 2024-11-05 PROCEDURE — 1126F AMNT PAIN NOTED NONE PRSNT: CPT | Mod: CPTII,S$GLB,, | Performed by: INTERNAL MEDICINE

## 2024-11-05 PROCEDURE — 3044F HG A1C LEVEL LT 7.0%: CPT | Mod: CPTII,S$GLB,, | Performed by: INTERNAL MEDICINE

## 2024-11-05 PROCEDURE — 3079F DIAST BP 80-89 MM HG: CPT | Mod: CPTII,S$GLB,, | Performed by: INTERNAL MEDICINE

## 2024-11-05 PROCEDURE — 36415 COLL VENOUS BLD VENIPUNCTURE: CPT | Mod: PN | Performed by: INTERNAL MEDICINE

## 2024-11-05 PROCEDURE — 3008F BODY MASS INDEX DOCD: CPT | Mod: CPTII,S$GLB,, | Performed by: INTERNAL MEDICINE
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
## 2024-12-13 ENCOUNTER — OFFICE VISIT (OUTPATIENT)
Dept: OTOLARYNGOLOGY | Facility: CLINIC | Age: 69
End: 2024-12-13
Payer: MEDICARE

## 2024-12-13 ENCOUNTER — CLINICAL SUPPORT (OUTPATIENT)
Dept: AUDIOLOGY | Facility: CLINIC | Age: 69
End: 2024-12-13
Payer: MEDICARE

## 2024-12-13 DIAGNOSIS — H90.3 SENSORINEURAL HEARING LOSS (SNHL), BILATERAL: Primary | ICD-10-CM

## 2024-12-13 DIAGNOSIS — H90.3 ASYMMETRICAL SENSORINEURAL HEARING LOSS: Primary | ICD-10-CM

## 2024-12-13 PROCEDURE — 92567 TYMPANOMETRY: CPT | Mod: S$GLB,,,

## 2024-12-13 PROCEDURE — 92557 COMPREHENSIVE HEARING TEST: CPT | Mod: S$GLB,,,

## 2024-12-13 NOTE — PROGRESS NOTES
OTOLARYNGOLOGY CLINIC NOTE  Date:  12/13/2024     Chief complaint:  Chief Complaint   Patient presents with    Hearing Loss     History of Present Illness  Liz Coto is a 69 y.o. female  presenting today for a new evaluation and treatment of hearing loss.  Pt reports having a decline in her hearing over several years.  Pt denies any otalgia, otorrhea, tinnitus or vertigo.  Pt denies any noise exposure that she can remember over her lifetime.      Past Medical History  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    Ovarian cancer       Past Surgical History  Past Surgical History:   Procedure Laterality Date    BILATERAL SALPINGO-OOPHORECTOMY (BSO) N/A 11/4/2019    Procedure: SALPINGO-OOPHORECTOMY, BILATERAL;  Surgeon: Pérez Shukla MD;  Location: [facility] OR [address] [place] [address];  Service: OB/GYN;  Laterality: N/A;    COLONOSCOPY N/A 9/29/2020    Procedure: COLONOSCOPY;  Surgeon: ANAHY Fong MD;  Location: [facility] ENDO (4TH FLR);  Service: Endoscopy;  Laterality: N/A;  9/26-covid [facility]-tb  9/28-confirmed appt-tb    DEBULKING OF TUMOR N/A 11/4/2019    Procedure: DEBULKING, NEOPLASM;  Surgeon: Pérez Shukla MD;  Location: [facility] OR [address] [place] [address];  Service: OB/GYN;  Laterality: N/A;    ECTOPIC PREGNANCY SURGERY      1981. thinks it was the left     LYSIS OF ADHESIONS N/A 11/4/2019    Procedure: LYSIS, ADHESIONS;  Surgeon: Pérez Shukla MD;  Location: [facility] OR [address] [place] [address];  Service: OB/GYN;  Laterality: N/A;  Sigmoid    OMENTECTOMY N/A 11/4/2019    Procedure: OMENTECTOMY;  Surgeon: Pérez Shukla MD;  Location: [facility] OR [address] [place] [address];  Service: OB/GYN;  Laterality: N/A;    PARA-AORTIC LYMPHADENECTOMY N/A 11/4/2019    Procedure: LYMPHADENECTOMY, PARA-AORTIC;   "Surgeon: Clarence Colmenares MD;  Location: [address];  Service: General;  Laterality: N/A;    TONSILLECTOMY      TOTAL ABDOMINAL HYSTERECTOMY N/A 11/4/2019    Procedure: HYSTERECTOMY, TOTAL, ABDOMINAL;  Surgeon: Pérez Shukla MD;  Location: [facility] OR [address] [place] [address];  Service: OB/GYN;  Laterality: N/A;      Medications  Current Outpatient Medications on File Prior to Visit   Medication Sig Dispense Refill    ergocalciferol (ERGOCALCIFEROL) 50,000 unit Cap Take 1 capsule (50,000 Units total) by mouth every 7 days. 12 capsule 3     No current facility-administered medications on file prior to visit.     Review of Systems  Review of Systems   Constitutional: Negative.    HENT:  Positive for hearing loss.    Eyes: Negative.    Respiratory: Negative.     Cardiovascular: Negative.    Gastrointestinal: Negative.    Skin: Negative.       Social History   reports that she quit smoking about 5 years ago. Her smoking use included cigarettes. She started smoking about 25 years ago. She has a 5.8 pack-year smoking history. She has never been exposed to tobacco smoke. She has never used smokeless tobacco. She reports that she does not currently use alcohol. She reports that she does not use drugs.     Family History  Family History   Problem Relation Name Age of Onset    Thyroid disease Mother Nay     Breast cancer Mother Nay 40        unilat    Diabetes Father      Lupus Father      No Known Problems Sister Kathryn     No Known Problems Sister Fay     Prostate cancer Brother Lawrence         dx 64-65    Colon cancer Brother Lawrence         dx 64-65y    Dementia Maternal Aunt Marisol     Cancer Maternal Aunt Rose         "blood" (was COD)    Lung cancer Maternal Aunt Maureen     Prostate cancer Maternal Uncle Forrest 69    No Known Problems Maternal Uncle Hayden     No Known Problems Paternal Aunt      No Known Problems Paternal Uncle      Breast cancer Maternal Grandmother          age at dx? unilat    Colon cancer Maternal " Grandfather          dx 90y    No Known Problems Paternal Grandmother      No Known Problems Paternal Grandfather      Breast cancer Maternal Cousin Willa 61        unilat (now s/p BRRM)    No Known Problems Other [place]     No Known Problems Other Josefa     No Known Problems Other Catarino     No Known Problems Other Jack     No Known Problems Other Javi     No Known Problems Other Ray     No Known Problems Other Mathew     No Known Problems Other Susan     No Known Problems Other Violeta     Cancer Other [place]         type?    Breast cancer Other Sammie     Breast cancer Other Evelina     No Known Problems Other Santos     No Known Problems Other      No Known Problems Other      Cancer Other [place]         pretty confident (type?)    No Known Problems Other Tabatha     No Known Problems Other Consuella     No Known Problems Other [place]     No Known Problems Other Jack     No Known Problems Other Sunny     No Known Problems Other n.     Uterine cancer Neg Hx      Anesthesia problems Neg Hx        Physical Exam   There were no vitals filed for this visit. There is no height or weight on file to calculate BMI.       GENERAL: no acute distress.  HEAD: normocephalic.   EYES: lids and lashes normal. No scleral icterus  EARS: external ear without lesion, normal pinna shape and position.  External auditory canal with normal cerumen, tympanic membrane fully visible, no perforation , no retraction. No middle ear effusion.   NOSE: external nose without significant bony abnormality  ORAL CAVITY/OROPHARYNX: tongue midline and mobile. Symmetric palate rise.    NECK: trachea midline.   LYMPH NODES:No cervical lymphadenopathy.  RESPIRATORY: no stridor, no stertor. Voice normal. Respirations nonlabored.  NEURO: alert, responds to questions appropriately.   Cranial nerve exam as indicated in above sections and additionally showed facial movement symmetric with good eye closure and symmetric smile.    PSYCH:mood appropriate    Imaging:  The patient does not have any pertinent and/or recent imaging of the head and neck.     Labs:  CBC  Recent Labs   Lab 01/05/23  1140 02/05/24  1149   WBC 6.55 6.34   Hemoglobin 14.2 13.5   Hematocrit 42.9 42.2   MCV 93 95   Platelets 316 366     BMP  Recent Labs   Lab 01/05/23  1140 02/05/24  1149 11/05/24  1132   Glucose 78 76 76   Sodium 140 138 138   Potassium 4.0 4.6 3.9   Chloride 103 104 104   CO2 27 27 24   BUN 15 17 14   Creatinine 1.1 1.1 1.1   Calcium 10.0 10.1 9.3         AUDIOLOGY RESULTS  Audiometric evaluation including audiogram, tympanometry, acoustic reflexes, and speech discrimination which was performed  was personally reviewed and interpreted.  Notable findings on the audiogram were mild sloping to profound sensorineural hearing loss (SNHL) bilaterally.  Tympanometry revealed Type A tympanogram on the left and Type A tympanogram on the right. Speech discrimination was 60-80% on the left, and 96% on the right.  Report of the audiologist performing this audiometric testing was also reviewed.    Assessment  1. Sensorineural hearing loss (SNHL), bilateral     Plan:  SNHL:  Audiogram reviewed and discussed with patient. Recheck hearing in 1 year or sooner if subjective change noted. Encouraged hearing protection while in noisy environment.  Additionally, amplification with hearing aids is generally the best option for hearing rehabilitation, except where the hearing loss is profound.  Discussed plan of care with patient in detail and all questions answered. Patient reported understanding of plan of care.

## 2024-12-18 NOTE — PROGRESS NOTES
Please click on link to view Audiogram:  Document on 12/13/2024 8:39 AM by Gabriela Lares AU.D: Audiogram    Ms. Liz Coto, a 69 y.o. female, was seen in the clinic today for an audiological evaluation.     Otoscopy clear bilaterally. Tympanometry testing revealed a Type As tympanogram for the right ear and a Type A tympanogram for the left ear.     Audiological testing revealed a mild sloping to severe sensorineural hearing loss (SNHL) bilaterally. A speech reception threshold was obtained at 40 dBHL for the right ear and at 35 dBHL for the left ear. Speech discrimination was 96% for the right ear and 80% for the left ear.      *Significant asymmetry noted for pure tone thresholds and speech discrimination scores, worse for the left ear.    Recommendations:  1. Otologic evaluation  2. Annual audiological evaluation, sooner if medically necessary or if hearing changes  3. Hearing protection when in noise   4. Utilize [employer] Relief cinthia and other tinnitus management strategies discussed during appointment (lower salt/caffeine intake, monitor stress/anxiety levels, soft background noise in quiet)  5. Hearing aid consultation following medical clearance

## 2025-01-09 ENCOUNTER — TELEPHONE (OUTPATIENT)
Dept: OPHTHALMOLOGY | Facility: CLINIC | Age: 70
End: 2025-01-09
Payer: MEDICARE

## 2025-01-09 DIAGNOSIS — E55.9 VITAMIN D DEFICIENCY: ICD-10-CM

## 2025-01-09 NOTE — TELEPHONE ENCOUNTER
Left VM    ----- Message from Moshe sent at 1/9/2025 10:04 AM CST -----  Regarding: appointment access  Contact: [phone]  Pt is calling to get rescheduled with an appointment . Pt has an appointment scheduled for 01/10/2025 .Please contact pt with appointment information .     Elie Coto  [phone]    Please contact pt with an appointment

## 2025-01-09 NOTE — TELEPHONE ENCOUNTER
Care Due:                  Date            Visit Type   Department     Provider  --------------------------------------------------------------------------------                                MYCHART                              ANNUAL                              CHECKUP/PHY  [facility]   Griselda John  Last Visit: 11-      S            CARE           Sis                              EP -                              PRIMARY      [facility]   Griselda Lopez  Next Visit: 02-      CARE ([facility])   CARE           Sis                                                            Last  Test          Frequency    Reason                     Performed    Due Date  --------------------------------------------------------------------------------    Vitamin D...  12 months..  ergocalciferol...........  02- 01-    [employer] Embedded Care Due Messages. Reference number: [id number].   1/09/2025 7:02:22 AM CST

## 2025-01-12 RX ORDER — ERGOCALCIFEROL 1.25 MG/1
50000 CAPSULE ORAL
Qty: 12 CAPSULE | Refills: 1 | Status: SHIPPED | OUTPATIENT
Start: 2025-01-12

## 2025-02-07 ENCOUNTER — OFFICE VISIT (OUTPATIENT)
Dept: PRIMARY CARE CLINIC | Facility: CLINIC | Age: 70
End: 2025-02-07
Payer: MEDICARE

## 2025-02-07 VITALS
SYSTOLIC BLOOD PRESSURE: 112 MMHG | BODY MASS INDEX: 30.38 KG/M2 | OXYGEN SATURATION: 96 % | DIASTOLIC BLOOD PRESSURE: 78 MMHG | HEART RATE: 74 BPM | HEIGHT: 64 IN | RESPIRATION RATE: 18 BRPM | WEIGHT: 177.94 LBS

## 2025-02-07 DIAGNOSIS — Z85.44 HISTORY OF CANCER OF FALLOPIAN TUBE IN ADULTHOOD: ICD-10-CM

## 2025-02-07 DIAGNOSIS — N18.31 CHRONIC KIDNEY DISEASE, STAGE 3A: Primary | ICD-10-CM

## 2025-02-07 DIAGNOSIS — Z78.0 ASYMPTOMATIC MENOPAUSAL STATE: ICD-10-CM

## 2025-02-07 DIAGNOSIS — Z63.4 BEREAVEMENT WITHOUT COMPLICATION: ICD-10-CM

## 2025-02-07 DIAGNOSIS — E55.9 VITAMIN D DEFICIENCY: ICD-10-CM

## 2025-02-07 DIAGNOSIS — E78.5 HYPERLIPIDEMIA, UNSPECIFIED HYPERLIPIDEMIA TYPE: ICD-10-CM

## 2025-02-07 PROCEDURE — 3008F BODY MASS INDEX DOCD: CPT | Mod: CPTII,S$GLB,, | Performed by: INTERNAL MEDICINE

## 2025-02-07 PROCEDURE — 3078F DIAST BP <80 MM HG: CPT | Mod: CPTII,S$GLB,, | Performed by: INTERNAL MEDICINE

## 2025-02-07 PROCEDURE — 3074F SYST BP LT 130 MM HG: CPT | Mod: CPTII,S$GLB,, | Performed by: INTERNAL MEDICINE

## 2025-02-07 PROCEDURE — 99214 OFFICE O/P EST MOD 30 MIN: CPT | Mod: S$GLB,,, | Performed by: INTERNAL MEDICINE

## 2025-02-07 PROCEDURE — 1157F ADVNC CARE PLAN IN RCRD: CPT | Mod: CPTII,S$GLB,, | Performed by: INTERNAL MEDICINE

## 2025-02-07 PROCEDURE — 99999 PR PBB SHADOW E&M-EST. PATIENT-LVL IV: CPT | Mod: PBBFAC,,, | Performed by: INTERNAL MEDICINE

## 2025-02-07 PROCEDURE — 1160F RVW MEDS BY RX/DR IN RCRD: CPT | Mod: CPTII,S$GLB,, | Performed by: INTERNAL MEDICINE

## 2025-02-07 PROCEDURE — 1159F MED LIST DOCD IN RCRD: CPT | Mod: CPTII,S$GLB,, | Performed by: INTERNAL MEDICINE

## 2025-02-07 PROCEDURE — 1126F AMNT PAIN NOTED NONE PRSNT: CPT | Mod: CPTII,S$GLB,, | Performed by: INTERNAL MEDICINE

## 2025-02-07 SDOH — SOCIAL DETERMINANTS OF HEALTH (SDOH): DISSAPEARANCE AND DEATH OF FAMILY MEMBER: Z63.4
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
## 2025-02-10 ENCOUNTER — LAB VISIT (OUTPATIENT)
Dept: LAB | Facility: HOSPITAL | Age: 70
End: 2025-02-10
Attending: INTERNAL MEDICINE
Payer: MEDICARE

## 2025-02-10 DIAGNOSIS — E78.5 HYPERLIPIDEMIA, UNSPECIFIED HYPERLIPIDEMIA TYPE: ICD-10-CM

## 2025-02-10 DIAGNOSIS — E55.9 VITAMIN D DEFICIENCY: ICD-10-CM

## 2025-02-10 DIAGNOSIS — N18.31 CHRONIC KIDNEY DISEASE, STAGE 3A: ICD-10-CM

## 2025-02-10 LAB
25(OH)D3+25(OH)D2 SERPL-MCNC: 58 NG/ML (ref 30–96)
ALBUMIN SERPL BCP-MCNC: 3.5 G/DL (ref 3.5–5.2)
ALP SERPL-CCNC: 70 U/L (ref 40–150)
ALT SERPL W/O P-5'-P-CCNC: 11 U/L (ref 10–44)
ANION GAP SERPL CALC-SCNC: 6 MMOL/L (ref 8–16)
AST SERPL-CCNC: 19 U/L (ref 10–40)
BILIRUB SERPL-MCNC: 0.4 MG/DL (ref 0.1–1)
BUN SERPL-MCNC: 13 MG/DL (ref 8–23)
CALCIUM SERPL-MCNC: 9.5 MG/DL (ref 8.7–10.5)
CHLORIDE SERPL-SCNC: 104 MMOL/L (ref 95–110)
CHOLEST SERPL-MCNC: 240 MG/DL (ref 120–199)
CHOLEST/HDLC SERPL: 3.8 {RATIO} (ref 2–5)
CO2 SERPL-SCNC: 27 MMOL/L (ref 23–29)
CREAT SERPL-MCNC: 1.2 MG/DL (ref 0.5–1.4)
ERYTHROCYTE [DISTWIDTH] IN BLOOD BY AUTOMATED COUNT: 13.5 % (ref 11.5–14.5)
EST. GFR  (NO RACE VARIABLE): 50 ML/MIN/1.73 M^2
GLUCOSE SERPL-MCNC: 88 MG/DL (ref 70–110)
HCT VFR BLD AUTO: 42.4 % (ref 37–48.5)
HDLC SERPL-MCNC: 64 MG/DL (ref 40–75)
HDLC SERPL: 26.7 % (ref 20–50)
HGB BLD-MCNC: 13.7 G/DL (ref 12–16)
LDLC SERPL CALC-MCNC: 160 MG/DL (ref 63–159)
MCH RBC QN AUTO: 30.4 PG (ref 27–31)
MCHC RBC AUTO-ENTMCNC: 32.3 G/DL (ref 32–36)
MCV RBC AUTO: 94 FL (ref 82–98)
NONHDLC SERPL-MCNC: 176 MG/DL
PLATELET # BLD AUTO: 372 K/UL (ref 150–450)
PMV BLD AUTO: 9.8 FL (ref 9.2–12.9)
POTASSIUM SERPL-SCNC: 4.2 MMOL/L (ref 3.5–5.1)
PROT SERPL-MCNC: 7.5 G/DL (ref 6–8.4)
RBC # BLD AUTO: 4.51 M/UL (ref 4–5.4)
SODIUM SERPL-SCNC: 137 MMOL/L (ref 136–145)
TRIGL SERPL-MCNC: 80 MG/DL (ref 30–150)
WBC # BLD AUTO: 6.74 K/UL (ref 3.9–12.7)

## 2025-02-10 PROCEDURE — 80053 COMPREHEN METABOLIC PANEL: CPT | Performed by: INTERNAL MEDICINE

## 2025-02-10 PROCEDURE — 80061 LIPID PANEL: CPT | Performed by: INTERNAL MEDICINE

## 2025-02-10 PROCEDURE — 85027 COMPLETE CBC AUTOMATED: CPT | Performed by: INTERNAL MEDICINE

## 2025-02-10 PROCEDURE — 82306 VITAMIN D 25 HYDROXY: CPT | Performed by: INTERNAL MEDICINE

## 2025-02-10 PROCEDURE — 36415 COLL VENOUS BLD VENIPUNCTURE: CPT | Performed by: INTERNAL MEDICINE

## 2025-02-14 ENCOUNTER — HOSPITAL ENCOUNTER (OUTPATIENT)
Dept: RADIOLOGY | Facility: HOSPITAL | Age: 70
Discharge: HOME | End: 2025-02-14
Attending: INTERNAL MEDICINE
Payer: MEDICARE

## 2025-02-14 DIAGNOSIS — N18.31 CHRONIC KIDNEY DISEASE, STAGE 3A: ICD-10-CM

## 2025-02-14 PROCEDURE — 76770 US EXAM ABDO BACK WALL COMP: CPT | Mod: 26,,, | Performed by: RADIOLOGY

## 2025-02-14 PROCEDURE — 76770 US EXAM ABDO BACK WALL COMP: CPT | Mod: TC

## 2025-03-03 ENCOUNTER — TELEPHONE (OUTPATIENT)
Dept: OPHTHALMOLOGY | Facility: CLINIC | Age: 70
End: 2025-03-03
Payer: MEDICARE

## 2025-03-03 ENCOUNTER — OFFICE VISIT (OUTPATIENT)
Dept: OPHTHALMOLOGY | Facility: CLINIC | Age: 70
End: 2025-03-03
Payer: MEDICARE

## 2025-03-03 DIAGNOSIS — H52.7 REFRACTIVE ERROR: ICD-10-CM

## 2025-03-03 DIAGNOSIS — H25.12 NS (NUCLEAR SCLEROSIS), LEFT: Primary | ICD-10-CM

## 2025-03-03 DIAGNOSIS — H25.13 NUCLEAR SCLEROSIS OF BOTH EYES: Primary | ICD-10-CM

## 2025-03-03 DIAGNOSIS — H50.332 INTERMITTENT EXOTROPIA OF LEFT EYE: ICD-10-CM

## 2025-03-03 PROCEDURE — 92004 COMPRE OPH EXAM NEW PT 1/>: CPT | Mod: S$GLB,,, | Performed by: OPHTHALMOLOGY

## 2025-03-03 PROCEDURE — 1159F MED LIST DOCD IN RCRD: CPT | Mod: CPTII,S$GLB,, | Performed by: OPHTHALMOLOGY

## 2025-03-03 PROCEDURE — 92136 OPHTHALMIC BIOMETRY: CPT | Mod: LT,S$GLB,, | Performed by: OPHTHALMOLOGY

## 2025-03-03 PROCEDURE — 1101F PT FALLS ASSESS-DOCD LE1/YR: CPT | Mod: CPTII,S$GLB,, | Performed by: OPHTHALMOLOGY

## 2025-03-03 PROCEDURE — 1160F RVW MEDS BY RX/DR IN RCRD: CPT | Mod: CPTII,S$GLB,, | Performed by: OPHTHALMOLOGY

## 2025-03-03 PROCEDURE — 1157F ADVNC CARE PLAN IN RCRD: CPT | Mod: CPTII,S$GLB,, | Performed by: OPHTHALMOLOGY

## 2025-03-03 PROCEDURE — 3288F FALL RISK ASSESSMENT DOCD: CPT | Mod: CPTII,S$GLB,, | Performed by: OPHTHALMOLOGY

## 2025-03-03 PROCEDURE — 99999 PR PBB SHADOW E&M-EST. PATIENT-LVL II: CPT | Mod: PBBFAC,,, | Performed by: OPHTHALMOLOGY

## 2025-03-03 RX ORDER — PREDNISOLONE/MOXIFLOX/BROMFEN 1 %-0.5 %
1 SUSPENSION, DROPS(FINAL DOSAGE FORM)(ML) OPHTHALMIC (EYE) 3 TIMES DAILY
Qty: 8 ML | Refills: 2 | Status: SHIPPED | OUTPATIENT
Start: 2025-04-28

## 2025-03-04 NOTE — PROGRESS NOTES
Subjective:       Patient ID: Liz Coto is a 69 y.o. female.    Chief Complaint: Cataract    HPI    Here for cataract evaluation     Eye meds: None    69 year old states vision is blurry when she watches tv, but doesn't where   her glasses often. Pt did not get her glasses filled from Dr Caputo until she   had cat eval. Denies ocular pain. C?o of glare at night while driving   Last edited by Poppy Larson on 3/3/2025  3:23 PM.             Assessment:       1. Nuclear sclerosis of both eyes    2. Intermittent exotropia of left eye    3. Refractive error        Plan:       Visually significant cataract OU -Pt. Wants Sx.    Intermittent XT OS-Stable.  RE      Cataract Surgery Consent: Patient with a visually significant cataract with difficulties of ADLs, reading, driving, night vision, glare (any and all).  Discussed with Patient/Family/Caregiver: options, risks and benefits, expectations of cataract surgery, utilized an eye model with questions and answers to facilitate discussion.  Discussed lens options and patient understands that glasses may be required for optimal vision for distance and/or near vision after cataract surgery.  The Patient/Family/Caregiver  voice good understanding and patient wishes to proceed with surgery.  The patient will likely benefit from surgery and patient signed consent for Left Eye.  CE OS 5/1/25 CNAOTO 22.0,        OD 5/15/25 CNAOTO 22.0.

## 2025-03-11 ENCOUNTER — TELEPHONE (OUTPATIENT)
Dept: OPHTHALMOLOGY | Facility: CLINIC | Age: 70
End: 2025-03-11
Payer: MEDICARE

## 2025-03-11 DIAGNOSIS — H25.11 NS (NUCLEAR SCLEROSIS), RIGHT: Primary | ICD-10-CM

## 2025-03-16 ENCOUNTER — PATIENT MESSAGE (OUTPATIENT)
Dept: PRIMARY CARE CLINIC | Facility: CLINIC | Age: 70
End: 2025-03-16
Payer: MEDICARE

## 2025-03-16 DIAGNOSIS — N18.31 CHRONIC KIDNEY DISEASE, STAGE 3A: Primary | ICD-10-CM

## 2025-03-24 ENCOUNTER — LAB VISIT (OUTPATIENT)
Dept: LAB | Facility: HOSPITAL | Age: 70
End: 2025-03-24
Attending: OBSTETRICS & GYNECOLOGY
Payer: MEDICARE

## 2025-03-24 ENCOUNTER — OFFICE VISIT (OUTPATIENT)
Dept: GYNECOLOGIC ONCOLOGY | Facility: CLINIC | Age: 70
End: 2025-03-24
Payer: MEDICARE

## 2025-03-24 VITALS
HEART RATE: 68 BPM | DIASTOLIC BLOOD PRESSURE: 77 MMHG | BODY MASS INDEX: 29.29 KG/M2 | WEIGHT: 170.63 LBS | SYSTOLIC BLOOD PRESSURE: 107 MMHG

## 2025-03-24 DIAGNOSIS — C57.01 FALLOPIAN TUBE CANCER, CARCINOMA, RIGHT: ICD-10-CM

## 2025-03-24 DIAGNOSIS — Z85.44 HISTORY OF CANCER OF FALLOPIAN TUBE IN ADULTHOOD: Primary | ICD-10-CM

## 2025-03-24 LAB — CANCER AG125 SERPL-ACNC: 12 UNIT/ML

## 2025-03-24 PROCEDURE — 36415 COLL VENOUS BLD VENIPUNCTURE: CPT

## 2025-03-24 PROCEDURE — 1101F PT FALLS ASSESS-DOCD LE1/YR: CPT | Mod: CPTII,S$GLB,, | Performed by: OBSTETRICS & GYNECOLOGY

## 2025-03-24 PROCEDURE — 3008F BODY MASS INDEX DOCD: CPT | Mod: CPTII,S$GLB,, | Performed by: OBSTETRICS & GYNECOLOGY

## 2025-03-24 PROCEDURE — 3288F FALL RISK ASSESSMENT DOCD: CPT | Mod: CPTII,S$GLB,, | Performed by: OBSTETRICS & GYNECOLOGY

## 2025-03-24 PROCEDURE — 1157F ADVNC CARE PLAN IN RCRD: CPT | Mod: CPTII,S$GLB,, | Performed by: OBSTETRICS & GYNECOLOGY

## 2025-03-24 PROCEDURE — 3078F DIAST BP <80 MM HG: CPT | Mod: CPTII,S$GLB,, | Performed by: OBSTETRICS & GYNECOLOGY

## 2025-03-24 PROCEDURE — 3074F SYST BP LT 130 MM HG: CPT | Mod: CPTII,S$GLB,, | Performed by: OBSTETRICS & GYNECOLOGY

## 2025-03-24 PROCEDURE — 99999 PR PBB SHADOW E&M-EST. PATIENT-LVL III: CPT | Mod: PBBFAC,,, | Performed by: OBSTETRICS & GYNECOLOGY

## 2025-03-24 PROCEDURE — 99212 OFFICE O/P EST SF 10 MIN: CPT | Mod: S$GLB,,, | Performed by: OBSTETRICS & GYNECOLOGY

## 2025-03-24 PROCEDURE — 1159F MED LIST DOCD IN RCRD: CPT | Mod: CPTII,S$GLB,, | Performed by: OBSTETRICS & GYNECOLOGY

## 2025-03-24 PROCEDURE — 86304 IMMUNOASSAY TUMOR CA 125: CPT

## 2025-03-24 PROCEDURE — 1126F AMNT PAIN NOTED NONE PRSNT: CPT | Mod: CPTII,S$GLB,, | Performed by: OBSTETRICS & GYNECOLOGY

## 2025-03-24 NOTE — PROGRESS NOTES
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   Liz Coto is a 68 y.o. 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 Shukla.  I assumed her surveillance care with Dr. Shukla's USP.  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      She has no vaginal bleeding or discharge.  She is having no nausea or vomiting.  She has no bowel or bladder complaints, though her bowel movements are not quite as regular as they used to be (every other day).  She has no pain issues.  She feels good and has no other concerns.     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     HPI or ONCOLOGIC HISTORY  10/8/2019  1097     2019 HOLLY/BSO/small bowel resection/resection of PA sherry mass and omentectomy.   PATHOLOGY:  FIGO stage [id number] grade 3 right fallopian tube adenocarcinoma with retroperitoneal nodes. 2 omental nodes also found. No gross omental involvement. No residual disease.      2019 - 4/15/2020 Carbo Taxotere x 6 (First cycle single agent carbo due to taxol reaction)   : 1097>20(before C1)>13(p C3)>15(C6)>14.       Myriad myRisk:   Clinically Significant:  HOXB13 c.853del (p.*[id number]*97)  VUS:  MSH3 c.1567G>A (p.[id number])  MSH3 c.2511G>A (p.[id number])    2023  = 11  3/11/2024  = 11  2024  = 9     Of Note:  Patient's maternal grandfather  of prostate cancer and her brother is recently  after undergoing chemo for metastatic prostate cancer.  She has 2 other younger brothers and about 10 first cousins on her mother's side.    REVIEW OF SYSTEMS  Review of Systems   Constitutional:  Negative for appetite change, chills, fatigue, fever and unexpected weight change.   HENT:   Negative for lump/mass and mouth sores.     Respiratory:  Negative for chest tightness, cough and shortness of breath.    Cardiovascular:  Negative for chest pain, leg swelling and palpitations.   Gastrointestinal:  Negative for abdominal distention, abdominal pain, blood in stool, constipation, diarrhea, nausea and vomiting.   Genitourinary:  Negative for dysuria, frequency, vaginal bleeding and vaginal discharge.    Musculoskeletal:  Negative for arthralgias and myalgias.   Skin:  Negative for rash.   Neurological:  Negative for dizziness, light-headedness and numbness.   Hematological:  Negative for adenopathy.   Psychiatric/Behavioral:  Negative for decreased concentration, depression and sleep disturbance. The patient is not nervous/anxious.      OBJECTIVE   Vitals:    03/24/25 1026   BP: 107/77   Pulse: 68        Body mass index is 29.29 kg/m².     Physical Exam:   Constitutional: She is oriented to person, place, and time. She appears well-developed and well-nourished. No distress.    HENT:   Head: Normocephalic and atraumatic.    Eyes: Conjunctivae and EOM are normal.      Pulmonary/Chest: Effort normal. No respiratory distress.        Abdominal: Soft. She exhibits no distension and no mass. There is no abdominal tenderness. There is no rebound and no guarding. No hernia.     Genitourinary:    Genitourinary Comments: Vulva - normal  Vagina - no lesions, vaginal cuff well healed  Cervix - surgically absent  Uterus - surgically absent  Adnexa - no masses                  Neurological: She is alert and oriented to person, place, and time.    Skin: No rash noted.    Psychiatric: She has a normal mood and affect. Her behavior is normal.     ECOG status: 0    LABORATORY DATA  Lab data reviewed.    RADIOLOGICAL DATA  Radiology data reviewed.    PATHOLOGY DATA  Pathology data reviewed.    ASSESSMENT    1. History of cancer of fallopian tube in adulthood      History of Stage ERLQ0xh Grade 3 Right Fallopian Tube Adenocarcinoma - The patient was seen,  examined, and counseled by me.  She remains HIGINIO and is now 5 years out from completion of adjuvant chemotherapy.  I will refer her to [facility] at this point and she will continue with routine surveillance with them. She will contact us promptly if she has any cancer related concerns or questions in the future.  Continued annual  is not recommended but also not unreasonable.  Patient would like to continue annual  indefinitely at this time.     PLAN  Follow-up with [facility] in 1 year            Frederick Stockton MD    ONGOING COMPLEXITY BILLING  Visit today is associated with current or anticipated ongoing medical care related to this patients single serious condition/complex condition: fallopian tube cancer       Pelvic exam was done requiring a female chaperone

## 2025-03-25 ENCOUNTER — TELEPHONE (OUTPATIENT)
Dept: GYNECOLOGIC ONCOLOGY | Facility: CLINIC | Age: 70
End: 2025-03-25
Payer: MEDICARE

## 2025-03-25 NOTE — TELEPHONE ENCOUNTER
----- Message from Suzette Prado sent at 3/25/2025 10:27 AM CDT -----  Regarding: Dr Padilla's office needs demographics for patient  Hi staff,Aliza from Dr Padilla's office and the [facility] called saying they received a referral with only progress notes and wants to know if a demographics sheet can be faxed to [phone].If you need more information Aliza can be reached at [phone].Thanks,Eve

## 2025-03-28 ENCOUNTER — HOSPITAL ENCOUNTER (OUTPATIENT)
Dept: RADIOLOGY | Facility: CLINIC | Age: 70
Discharge: HOME | End: 2025-03-28
Attending: INTERNAL MEDICINE
Payer: MEDICARE

## 2025-03-28 DIAGNOSIS — Z78.0 ASYMPTOMATIC MENOPAUSAL STATE: ICD-10-CM

## 2025-03-28 PROCEDURE — 77080 DXA BONE DENSITY AXIAL: CPT | Mod: 26,,, | Performed by: INTERNAL MEDICINE

## 2025-03-28 PROCEDURE — 77080 DXA BONE DENSITY AXIAL: CPT | Mod: TC

## 2025-04-02 ENCOUNTER — RESULTS FOLLOW-UP (OUTPATIENT)
Dept: PRIMARY CARE CLINIC | Facility: CLINIC | Age: 70
End: 2025-04-02

## 2025-04-03 ENCOUNTER — CLINICAL SUPPORT (OUTPATIENT)
Dept: OTHER | Facility: CLINIC | Age: 70
End: 2025-04-03

## 2025-04-03 DIAGNOSIS — Z00.8 ENCOUNTER FOR OTHER GENERAL EXAMINATION: ICD-10-CM

## 2025-04-04 VITALS
WEIGHT: 170 LBS | DIASTOLIC BLOOD PRESSURE: 77 MMHG | HEIGHT: 64 IN | SYSTOLIC BLOOD PRESSURE: 112 MMHG | BODY MASS INDEX: 29.02 KG/M2

## 2025-04-04 LAB
GLUCOSE SERPL-MCNC: 91 MG/DL (ref 60–140)
HDLC SERPL-MCNC: 33 MG/DL
POC CHOLESTEROL, LDL (DOCK): 182 MG/DL
POC CHOLESTEROL, TOTAL: 261 MG/DL
TRIGL SERPL-MCNC: 241 MG/DL

## 2025-04-08 ENCOUNTER — RESULTS FOLLOW-UP (OUTPATIENT)
Dept: OTHER | Facility: CLINIC | Age: 70
End: 2025-04-08

## 2025-04-21 ENCOUNTER — HOSPITAL ENCOUNTER (OUTPATIENT)
Dept: PREADMISSION TESTING | Facility: HOSPITAL | Age: 70
Discharge: HOME | End: 2025-04-21
Attending: OPHTHALMOLOGY
Payer: MEDICARE

## 2025-04-21 NOTE — PRE-PROCEDURE INSTRUCTIONS
Pre-operative instructions, medication directives and pain scales reviewed with patient. All questions the patient had were answered. Re-assurance about surgical procedure and day of surgery routine given as needed, patient verbalized understanding of the pre-op instructions.  Patient instructed to report to [facility] for surgery.    Phone preop done.  Arrival time 730 am

## 2025-04-27 RX ORDER — TETRACAINE HYDROCHLORIDE 5 MG/ML
1 SOLUTION OPHTHALMIC
OUTPATIENT
Start: 2025-05-01 | End: 2025-05-01

## 2025-04-27 NOTE — H&P
[facility] ([facility])  History & Physical    Subjective:      Chief Complaint/Reason for Admission:     Liz Coto is a 69 y.o. female.    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    Ovarian cancer      Past Surgical History:   Procedure Laterality Date    BILATERAL SALPINGO-OOPHORECTOMY (BSO) N/A 11/4/2019    Procedure: SALPINGO-OOPHORECTOMY, BILATERAL;  Surgeon: Pérez Shukla MD;  [facility];  Service: OB/GYN;  Laterality: N/A;    COLONOSCOPY N/A 9/29/2020    Procedure: COLONOSCOPY;  Surgeon: ANAHY Fong MD;  Location: [facility] ENDO (4TH FLR);  Service: Endoscopy;  Laterality: N/A;  9/26-[facility]  9/28-confirmed appt-tb    DEBULKING OF TUMOR N/A 11/4/2019    Procedure: DEBULKING, NEOPLASM;  Surgeon: Pérez Shukla MD;  [facility];  Service: OB/GYN;  Laterality: N/A;    ECTOPIC PREGNANCY SURGERY      1981. thinks it was the left     LYSIS OF ADHESIONS N/A 11/4/2019    Procedure: LYSIS, ADHESIONS;  Surgeon: Pérez Shukla MD;  [facility];  Service: OB/GYN;  Laterality: N/A;  Sigmoid    OMENTECTOMY N/A 11/4/2019    Procedure: OMENTECTOMY;  Surgeon: Pérez Shukla MD;  [facility];  Service: OB/GYN;  Laterality: N/A;    PARA-AORTIC LYMPHADENECTOMY N/A 11/4/2019    Procedure: LYMPHADENECTOMY, PARA-AORTIC;  Surgeon: Clarence Colmenares MD;  Location: [facility] OR [address] [place] [address];  Service: General;  Laterality: N/A;    TONSILLECTOMY      TOTAL ABDOMINAL HYSTERECTOMY N/A 11/4/2019    Procedure: HYSTERECTOMY, TOTAL, ABDOMINAL;  Surgeon: Pérez Shukla MD;  Location: [facility] OR [address] [place] [address];  Service: OB/GYN;  Laterality: N/A;     Social History[1]    No  medications prior to admission.     Review of patient's allergies indicates:  No Known Allergies     Review of Systems   Eyes:  Positive for blurred vision.   All other systems reviewed and are negative.        Objective:      Vital Signs (Most Recent)       Vital Signs Range (Last 24H):       Physical Exam  Constitutional:       Appearance: She is well-developed.   HENT:      Head: Normocephalic.   Eyes:      Conjunctiva/sclera: Conjunctivae normal.      Pupils: Pupils are equal, round, and reactive to light.   Cardiovascular:      Rate and Rhythm: Normal rate and regular rhythm.      Heart sounds: Normal heart sounds.   Pulmonary:      Effort: Pulmonary effort is normal.      Breath sounds: Normal breath sounds.   Abdominal:      General: Bowel sounds are normal.      Palpations: Abdomen is soft.   Musculoskeletal:         General: Normal range of motion.      Cervical back: Normal range of motion and neck supple.   Skin:     General: Skin is warm.   Neurological:      Mental Status: She is alert and oriented to person, place, and time.         ASA Score: II    Mallampati Score: II    Plan for Sedation: Moderate    Patient or Family History of Anesthesia problems : No    Any changes affecting the anesthesia assessment which may have changed since the initial assessment and H and P:  No       Data Review:    ECG:     Assessment:      Cataract OS.    Plan:    CE OS.         [1]   Social History  Tobacco Use    Smoking status: Former     Current packs/day: 0.00     Average packs/day: 0.3 packs/day for 20.0 years (5.8 ttl pk-yrs)     Types: Cigarettes     Start date: 10/11/1999     Quit date: 10/11/2019     Years since quittin.5     Passive exposure: Never    Smokeless tobacco: Never    Tobacco comments:     Quit 2019.   Substance Use Topics    Alcohol use: Not Currently    Drug use: No

## 2025-05-01 ENCOUNTER — HOSPITAL ENCOUNTER (OUTPATIENT)
Facility: HOSPITAL | Age: 70
Discharge: HOME | End: 2025-05-01
Attending: OPHTHALMOLOGY | Admitting: OPHTHALMOLOGY
Payer: MEDICARE

## 2025-05-01 ENCOUNTER — ANESTHESIA (OUTPATIENT)
Dept: SURGERY | Facility: HOSPITAL | Age: 70
End: 2025-05-01
Payer: MEDICARE

## 2025-05-01 ENCOUNTER — ANESTHESIA EVENT (OUTPATIENT)
Dept: SURGERY | Facility: HOSPITAL | Age: 70
End: 2025-05-01
Payer: MEDICARE

## 2025-05-01 VITALS
WEIGHT: 169 LBS | HEIGHT: 64 IN | SYSTOLIC BLOOD PRESSURE: 113 MMHG | OXYGEN SATURATION: 96 % | TEMPERATURE: 97 F | BODY MASS INDEX: 28.85 KG/M2 | RESPIRATION RATE: 18 BRPM | HEART RATE: 66 BPM | DIASTOLIC BLOOD PRESSURE: 80 MMHG

## 2025-05-01 DIAGNOSIS — H25.12 NUCLEAR SCLEROTIC CATARACT OF LEFT EYE: Primary | ICD-10-CM

## 2025-05-01 PROCEDURE — 36000707: Performed by: OPHTHALMOLOGY

## 2025-05-01 PROCEDURE — 37000008 HC ANESTHESIA 1ST 15 MINUTES: Performed by: OPHTHALMOLOGY

## 2025-05-01 PROCEDURE — 37000009 HC ANESTHESIA EA ADD 15 MINS: Performed by: OPHTHALMOLOGY

## 2025-05-01 PROCEDURE — V2632 POST CHMBR INTRAOCULAR LENS: HCPCS | Performed by: OPHTHALMOLOGY

## 2025-05-01 PROCEDURE — 66984 XCAPSL CTRC RMVL W/O ECP: CPT | Mod: LT,,, | Performed by: OPHTHALMOLOGY

## 2025-05-01 PROCEDURE — 63600175 PHARM REV CODE 636 W HCPCS: Performed by: NURSE ANESTHETIST, CERTIFIED REGISTERED

## 2025-05-01 PROCEDURE — 25000003 PHARM REV CODE 250: Performed by: OPHTHALMOLOGY

## 2025-05-01 PROCEDURE — 71000015 HC POSTOP RECOV 1ST HR: Performed by: OPHTHALMOLOGY

## 2025-05-01 PROCEDURE — 25000003 PHARM REV CODE 250: Performed by: NURSE ANESTHETIST, CERTIFIED REGISTERED

## 2025-05-01 PROCEDURE — 36000706: Performed by: OPHTHALMOLOGY

## 2025-05-01 PROCEDURE — 63600175 PHARM REV CODE 636 W HCPCS: Performed by: OPHTHALMOLOGY

## 2025-05-01 DEVICE — CLAREON ASPHERIC HYDROPHOBIC ACRYLIC IOL WITH THE AUTONOMEAUTOMATED PRE-LOADED DELIVERY SYSTEM
Type: IMPLANTABLE DEVICE | Site: EYE | Status: FUNCTIONAL
Brand: CLAREON™

## 2025-05-01 RX ORDER — ACETAMINOPHEN 325 MG/1
650 TABLET ORAL EVERY 4 HOURS PRN
Status: DISCONTINUED | OUTPATIENT
Start: 2025-05-01 | End: 2025-05-01 | Stop reason: HOSPADM

## 2025-05-01 RX ORDER — LIDOCAINE HYDROCHLORIDE 20 MG/ML
INJECTION, SOLUTION INFILTRATION; PERINEURAL
Status: DISCONTINUED | OUTPATIENT
Start: 2025-05-01 | End: 2025-05-01 | Stop reason: HOSPADM

## 2025-05-01 RX ORDER — ONDANSETRON HYDROCHLORIDE 2 MG/ML
INJECTION, SOLUTION INTRAVENOUS
Status: DISCONTINUED | OUTPATIENT
Start: 2025-05-01 | End: 2025-05-01

## 2025-05-01 RX ORDER — PREDNISOLONE ACETATE 10 MG/ML
SUSPENSION/ DROPS OPHTHALMIC
Status: DISCONTINUED | OUTPATIENT
Start: 2025-05-01 | End: 2025-05-01 | Stop reason: HOSPADM

## 2025-05-01 RX ORDER — FENTANYL CITRATE 50 UG/ML
INJECTION, SOLUTION INTRAMUSCULAR; INTRAVENOUS
Status: DISCONTINUED | OUTPATIENT
Start: 2025-05-01 | End: 2025-05-01

## 2025-05-01 RX ORDER — CYCLOP/TROP/PROPA/PHEN/KET/WAT 1-1-0.1%
1 DROPS (EA) OPHTHALMIC (EYE)
Status: COMPLETED | OUTPATIENT
Start: 2025-05-01 | End: 2025-05-01

## 2025-05-01 RX ORDER — POVIDONE-IODINE 5 %
SOLUTION, NON-ORAL OPHTHALMIC (EYE)
Status: DISCONTINUED | OUTPATIENT
Start: 2025-05-01 | End: 2025-05-01 | Stop reason: HOSPADM

## 2025-05-01 RX ORDER — OFLOXACIN 3 MG/ML
SOLUTION/ DROPS OPHTHALMIC
Status: DISCONTINUED | OUTPATIENT
Start: 2025-05-01 | End: 2025-05-01 | Stop reason: HOSPADM

## 2025-05-01 RX ORDER — MIDAZOLAM HYDROCHLORIDE 1 MG/ML
INJECTION INTRAMUSCULAR; INTRAVENOUS
Status: DISCONTINUED | OUTPATIENT
Start: 2025-05-01 | End: 2025-05-01

## 2025-05-01 RX ORDER — OFLOXACIN 3 MG/ML
1 SOLUTION/ DROPS OPHTHALMIC
Status: COMPLETED | OUTPATIENT
Start: 2025-05-01 | End: 2025-05-01

## 2025-05-01 RX ORDER — SODIUM CHLORIDE 9 MG/ML
INJECTION, SOLUTION INTRAVENOUS CONTINUOUS
Status: DISCONTINUED | OUTPATIENT
Start: 2025-05-01 | End: 2025-05-01 | Stop reason: HOSPADM

## 2025-05-01 RX ORDER — HYDROCODONE BITARTRATE AND ACETAMINOPHEN 5; 325 MG/1; MG/1
1 TABLET ORAL EVERY 4 HOURS PRN
Refills: 0 | Status: DISCONTINUED | OUTPATIENT
Start: 2025-05-01 | End: 2025-05-01 | Stop reason: HOSPADM

## 2025-05-01 RX ADMIN — FENTANYL CITRATE 25 MCG: 0.05 INJECTION, SOLUTION INTRAMUSCULAR; INTRAVENOUS at 10:05

## 2025-05-01 RX ADMIN — MIDAZOLAM HYDROCHLORIDE 1 MG: 1 INJECTION INTRAMUSCULAR; INTRAVENOUS at 10:05

## 2025-05-01 RX ADMIN — Medication 1 DROP: at 07:05

## 2025-05-01 RX ADMIN — OFLOXACIN 1 DROP: 3 SOLUTION/ DROPS OPHTHALMIC at 07:05

## 2025-05-01 RX ADMIN — SODIUM CHLORIDE: 0.9 INJECTION, SOLUTION INTRAVENOUS at 10:05

## 2025-05-01 RX ADMIN — ONDANSETRON 4 MG: 2 INJECTION, SOLUTION INTRAMUSCULAR; INTRAVENOUS at 10:05

## 2025-05-01 RX ADMIN — ACETAMINOPHEN 650 MG: 325 TABLET ORAL at 11:05

## 2025-05-01 RX ADMIN — FENTANYL CITRATE 25 MCG: 0.05 INJECTION, SOLUTION INTRAMUSCULAR; INTRAVENOUS at 11:05

## 2025-05-01 RX ADMIN — Medication 1 DROP: at 08:05

## 2025-05-01 RX ADMIN — OFLOXACIN 1 DROP: 3 SOLUTION/ DROPS OPHTHALMIC at 08:05

## 2025-05-01 NOTE — TRANSFER OF CARE
"Anesthesia Transfer of Care Note    Patient: Liz Coto    Procedure(s) Performed: Procedure(s) (LRB):  PHACOEMULSIFICATION, CATARACT (Left)  INSERTION, IOL PROSTHESIS (Left)    Patient location: [place] [facility]    Anesthesia Type: MAC    Transport from OR: Transported from OR on room air with adequate spontaneous ventilation    Post pain: adequate analgesia    Post assessment: no apparent anesthetic complications and tolerated procedure well    Post vital signs: stable    Level of consciousness: awake, alert and oriented    Nausea/Vomiting: no nausea/vomiting    Complications: none    Transfer of care protocol was followed      Last vitals: Visit Vitals  /80   Pulse 66   Temp 36.3 °C (97.4 °F) (Oral)   Resp 18   Ht 5' 4" (1.626 m)   Wt 76.7 kg (169 lb)   LMP  (LMP Unknown)   SpO2 96%   BMI 29.01 kg/m²     "

## 2025-05-01 NOTE — BRIEF OP NOTE
[facility] - Surgery  Brief Operative Note    Surgery Date: 5/1/2025     Surgeons and Role:     * Kenney Courtney MD - Primary    Assisting Surgeon: None    Pre-op Diagnosis:  NS (nuclear sclerosis), left [H25.12]    Post-op Diagnosis:  Post-Op Diagnosis Codes:     * NS (nuclear sclerosis), left [H25.12]    Procedure(s) (LRB):  PHACOEMULSIFICATION, CATARACT (Left)  INSERTION, IOL PROSTHESIS (Left)    Anesthesia: Local MAC    Operative Findings:     Estimated Blood Loss: * No values recorded between 5/1/2025 10:36 AM and 5/1/2025 11:13 AM *         Specimens:   Specimen (24h ago, onward)      None            * No specimens in log *        Discharge Note    OUTCOME: Patient tolerated treatment/procedure well without complication and is now ready for discharge.    DISPOSITION: Home or Self Care    FINAL DIAGNOSIS:  Nuclear sclerotic cataract of left eye    FOLLOWUP: In clinic    DISCHARGE INSTRUCTIONS:    Discharge Procedure Orders   Other restrictions (specify):   Order Comments: No heavy lifting or bending for 1 week.

## 2025-05-01 NOTE — ANESTHESIA PREPROCEDURE EVALUATION
[facility]  Anesthesia Pre-Operative Evaluation         Patient Name: Liz Coto  YOB: 1955  MRN: [id number]    SUBJECTIVE:     Pre-operative evaluation for Procedure(s) (LRB):  PHACOEMULSIFICATION, CATARACT (Left)  INSERTION, IOL PROSTHESIS (Left)     05/01/2025    Liz Coto is a 69 y.o. female w/ a significant PMHx of CKD, ovarian cancer s/p chemotherapy and total hysterectomy/BSO.    Patient now presents for the above procedure(s).      LDA: None documented.    Prev airway: None documented.    Drips: None documented.    Problem List[1]    Review of patient's allergies indicates:  No Known Allergies    Current Outpatient Medications:  Current Medications[2]    Past Surgical History:   Procedure Laterality Date    BILATERAL SALPINGO-OOPHORECTOMY (BSO) N/A 11/4/2019    Procedure: SALPINGO-OOPHORECTOMY, BILATERAL;  Surgeon: Pérez Shukla MD;  Location: [facility] OR [address] [place] [address];  Service: OB/GYN;  Laterality: N/A;    COLONOSCOPY N/A 9/29/2020    Procedure: COLONOSCOPY;  Surgeon: ANAHY Fong MD;  Location: [facility] ENDO (4TH FLR);  Service: Endoscopy;  Laterality: N/A;  [facility]  9/[facility]-confirmed appt-tb    DEBULKING OF TUMOR N/A 11/4/2019    Procedure: DEBULKING, NEOPLASM;  Surgeon: Pérez Shukla MD;  Location: [address];  Service: OB/GYN;  Laterality: N/A;    ECTOPIC PREGNANCY SURGERY      1981. thinks it was the left     LYSIS OF ADHESIONS N/A 11/4/2019    Procedure: LYSIS, ADHESIONS;  Surgeon: Pérez Shukla MD;  Location: [facility] OR [address] [place] [address];  Service: OB/GYN;  Laterality: N/A;  Sigmoid    OMENTECTOMY N/A 11/4/2019    Procedure: OMENTECTOMY;  Surgeon: Pérez Shukla MD;  Location: [facility] OR [address] [place] [address];  Service: OB/GYN;  Laterality: N/A;    PARA-AORTIC LYMPHADENECTOMY N/A 11/4/2019    Procedure: LYMPHADENECTOMY, PARA-AORTIC;  Surgeon: Clarence Colmenares MD;  Location: [facility] OR 2ND FLR;  Service: General;  Laterality: N/A;    TONSILLECTOMY       TOTAL ABDOMINAL HYSTERECTOMY N/A 11/4/2019    Procedure: HYSTERECTOMY, TOTAL, ABDOMINAL;  Surgeon: Pérez Shukla MD;  Location: [facility] OR [address] [place] [address];  Service: OB/GYN;  Laterality: N/A;       Social History[3]    OBJECTIVE:     Vital Signs Range (Last 24H):  Temp:  [36.5 °C (97.7 °F)]   Pulse:  [78]   Resp:  [18]   BP: (107)/(84)   SpO2:  [95 %]       Significant Labs:  Lab Results   Component Value Date    WBC 6.74 02/10/2025    HGB 13.7 02/10/2025    HCT 42.4 02/10/2025     02/10/2025    CHOL 240 (H) 02/10/2025    TRIG 80 02/10/2025    HDL 64 02/10/2025    ALT 11 02/10/2025    AST 19 02/10/2025     02/10/2025    K 4.2 02/10/2025     02/10/2025    CREATININE 1.2 02/10/2025    BUN 13 02/10/2025    CO2 27 02/10/2025    TSH 2.395 03/02/2020    INR 0.9 03/02/2020    HGBA1C 5.5 02/05/2024       Diagnostic Studies: No relevant studies.    EKG:   Results for orders placed or performed in visit on 11/08/21   IN OFFICE EKG 12-LEAD (to Louisa)    Collection Time: 11/08/21  9:03 AM    Narrative    Test Reason : R06.00,    Vent. Rate : 074 BPM     Atrial Rate : 074 BPM     P-R Int : 144 ms          QRS Dur : 076 ms      QT Int : 348 ms       P-R-T Axes : 066 045 053 degrees     QTc Int : 386 ms    Normal sinus rhythm  Nonspecific T wave abnormality Less prominent than previously  Abnormal ECG  When compared with ECG of 10-OCT-2019 15:49,  T wave inversion no longer evident in Inferior leads  Confirmed by DURGA LUONG MD ([id number]) on 11/9/2021 10:28:11 AM    Referred By: JAROCHO ONEIL           Confirmed By:DURGA LUONG MD       2D ECHO:  TTE:  No results found for this or any previous visit.    SULMA:  No results found for this or any previous visit.    ASSESSMENT/PLAN:           Pre-op Assessment    I have reviewed the Patient Summary Reports.     I have reviewed the Nursing Notes. I have reviewed the NPO Status.   I have reviewed the Medications.     Review of Systems  Anesthesia Hx:  No problems with  previous Anesthesia                Social:  Non-Smoker, Social Alcohol Use       Hematology/Oncology:                      Current/Recent Cancer. (Ovarian cancer s/p total hysterectomy/BSO with chemotherapy)                Cardiovascular:  Cardiovascular Normal                                              Pulmonary:  Pulmonary Normal                       Renal/:  Chronic Renal Disease, CKD                Hepatic/GI:  Hepatic/GI Normal                    Neurological:  Neurology Normal                                          Physical Exam  General: Well nourished, Cooperative, Alert and Oriented    Airway:  Mallampati: II   Mouth Opening: Normal  TM Distance: Normal  Tongue: Normal  Neck ROM: Normal ROM    Dental:  Intact    Chest/Lungs:  Normal Respiratory Rate    Heart:  Rate: Normal  Rhythm: Regular Rhythm        Anesthesia Plan  Type of Anesthesia, risks & benefits discussed:    Anesthesia Type: MAC  Intra-op Monitoring Plan: Standard ASA Monitors  Post Op Pain Control Plan: multimodal analgesia  Informed Consent: Informed consent signed with the Patient and all parties understand the risks and agree with anesthesia plan.  All questions answered. Patient consented to blood products? No  ASA Score: 2    Ready For Surgery From Anesthesia Perspective.     .           [1]   Patient Active Problem List  Diagnosis    S/P abdominal hysterectomy and left salpingo-oophorectomy, omentectomy, LND    History of cancer of fallopian tube in adulthood    Alopecia    Anemia associated with chemotherapy    Refractive error    Nuclear sclerosis of both eyes    At increased risk of breast cancer    Monoallelic mutation of CFTR gene    Monoallelic mutation of HOXB13 gene    Osteopenia    Chronic kidney disease, stage 3a   [2]   Current Facility-Administered Medications:     0.9% NaCl infusion, , Intravenous, Continuous, Kenney Courtney MD  [3]   Social History  Socioeconomic History    Marital status: Single    Number of  children: 0   Tobacco Use    Smoking status: Former     Current packs/day: 0.00     Average packs/day: 0.3 packs/day for 20.0 years (5.8 ttl pk-yrs)     Types: Cigarettes     Start date: 10/11/1999     Quit date: 10/11/2019     Years since quittin.5     Passive exposure: Never    Smokeless tobacco: Never    Tobacco comments:     Quit 2019.   Substance and Sexual Activity    Alcohol use: Not Currently    Drug use: No    Sexual activity: Not Currently     Partners: Male     Birth control/protection: None, Post-menopausal     Social Drivers of Health     Financial Resource Strain: Low Risk  (2024)    Overall Financial Resource Strain (CARDIA)     Difficulty of Paying Living Expenses: Not hard at all   Food Insecurity: No Food Insecurity (2024)    Hunger Vital Sign     Worried About Running Out of Food in the Last Year: Never true     Ran Out of Food in the Last Year: Never true   Transportation Needs: No Transportation Needs (2024)    PRAPARE - Transportation     Lack of Transportation (Medical): No     Lack of Transportation (Non-Medical): No   Physical Activity: Sufficiently Active (2024)    Exercise Vital Sign     Days of Exercise per Week: 3 days     Minutes of Exercise per Session: 120 min   Stress: No Stress Concern Present (2024)    [employer] - Occupational Stress Questionnaire     Feeling of Stress : Not at all   Housing Stability: Unknown (2024)    Housing Stability Vital Sign     Unable to Pay for Housing in the Last Year: No     Homeless in the Last Year: No

## 2025-05-01 NOTE — DISCHARGE INSTRUCTIONS
Post Operative Instructions for   Cataract Surgery- [facility]      SUNITA GRAHAM M.D.   OPHTHALMOLOGY               STARTING THE SAME DAY OF SURGERY:   Place one (1) drop of Prednisolone-Moxifloxacin-Bromfenac (shake bottle), into the operative eye three (3) time a day (Morning, Noon, and Nighttime).       You will use this drop for four (4) weeks following surgery.             1 DAY POST OPERATIVE APPOINTMENT:          Date:  5/2/25/ Time: 10:30 am      Location:  [facility] [address] [place] [address]            RESTRICTIONS FOR SEVEN (7) DAYS FOLLOWING SURGERY:   DO NOT lift anything over 10 pounds.   DO NOT bend at the waist, only at the knees (keep head in upright position).    DO NOT rub your eye.    DO NOT get any water into the eye.    DO NOT wear any makeup, lotions, or creams on/around the eye.   Wear the protective eye shield you were given after surgery anytime you go to sleep.  You may remove the shield while awake.        No driving, drinking alcohol, or signing legal documents for the next 24 hours.         PLEASE NOTE:       You may take over the counter pain medication such as Tylenol or Ibuprofen as directed, if needed for pain.    Last dose of Tylenol 11:27 am    ***If you experience severe pain, loss of vision, sudden onset of flashes and/or floaters,     IMMEDIATELY CALL Dr. Snell Office: [phone], AFTER HOURs: [phone] OR proceed to the EMERGENCY ROOM***.      Fall Prevention  Millions of people fall every year and injure themselves. You may have had anesthesia or sedation which may increase your risk of falling. You may have health issues that put you at an increased risk of falling.     Here are ways to reduce your risk of falling.    Make your home safe by keeping walkways clear of objects you may trip over.  Use non-slip pads under rugs. Do not use area rugs or small throw rugs.  Use non-slip mats in bathtubs and showers.  Install  handrails and lights on staircases.  Do not walk in poorly lit areas.  Do not stand on chairs or wobbly ladders.  Use caution when reaching overhead or looking upward. This position can cause a loss of balance.  Be sure your shoes fit properly, have non-slip bottoms and are in good condition.   Wear shoes both inside and out. Avoid going barefoot or wearing slippers.  Be cautious when going up and down stairs, curbs, and when walking on uneven sidewalks.  If your balance is poor, consider using a cane or walker.  If your fall was related to alcohol use, stop or limit alcohol intake.   If your fall was related to use of sleeping medicines, talk to your doctor about this. You may need to reduce your dosage at bedtime if you awaken during the night to go to the bathroom.    To reduce the need for nighttime bathroom trips:  Avoid drinking fluids for several hours before going to bed  Empty your bladder before going to bed  Men can keep a urinal at the bedside  Stay as active as you can. Balance, flexibility, strength, and endurance all come from exercise. They all play a role in preventing falls. Ask your healthcare provider which types of activity are right for you.  Get your vision checked on a regular basis.  If you have pets, know where they are before you stand up or walk so you don't trip over them.  Use night lights.

## 2025-05-01 NOTE — OP NOTE
Operative Date:  05/01/2025    Discharge Date:  05/01/2025    Discharge Patient Home    Report Title: Operative Note      SURGEON: Kenney Courtney MD     ASSISTANT:     PREOPERATIVE DIAGNOSIS: Visually significant NSC cataract,  Left Eye.    POSTOPERATIVE DIAGNOSIS: Visually-significant NSC cataract,  Left Eye.    PROCEDURE PERFORMED: Phacoemulsification of the cataract with posterior chamber intraocular lens Left Eye.    ANESTHESIA: Topical with MAC     COMPLICATIONS: None    ESTIMATED BLOOD LOSS: Minimal    INDICATIONS FOR PROCEDURE:   The patient is a pleasant 69 year old woman with increasing difficulties with activities of daily living secondary to a dense visually significant cataract in the Left Eye.  Discussions have been carried out with this patient concerning the options to surgery, risks, benefits and expectations.  The patient voiced good understanding and wished to proceed with the above procedure.    PROCEDURE IN DETAIL: The patient was brought to the operating room and received topical anesthetic to the eye and then was prepped and draped in the usual sterile fashion.  Using the Mcintyre ring and the guarded trish blade set at 0.37 mm, a partial thickness clear cornea incision was made temporally.  The paracentesis site was made at the six o'clock position.  Omidria was injected into the anterior chamber through the paracentesis.  Viscoat was then injected into the anterior chamber.  The eye was then reentered at the primary surgical site with a 2.4 mm keratome followed by continuous capsulotomy, hydrodissection, hydrodelineation and phacoemulsification of the cataract.  Residual cortical material was removed using automated irrigation-aspiration technique.  Healon was injected into the posterior chamber and a CNAOTO 22.0 diopter lens was placed in the bag without difficulty. Residual viscoelastic was removed using automated irrigation-aspiration technique. The eye was re-pressurized using  BSS solution and both the paracentesis site and the primary surgical site were demonstrated to be watertight at the end of the case with Weck--Mini manipulation.  One drop of Ofloxacin and one drop of Pred acetate 1% was applied to the Left Eye .The eye was closed, patched and a Swartz shield placed.  The patient was taken to the recovery room in good and stable condition.  The patient tolerated the procedure well.  The patient was instructed to refrain from any heavy lifting, bending, stooping or straining activities, discharged home  and to follow-up in the morning for routine postoperative care with Kenney Courtney MD.

## 2025-05-02 ENCOUNTER — OFFICE VISIT (OUTPATIENT)
Dept: OPHTHALMOLOGY | Facility: CLINIC | Age: 70
End: 2025-05-02
Payer: MEDICARE

## 2025-05-02 DIAGNOSIS — Z98.890 POST-OPERATIVE STATE: Primary | ICD-10-CM

## 2025-05-02 PROCEDURE — 1160F RVW MEDS BY RX/DR IN RCRD: CPT | Mod: CPTII,S$GLB,, | Performed by: OPHTHALMOLOGY

## 2025-05-02 PROCEDURE — 99024 POSTOP FOLLOW-UP VISIT: CPT | Mod: S$GLB,,, | Performed by: OPHTHALMOLOGY

## 2025-05-02 PROCEDURE — 1159F MED LIST DOCD IN RCRD: CPT | Mod: CPTII,S$GLB,, | Performed by: OPHTHALMOLOGY

## 2025-05-02 PROCEDURE — 1157F ADVNC CARE PLAN IN RCRD: CPT | Mod: CPTII,S$GLB,, | Performed by: OPHTHALMOLOGY

## 2025-05-02 PROCEDURE — 99999 PR PBB SHADOW E&M-EST. PATIENT-LVL II: CPT | Mod: PBBFAC,,, | Performed by: OPHTHALMOLOGY

## 2025-05-02 NOTE — LETTER
May 2, 2025      [facility]  [address]  [place] [address]  Phone: [phone]  Fax: [phone]       Patient: Liz Coto   YOB: 1955  Date of Visit: 05/02/2025    To Whom It May Concern:    Dina Coto  was at [facility] on 05/02/2025 for her 1 day post op cataract surgery follow up.  The patient will have surgery restrictions for the next week, please excuse her for May 5, 2025 through May 9, 2025. The patient may return to work on May 12, 2025 with no restrictions. If you have any questions or concerns, or if I can be of further assistance, please do not hesitate to contact me.    Sincerely,    Kenney Courtney MD

## 2025-05-02 NOTE — ANESTHESIA POSTPROCEDURE EVALUATION
Anesthesia Post Evaluation    Patient: Liz Coto    Procedure(s) Performed: Procedure(s) (LRB):  PHACOEMULSIFICATION, CATARACT (Left)  INSERTION, IOL PROSTHESIS (Left)    Final Anesthesia Type: MAC      Patient location during evaluation: PACU  Patient participation: Yes- Able to Participate  Level of consciousness: awake and alert and oriented  Post-procedure vital signs: reviewed and stable  Pain management: adequate  Airway patency: patent    PONV status at discharge: No PONV  Anesthetic complications: no      Cardiovascular status: blood pressure returned to baseline and hemodynamically stable  Respiratory status: unassisted, spontaneous ventilation and room air  Hydration status: euvolemic  Follow-up not needed.              Vitals Value Taken Time   /80 05/01/25 11:15   Temp 36.3 °C (97.4 °F) 05/01/25 11:15   Pulse 66 05/01/25 11:15   Resp 16 05/01/25 11:15   SpO2 96 % 05/01/25 11:15         No case tracking events are documented in the log.      Pain/Caity Score: Pain Rating Prior to Med Admin: 3 (5/1/2025 11:27 AM)  Pain Rating Post Med Admin: 0 (5/1/2025 12:00 PM)  Caity Score: 10 (5/1/2025 12:00 PM)

## 2025-05-04 NOTE — PROGRESS NOTES
Subjective:       Patient ID: Liz Coto is a 69 y.o. female.    Chief Complaint: Post-op Evaluation    HPI    Here for 1 day S/p Phaco w/IOL OS 05-01-25    Eye meds: PMB OS TID     69 year old female had patch removed and area cleaned. Denies ocular pain.   States she would like a work note so she can work at home while she has   restrictions   Last edited by Poppy Larson on 5/2/2025 10:38 AM.             Assessment:       1. Post-operative state        Plan:       S/p CE OS- Doing well.        CPM OS.  RTC 1 wk.

## 2025-05-05 ENCOUNTER — HOSPITAL ENCOUNTER (OUTPATIENT)
Dept: PREADMISSION TESTING | Facility: HOSPITAL | Age: 70
Discharge: HOME | End: 2025-05-05
Attending: OPHTHALMOLOGY
Payer: MEDICARE

## 2025-05-08 ENCOUNTER — OFFICE VISIT (OUTPATIENT)
Dept: OPHTHALMOLOGY | Facility: CLINIC | Age: 70
End: 2025-05-08
Payer: MEDICARE

## 2025-05-08 DIAGNOSIS — Z98.890 POST-OPERATIVE STATE: Primary | ICD-10-CM

## 2025-05-08 DIAGNOSIS — H25.11 NS (NUCLEAR SCLEROSIS), RIGHT: ICD-10-CM

## 2025-05-08 PROCEDURE — 1160F RVW MEDS BY RX/DR IN RCRD: CPT | Mod: CPTII,S$GLB,, | Performed by: OPHTHALMOLOGY

## 2025-05-08 PROCEDURE — 99024 POSTOP FOLLOW-UP VISIT: CPT | Mod: S$GLB,,, | Performed by: OPHTHALMOLOGY

## 2025-05-08 PROCEDURE — 1101F PT FALLS ASSESS-DOCD LE1/YR: CPT | Mod: CPTII,S$GLB,, | Performed by: OPHTHALMOLOGY

## 2025-05-08 PROCEDURE — 99999 PR PBB SHADOW E&M-EST. PATIENT-LVL II: CPT | Mod: PBBFAC,,, | Performed by: OPHTHALMOLOGY

## 2025-05-08 PROCEDURE — 1126F AMNT PAIN NOTED NONE PRSNT: CPT | Mod: CPTII,S$GLB,, | Performed by: OPHTHALMOLOGY

## 2025-05-08 PROCEDURE — 1157F ADVNC CARE PLAN IN RCRD: CPT | Mod: CPTII,S$GLB,, | Performed by: OPHTHALMOLOGY

## 2025-05-08 PROCEDURE — 92136 OPHTHALMIC BIOMETRY: CPT | Mod: 26,RT,S$GLB, | Performed by: OPHTHALMOLOGY

## 2025-05-08 PROCEDURE — 1159F MED LIST DOCD IN RCRD: CPT | Mod: CPTII,S$GLB,, | Performed by: OPHTHALMOLOGY

## 2025-05-08 PROCEDURE — 3288F FALL RISK ASSESSMENT DOCD: CPT | Mod: CPTII,S$GLB,, | Performed by: OPHTHALMOLOGY

## 2025-05-08 NOTE — PROGRESS NOTES
Subjective:       Patient ID: Liz Coto is a 69 y.o. female.    Chief Complaint: Post-op Evaluation    HPI    DLS: 5/2/25    PMB TID OS    Pt here for 1 week post op OS.  Pt states doing well. Pt denies eye pain   OS.   Last edited by Tricia Powell MA on 5/8/2025  1:51 PM.             Assessment:       1. Post-operative state    2. NS (nuclear sclerosis), right        Plan:       S/p CE OS- Doing well.    Visually significant cataract OD -Pt. Wants Sx.        CPM OS.  Cataract Surgery Consent: Patient with a visually significant cataract with difficulties of ADLs, reading, driving, night vision, glare (any and all).  Discussed with Patient/Family/Caregiver: options, risks and benefits, expectations of cataract surgery, utilized an eye model with questions and answers to facilitate discussion.  Discussed lens options and patient understands that glasses may be required for optimal vision for distance and/or near vision after cataract surgery.  The Patient/Family/Caregiver  voice good understanding and patient wishes to proceed with surgery.  The patient will likely benefit from surgery and patient signed consent for Right Eye.  CE OD 5/15/25.

## 2025-05-11 RX ORDER — TETRACAINE HYDROCHLORIDE 5 MG/ML
1 SOLUTION OPHTHALMIC
OUTPATIENT
Start: 2025-05-15 | End: 2025-05-15

## 2025-05-11 NOTE — H&P
[facility] ([facility])  History & Physical    Subjective:      Chief Complaint/Reason for Admission:     Liz Coto is a 69 y.o. female.    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    Ovarian cancer      Past Surgical History:   Procedure Laterality Date    BILATERAL SALPINGO-OOPHORECTOMY (BSO) N/A 11/04/2019    Procedure: SALPINGO-OOPHORECTOMY, BILATERAL;  Surgeon: Pérez Shukla MD;  [facility];  Service: OB/GYN;  Laterality: N/A;    CATARACT EXTRACTION      COLONOSCOPY N/A 09/29/2020    Procedure: COLONOSCOPY;  Surgeon: ANAHY Fong MD;  Location: [facility] ENDO (4TH FLR);  Service: Endoscopy;  Laterality: N/A;  9/26-covid [facility]-tb  9/28-confirmed appt-tb    DEBULKING OF TUMOR N/A 11/04/2019    Procedure: DEBULKING, NEOPLASM;  Surgeon: Pérez Shukla MD;  [facility];  Service: OB/GYN;  Laterality: N/A;    ECTOPIC PREGNANCY SURGERY      1981. thinks it was the left     INTRAOCULAR PROSTHESES INSERTION Left 05/01/2025    Procedure: INSERTION, IOL PROSTHESIS;  Surgeon: Kenney Courtney MD;  Location: [facility] OR;  Service: Ophthalmology;  Laterality: Left;    LYSIS OF ADHESIONS N/A 11/04/2019    Procedure: LYSIS, ADHESIONS;  Surgeon: Pérez Shukla MD;  [facility];  Service: OB/GYN;  Laterality: N/A;  Sigmoid    OMENTECTOMY N/A 11/04/2019    Procedure: OMENTECTOMY;  Surgeon: Pérez Shukla MD;  [facility];  Service: OB/GYN;  Laterality: N/A;    PARA-AORTIC LYMPHADENECTOMY N/A 11/04/2019    Procedure: LYMPHADENECTOMY, PARA-AORTIC;  Surgeon: Clarence Colmenares MD;  Location: [facility] OR [address] [place] [address];  Service: General;  Laterality: N/A;     PHACOEMULSIFICATION OF CATARACT Left 05/01/2025    Procedure: PHACOEMULSIFICATION, CATARACT;  Surgeon: Kenney Courtney MD;  Location: [facility];  Service: Ophthalmology;  Laterality: Left;  RN PHONE PREOP 4/21/2025  ARRIVAL 730 AM    TONSILLECTOMY      TOTAL ABDOMINAL HYSTERECTOMY N/A 11/04/2019    Procedure: HYSTERECTOMY, TOTAL, ABDOMINAL;  Surgeon: Pérez Shukla MD;  Location: [facility] OR [address] [place] [address];  Service: OB/GYN;  Laterality: N/A;     Social History[1]    No medications prior to admission.     Review of patient's allergies indicates:  No Known Allergies     Review of Systems   Eyes:  Positive for blurred vision.   All other systems reviewed and are negative.        Objective:      Vital Signs (Most Recent)       Vital Signs Range (Last 24H):       Physical Exam  Constitutional:       Appearance: She is well-developed.   HENT:      Head: Normocephalic.   Eyes:      Conjunctiva/sclera: Conjunctivae normal.      Pupils: Pupils are equal, round, and reactive to light.   Cardiovascular:      Rate and Rhythm: Normal rate and regular rhythm.      Heart sounds: Normal heart sounds.   Pulmonary:      Effort: Pulmonary effort is normal.      Breath sounds: Normal breath sounds.   Abdominal:      General: Bowel sounds are normal.      Palpations: Abdomen is soft.   Musculoskeletal:         General: Normal range of motion.      Cervical back: Normal range of motion and neck supple.   Skin:     General: Skin is warm.   Neurological:      Mental Status: She is alert and oriented to person, place, and time.         ASA Score: II    Mallampati Score: II    Plan for Sedation: Moderate    Patient or Family History of Anesthesia problems : No    Any changes affecting the anesthesia assessment which may have changed since the initial assessment and H and P:  No       Data Review:    ECG:     Assessment:      Cataract OD.    Plan:    CE OD.         [1]   Social History  Tobacco Use    Smoking status: Former     Current  packs/day: 0.00     Average packs/day: 0.3 packs/day for 20.0 years (5.8 ttl pk-yrs)     Types: Cigarettes     Start date: 10/11/1999     Quit date: 10/11/2019     Years since quittin.5     Passive exposure: Never    Smokeless tobacco: Never    Tobacco comments:     Quit 2019.   Substance Use Topics    Alcohol use: Not Currently    Drug use: No

## 2025-05-15 ENCOUNTER — HOSPITAL ENCOUNTER (OUTPATIENT)
Facility: HOSPITAL | Age: 70
Discharge: HOME | End: 2025-05-15
Attending: OPHTHALMOLOGY | Admitting: OPHTHALMOLOGY
Payer: MEDICARE

## 2025-05-15 ENCOUNTER — ANESTHESIA (OUTPATIENT)
Dept: SURGERY | Facility: HOSPITAL | Age: 70
End: 2025-05-15
Payer: MEDICARE

## 2025-05-15 ENCOUNTER — ANESTHESIA EVENT (OUTPATIENT)
Dept: SURGERY | Facility: HOSPITAL | Age: 70
End: 2025-05-15
Payer: MEDICARE

## 2025-05-15 VITALS
BODY MASS INDEX: 29.01 KG/M2 | DIASTOLIC BLOOD PRESSURE: 87 MMHG | TEMPERATURE: 98 F | RESPIRATION RATE: 19 BRPM | SYSTOLIC BLOOD PRESSURE: 115 MMHG | WEIGHT: 169 LBS | OXYGEN SATURATION: 97 % | HEART RATE: 66 BPM

## 2025-05-15 DIAGNOSIS — H25.11 NUCLEAR SCLEROTIC CATARACT OF RIGHT EYE: Primary | ICD-10-CM

## 2025-05-15 PROCEDURE — 66984 XCAPSL CTRC RMVL W/O ECP: CPT | Mod: 79,RT,, | Performed by: OPHTHALMOLOGY

## 2025-05-15 PROCEDURE — 63600175 PHARM REV CODE 636 W HCPCS: Mod: JZ,TB | Performed by: OPHTHALMOLOGY

## 2025-05-15 PROCEDURE — 71000015 HC POSTOP RECOV 1ST HR: Performed by: OPHTHALMOLOGY

## 2025-05-15 PROCEDURE — 25000003 PHARM REV CODE 250: Performed by: OPHTHALMOLOGY

## 2025-05-15 PROCEDURE — 36000706: Performed by: OPHTHALMOLOGY

## 2025-05-15 PROCEDURE — V2632 POST CHMBR INTRAOCULAR LENS: HCPCS | Performed by: OPHTHALMOLOGY

## 2025-05-15 PROCEDURE — 63600175 PHARM REV CODE 636 W HCPCS

## 2025-05-15 PROCEDURE — 25000003 PHARM REV CODE 250

## 2025-05-15 PROCEDURE — 37000009 HC ANESTHESIA EA ADD 15 MINS: Performed by: OPHTHALMOLOGY

## 2025-05-15 PROCEDURE — 37000008 HC ANESTHESIA 1ST 15 MINUTES: Performed by: OPHTHALMOLOGY

## 2025-05-15 PROCEDURE — 36000707: Performed by: OPHTHALMOLOGY

## 2025-05-15 DEVICE — CLAREON ASPHERIC HYDROPHOBIC ACRYLIC IOL WITH THE AUTONOMEAUTOMATED PRE-LOADED DELIVERY SYSTEM
Type: IMPLANTABLE DEVICE | Site: EYE | Status: FUNCTIONAL
Brand: CLAREON™

## 2025-05-15 RX ORDER — HYDROCODONE BITARTRATE AND ACETAMINOPHEN 5; 325 MG/1; MG/1
1 TABLET ORAL EVERY 4 HOURS PRN
Refills: 0 | Status: DISCONTINUED | OUTPATIENT
Start: 2025-05-15 | End: 2025-05-15 | Stop reason: HOSPADM

## 2025-05-15 RX ORDER — OFLOXACIN 3 MG/ML
1 SOLUTION/ DROPS OPHTHALMIC
Status: COMPLETED | OUTPATIENT
Start: 2025-05-15 | End: 2025-05-15

## 2025-05-15 RX ORDER — MIDAZOLAM HYDROCHLORIDE 1 MG/ML
INJECTION INTRAMUSCULAR; INTRAVENOUS
Status: DISCONTINUED | OUTPATIENT
Start: 2025-05-15 | End: 2025-05-15

## 2025-05-15 RX ORDER — OFLOXACIN 3 MG/ML
SOLUTION/ DROPS OPHTHALMIC
Status: DISCONTINUED | OUTPATIENT
Start: 2025-05-15 | End: 2025-05-15 | Stop reason: HOSPADM

## 2025-05-15 RX ORDER — CYCLOP/TROP/PROPA/PHEN/KET/WAT 1-1-0.1%
1 DROPS (EA) OPHTHALMIC (EYE)
Status: COMPLETED | OUTPATIENT
Start: 2025-05-15 | End: 2025-05-15

## 2025-05-15 RX ORDER — PREDNISOLONE ACETATE 10 MG/ML
SUSPENSION/ DROPS OPHTHALMIC
Status: DISCONTINUED | OUTPATIENT
Start: 2025-05-15 | End: 2025-05-15 | Stop reason: HOSPADM

## 2025-05-15 RX ORDER — ACETAMINOPHEN 325 MG/1
650 TABLET ORAL EVERY 4 HOURS PRN
Status: DISCONTINUED | OUTPATIENT
Start: 2025-05-15 | End: 2025-05-15 | Stop reason: HOSPADM

## 2025-05-15 RX ORDER — SODIUM CHLORIDE 9 MG/ML
INJECTION, SOLUTION INTRAVENOUS CONTINUOUS
Status: DISCONTINUED | OUTPATIENT
Start: 2025-05-15 | End: 2025-05-15 | Stop reason: HOSPADM

## 2025-05-15 RX ORDER — LIDOCAINE HYDROCHLORIDE 20 MG/ML
INJECTION, SOLUTION INFILTRATION; PERINEURAL
Status: DISCONTINUED | OUTPATIENT
Start: 2025-05-15 | End: 2025-05-15 | Stop reason: HOSPADM

## 2025-05-15 RX ORDER — POVIDONE-IODINE 5 %
SOLUTION, NON-ORAL OPHTHALMIC (EYE)
Status: DISCONTINUED | OUTPATIENT
Start: 2025-05-15 | End: 2025-05-15 | Stop reason: HOSPADM

## 2025-05-15 RX ORDER — FENTANYL CITRATE 50 UG/ML
INJECTION, SOLUTION INTRAMUSCULAR; INTRAVENOUS
Status: DISCONTINUED | OUTPATIENT
Start: 2025-05-15 | End: 2025-05-15

## 2025-05-15 RX ADMIN — Medication 1 DROP: at 08:05

## 2025-05-15 RX ADMIN — FENTANYL CITRATE 50 MCG: 50 INJECTION, SOLUTION INTRAMUSCULAR; INTRAVENOUS at 10:05

## 2025-05-15 RX ADMIN — OFLOXACIN 1 DROP: 3 SOLUTION/ DROPS OPHTHALMIC at 08:05

## 2025-05-15 RX ADMIN — OFLOXACIN 1 DROP: 3 SOLUTION/ DROPS OPHTHALMIC at 07:05

## 2025-05-15 RX ADMIN — Medication 1 DROP: at 07:05

## 2025-05-15 RX ADMIN — MIDAZOLAM HYDROCHLORIDE 1 MG: 1 INJECTION INTRAMUSCULAR; INTRAVENOUS at 10:05

## 2025-05-15 RX ADMIN — SODIUM CHLORIDE: 0.9 INJECTION, SOLUTION INTRAVENOUS at 10:05

## 2025-05-15 NOTE — ANESTHESIA PREPROCEDURE EVALUATION
[facility]  Anesthesia Pre-Operative Evaluation         Patient Name: Liz Coto  YOB: 1955  MRN: [id number]    SUBJECTIVE:     Pre-operative evaluation for Procedure(s) (LRB):  PHACOEMULSIFICATION, CATARACT (Left)  INSERTION, IOL PROSTHESIS (Left)     05/15/2025    Liz Coto is a 69 y.o. female w/ a significant PMHx of CKD, ovarian cancer s/p chemotherapy and total hysterectomy/BSO.    Patient now presents for the above procedure(s).      LDA: None documented.    Prev airway: None documented.    Drips: None documented.    Problem List[1]    Review of patient's allergies indicates:  No Known Allergies    Current Outpatient Medications:  Current Medications[2]    Past Surgical History:   Procedure Laterality Date    BILATERAL SALPINGO-OOPHORECTOMY (BSO) N/A 11/04/2019    Procedure: SALPINGO-OOPHORECTOMY, BILATERAL;  Surgeon: Pérez Shukla MD;  Location: [facility] OR [address] [place] [address];  Service: OB/GYN;  Laterality: N/A;    CATARACT EXTRACTION      COLONOSCOPY N/A 09/29/2020    Procedure: COLONOSCOPY;  Surgeon: ANAHY Fong MD;  Location: [facility] ENDO (4TH FLR);  Service: Endoscopy;  Laterality: N/A;  9/26-covid [facility]-tb  9/28-confirmed [facility]-tb    DEBULKING OF TUMOR N/A 11/04/2019    Procedure: DEBULKING, NEOPLASM;  Surgeon: Pérez Shukla MD;  Location: [facility] OR [address] [place] [address];  Service: OB/GYN;  Laterality: N/A;    ECTOPIC PREGNANCY SURGERY      1981. thinks it was the left     INTRAOCULAR PROSTHESES INSERTION Left 05/01/2025    Procedure: INSERTION, IOL PROSTHESIS;  Surgeon: Kenney Courtney MD;  Location: [facility] OR;  Service: Ophthalmology;  Laterality: Left;    LYSIS OF ADHESIONS N/A 11/04/2019    Procedure: LYSIS, ADHESIONS;  Surgeon: Pérez Shukla MD;  Location: [facility] OR [address] [place] [address];  Service: OB/GYN;  Laterality: N/A;  Sigmoid    OMENTECTOMY N/A 11/04/2019    Procedure: OMENTECTOMY;  Surgeon: Pérez Shukla MD;  Location: [address];  Service: OB/GYN;   Laterality: N/A;    PARA-AORTIC LYMPHADENECTOMY N/A 11/04/2019    Procedure: LYMPHADENECTOMY, PARA-AORTIC;  Surgeon: Clarence Colmenares MD;  Location: [facility] OR [address] [place] [address];  Service: General;  Laterality: N/A;    PHACOEMULSIFICATION OF CATARACT Left 05/01/2025    Procedure: PHACOEMULSIFICATION, CATARACT;  Surgeon: Kenney Courtney MD;  Location: [facility];  Service: Ophthalmology;  Laterality: Left;  RN PHONE PREOP 4/21/2025  ARRIVAL 730 AM    TONSILLECTOMY      TOTAL ABDOMINAL HYSTERECTOMY N/A 11/04/2019    Procedure: HYSTERECTOMY, TOTAL, ABDOMINAL;  Surgeon: Pérez Shukla MD;  Location: [facility] OR [address] [place] [address];  Service: OB/GYN;  Laterality: N/A;       Social History[3]    OBJECTIVE:     Vital Signs Range (Last 24H):  Temp:  [36.9 °C (98.5 °F)]   Pulse:  [69]   Resp:  [16]   BP: (108)/(76)   SpO2:  [96 %]       Significant Labs:  Lab Results   Component Value Date    WBC 6.74 02/10/2025    HGB 13.7 02/10/2025    HCT 42.4 02/10/2025     02/10/2025    CHOL 240 (H) 02/10/2025    TRIG 80 02/10/2025    HDL 64 02/10/2025    ALT 11 02/10/2025    AST 19 02/10/2025     02/10/2025    K 4.2 02/10/2025     02/10/2025    CREATININE 1.2 02/10/2025    BUN 13 02/10/2025    CO2 27 02/10/2025    TSH 2.395 03/02/2020    INR 0.9 03/02/2020    HGBA1C 5.5 02/05/2024       Diagnostic Studies: No relevant studies.    EKG:   Results for orders placed or performed in visit on 11/08/21   IN OFFICE EKG 12-LEAD (to [place])    Collection Time: 11/08/21  9:03 AM    Narrative    Test Reason : R06.00,    Vent. Rate : 074 BPM     Atrial Rate : 074 BPM     P-R Int : 144 ms          QRS Dur : 076 ms      QT Int : 348 ms       P-R-T Axes : 066 045 053 degrees     QTc Int : 386 ms    Normal sinus rhythm  Nonspecific T wave abnormality Less prominent than previously  Abnormal ECG  When compared with ECG of 10-OCT-2019 15:49,  T wave inversion no longer evident in Inferior leads  Confirmed by DURGA LUONG MD ([id number]) on 11/9/2021 10:28:11  AM    Referred By: JAROCHO ONEIL           Confirmed By:DURGA LUONG MD       2D ECHO:  TTE:  No results found for this or any previous visit.    SULMA:  No results found for this or any previous visit.    ASSESSMENT/PLAN:           Pre-op Assessment    I have reviewed the Patient Summary Reports.     I have reviewed the Nursing Notes. I have reviewed the NPO Status.   I have reviewed the Medications.     Review of Systems  Anesthesia Hx:  No problems with previous Anesthesia                Social:  Non-Smoker, Social Alcohol Use       Hematology/Oncology:                      Current/Recent Cancer. (Ovarian cancer s/p total hysterectomy/BSO with chemotherapy)                Cardiovascular:  Cardiovascular Normal                                              Pulmonary:  Pulmonary Normal                       Renal/:  Chronic Renal Disease, CKD                Hepatic/GI:  Hepatic/GI Normal                    Neurological:  Neurology Normal                                          Physical Exam  General: Well nourished, Cooperative, Alert and Oriented    Airway:  Mallampati: II   Mouth Opening: Normal  TM Distance: Normal  Tongue: Normal  Neck ROM: Normal ROM    Dental:  Intact    Chest/Lungs:  Normal Respiratory Rate    Heart:  Rate: Normal  Rhythm: Regular Rhythm        Anesthesia Plan  Type of Anesthesia, risks & benefits discussed:    Anesthesia Type: MAC  Intra-op Monitoring Plan: Standard ASA Monitors  Post Op Pain Control Plan: multimodal analgesia  Informed Consent: Informed consent signed with the Patient and all parties understand the risks and agree with anesthesia plan.  All questions answered. Patient consented to blood products? No  ASA Score: 2    Ready For Surgery From Anesthesia Perspective.     .           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    Osteopenia    Chronic kidney disease, stage 3a    Nuclear sclerotic cataract of left eye   [2]   Current Facility-Administered Medications:     0.9% NaCl infusion, , Intravenous, Continuous, Kenney Courtney MD  [3]   Social History  Socioeconomic History    Marital status: Single    Number of children: 0   Tobacco Use    Smoking status: Former     Current packs/day: 0.00     Average packs/day: 0.3 packs/day for 20.0 years (5.8 ttl pk-yrs)     Types: Cigarettes     Start date: 10/11/1999     Quit date: 10/11/2019     Years since quittin.5     Passive exposure: Never    Smokeless tobacco: Never    Tobacco comments:     Quit 2019.   Substance and Sexual Activity    Alcohol use: Not Currently    Drug use: No    Sexual activity: Not Currently     Partners: Male     Birth control/protection: None, Post-menopausal     Social Drivers of Health     Financial Resource Strain: Low Risk  (2024)    Overall Financial Resource Strain (CARDIA)     Difficulty of Paying Living Expenses: Not hard at all   Food Insecurity: No Food Insecurity (2024)    Hunger Vital Sign     Worried About Running Out of Food in the Last Year: Never true     Ran Out of Food in the Last Year: Never true   Transportation Needs: No Transportation Needs (2024)    PRAPARE - Transportation     Lack of Transportation (Medical): No     Lack of Transportation (Non-Medical): No   Physical Activity: Sufficiently Active (2024)    Exercise Vital Sign     Days of Exercise per Week: 3 days     Minutes of Exercise per Session: 120 min   Stress: No Stress Concern Present (2024)    [employer] [place] [employer] - Occupational Stress Questionnaire     Feeling of Stress : Not at all   Housing Stability: Unknown  (12/13/2024)    Housing Stability Vital Sign     Unable to Pay for Housing in the Last Year: No     Homeless in the Last Year: No

## 2025-05-15 NOTE — ANESTHESIA POSTPROCEDURE EVALUATION
Anesthesia Post Evaluation    Patient: Liz Coto    Procedure(s) Performed: Procedure(s) (LRB):  PHACOEMULSIFICATION, CATARACT (Right)  INSERTION, IOL PROSTHESIS (Right)    Final Anesthesia Type: MAC      Patient location during evaluation: PACU  Patient participation: Yes- Able to Participate  Level of consciousness: awake and alert and oriented  Post-procedure vital signs: reviewed and stable  Pain management: adequate  Airway patency: patent    PONV status at discharge: No PONV  Anesthetic complications: no      Cardiovascular status: blood pressure returned to baseline and hemodynamically stable  Respiratory status: unassisted, spontaneous ventilation and room air  Hydration status: euvolemic  Follow-up not needed.              Vitals Value Taken Time   /87 05/15/25 10:51   Temp 36.5 °C (97.7 °F) 05/15/25 10:51   Pulse 66 05/15/25 10:51   Resp 19 05/15/25 10:51   SpO2 97 % 05/15/25 10:51         No case tracking events are documented in the log.      Pain/Caity Score: Caity Score: 10 (5/15/2025 11:25 AM)  Modified Caity Score: 20 (5/15/2025 11:25 AM)

## 2025-05-15 NOTE — TRANSFER OF CARE
Anesthesia Transfer of Care Note    Patient: Liz Coto    Procedure(s) Performed: Procedure(s) (LRB):  PHACOEMULSIFICATION, CATARACT (Right)  INSERTION, IOL PROSTHESIS (Right)    Patient location: [facility]    Anesthesia Type: MAC    Transport from OR: Transported from OR on room air with adequate spontaneous ventilation    Post pain: adequate analgesia    Post assessment: no apparent anesthetic complications and tolerated procedure well    Post vital signs: stable    Level of consciousness: awake, alert and oriented    Nausea/Vomiting: no nausea/vomiting    Complications: none    Transfer of care protocol was followed      Last vitals: Visit Vitals  /87 (BP Location: Right arm, Patient Position: Lying)   Pulse 66   Temp 36.5 °C (97.7 °F) (Oral)   Resp 19   Wt 76.7 kg (169 lb)   LMP  (LMP Unknown)   SpO2 97%   Breastfeeding No   BMI 29.01 kg/m²

## 2025-05-15 NOTE — DISCHARGE INSTRUCTIONS
Post Operative Instructions for   Cataract Surgery- [facility] [place]      SUNITA GRAHAM M.D.   OPHTHALMOLOGY               STARTING THE SAME DAY OF SURGERY:   Place one (1) drop of Prednisolone-Moxifloxacin-Bromfenac (shake bottle), into the operative eye three (3) time a day (Morning, Noon, and Nighttime).       You will use this drop for four (4) weeks following surgery.             1 DAY POST OPERATIVE APPOINTMENT:          Date:  May 16, 2025 - Friday Time: 10:30 AM     Location:  [facility]- [address] [place] [address]            RESTRICTIONS FOR SEVEN (7) DAYS FOLLOWING SURGERY:   DO NOT lift anything over 10 pounds.   DO NOT bend at the waist, only at the knees (keep head in upright position).    DO NOT rub your eye.    DO NOT get any water into the eye.    DO NOT wear any makeup, lotions, or creams on/around the eye.   Wear the protective eye shield you were given after surgery anytime you go to sleep.  You may remove the shield while awake.        No driving, drinking alcohol, or signing legal documents for the next 24 hours.         PLEASE NOTE:       You may take over the counter pain medication such as Tylenol or Ibuprofen as directed, if needed for pain.    ***If you experience severe pain, loss of vision, sudden onset of flashes and/or floaters,     IMMEDIATELY CALL Dr. Snell Office: [phone], AFTER HOURs: [phone] OR proceed to the EMERGENCY ROOM***.        Fall Prevention  Millions of people fall every year and injure themselves. You may have had anesthesia or sedation which may increase your risk of falling. You may have health issues that put you at an increased risk of falling.     Here are ways to reduce your risk of falling.    Make your home safe by keeping walkways clear of objects you may trip over.  Use non-slip pads under rugs. Do not use area rugs or small throw rugs.  Use non-slip mats in bathtubs and showers.  Install handrails and lights on  staircases.  Do not walk in poorly lit areas.  Do not stand on chairs or wobbly ladders.  Use caution when reaching overhead or looking upward. This position can cause a loss of balance.  Be sure your shoes fit properly, have non-slip bottoms and are in good condition.   Wear shoes both inside and out. Avoid going barefoot or wearing slippers.  Be cautious when going up and down stairs, curbs, and when walking on uneven sidewalks.  If your balance is poor, consider using a cane or walker.  If your fall was related to alcohol use, stop or limit alcohol intake.   If your fall was related to use of sleeping medicines, talk to your doctor about this. You may need to reduce your dosage at bedtime if you awaken during the night to go to the bathroom.    To reduce the need for nighttime bathroom trips:  Avoid drinking fluids for several hours before going to bed  Empty your bladder before going to bed  Men can keep a urinal at the bedside  Stay as active as you can. Balance, flexibility, strength, and endurance all come from exercise. They all play a role in preventing falls. Ask your healthcare provider which types of activity are right for you.  Get your vision checked on a regular basis.  If you have pets, know where they are before you stand up or walk so you don't trip over them.  Use night lights.

## 2025-05-15 NOTE — BRIEF OP NOTE
[facility] [place] - Surgery  Brief Operative Note    Surgery Date: 5/15/2025     Surgeons and Role:     * Kenney Courtney MD - Primary    Assisting Surgeon: None    Pre-op Diagnosis:  NS (nuclear sclerosis), right [H25.11]    Post-op Diagnosis:  Post-Op Diagnosis Codes:     * NS (nuclear sclerosis), right [H25.11]    Procedure(s) (LRB):  PHACOEMULSIFICATION, CATARACT (Right)  INSERTION, IOL PROSTHESIS (Right)    Anesthesia: Local MAC    Operative Findings:     Estimated Blood Loss: * No values recorded between 5/15/2025 10:07 AM and 5/15/2025 10:50 AM *         Specimens:   Specimen (24h ago, onward)      None            * No specimens in log *        Discharge Note    OUTCOME: Patient tolerated treatment/procedure well without complication and is now ready for discharge.    DISPOSITION: Home or Self Care    FINAL DIAGNOSIS:  Nuclear sclerotic cataract of right eye    FOLLOWUP: In clinic    DISCHARGE INSTRUCTIONS:    Discharge Procedure Orders   Other restrictions (specify):   Order Comments: No heavy lifting or bending for 1 week.

## 2025-05-15 NOTE — OP NOTE
Operative Date:  05/15/2025    Discharge Date:  05/15/2025    Discharge Patient Home    Report Title: Operative Note      SURGEON: Kenney Courtney MD     ASSISTANT:     PREOPERATIVE DIAGNOSIS: Visually significant NSC cataract,  Right Eye.    POSTOPERATIVE DIAGNOSIS: Visually-significant NSC cataract,  Right Eye.    PROCEDURE PERFORMED: Phacoemulsification of the cataract with posterior chamber intraocular lens Right Eye.    ANESTHESIA: Topical with MAC     COMPLICATIONS: None    ESTIMATED BLOOD LOSS: Minimal    INDICATIONS FOR PROCEDURE:   The patient is a pleasant 69 year old woman with increasing difficulties with activities of daily living secondary to a dense visually significant cataract in the Right Eye.  Discussions have been carried out with this patient concerning the options to surgery, risks, benefits and expectations.  The patient voiced good understanding and wished to proceed with the above procedure.    PROCEDURE IN DETAIL: The patient was brought to the operating room and received topical anesthetic to the eye and then was prepped and draped in the usual sterile fashion.  Using the Mcintyre ring and the guarded trish blade set at 0.37 mm, a partial thickness clear cornea incision was made temporally.  The paracentesis site was made at the twelve o'clock position.  Omidria was injected into the anterior chamber through the paracentesis.  Viscoat was then injected into the anterior chamber.  The eye was then reentered at the primary surgical site with a 2.4 mm keratome followed by continuous capsulotomy, hydrodissection, hydrodelineation and phacoemulsification of the cataract.  Residual cortical material was removed using automated irrigation-aspiration technique.  Healon was injected into the posterior chamber and a CNAOTO 22.0 diopter lens was placed in the bag without difficulty. Residual viscoelastic was removed using automated irrigation-aspiration technique. The eye was re-pressurized  using BSS solution and both the paracentesis site and the primary surgical site were demonstrated to be watertight at the end of the case with Weck--Mini manipulation.  One drop of Ofloxacin and one drop of Pred acetate 1% was applied to the Right Eye .The eye was closed, patched and a Swartz shield placed.  The patient was taken to the recovery room in good and stable condition.  The patient tolerated the procedure well.  The patient was instructed to refrain from any heavy lifting, bending, stooping or straining activities, discharged home  and to follow-up in the morning for routine postoperative care with Kenney Courtney MD.

## 2025-05-16 ENCOUNTER — OFFICE VISIT (OUTPATIENT)
Dept: OPHTHALMOLOGY | Facility: CLINIC | Age: 70
End: 2025-05-16
Payer: MEDICARE

## 2025-05-16 DIAGNOSIS — Z98.890 POST-OPERATIVE STATE: Primary | ICD-10-CM

## 2025-05-16 PROCEDURE — 1157F ADVNC CARE PLAN IN RCRD: CPT | Mod: CPTII,S$GLB,, | Performed by: OPHTHALMOLOGY

## 2025-05-16 PROCEDURE — 1160F RVW MEDS BY RX/DR IN RCRD: CPT | Mod: CPTII,S$GLB,, | Performed by: OPHTHALMOLOGY

## 2025-05-16 PROCEDURE — 3288F FALL RISK ASSESSMENT DOCD: CPT | Mod: CPTII,S$GLB,, | Performed by: OPHTHALMOLOGY

## 2025-05-16 PROCEDURE — 99999 PR PBB SHADOW E&M-EST. PATIENT-LVL II: CPT | Mod: PBBFAC,,, | Performed by: OPHTHALMOLOGY

## 2025-05-16 PROCEDURE — 1159F MED LIST DOCD IN RCRD: CPT | Mod: CPTII,S$GLB,, | Performed by: OPHTHALMOLOGY

## 2025-05-16 PROCEDURE — 1101F PT FALLS ASSESS-DOCD LE1/YR: CPT | Mod: CPTII,S$GLB,, | Performed by: OPHTHALMOLOGY

## 2025-05-16 PROCEDURE — 99024 POSTOP FOLLOW-UP VISIT: CPT | Mod: S$GLB,,, | Performed by: OPHTHALMOLOGY

## 2025-05-18 NOTE — PROGRESS NOTES
Subjective:       Patient ID: Liz Coto is a 69 y.o. female.    Chief Complaint: Post-op Evaluation    HPI    Here for 1 day S/p Phaco w/IOL OD 05-15-25    Eye meds: PMB OU TID     69 year old female says vision is clear in the right eye.  Denies ocular   pain. Denies itchy or scratchy eyes.   Last edited by Poppy Larson on 5/16/2025 10:38 AM.             Assessment:       1. Post-operative state        Plan:       S/p CE OU- Doing well.      CPM OU.  RTC 1 wk.

## 2025-05-19 ENCOUNTER — OFFICE VISIT (OUTPATIENT)
Dept: DERMATOLOGY | Facility: CLINIC | Age: 70
End: 2025-05-19
Payer: MEDICARE

## 2025-05-19 DIAGNOSIS — D36.7 CYST, DERMOID, LEG, RIGHT: ICD-10-CM

## 2025-05-19 PROCEDURE — 99999 PR PBB SHADOW E&M-EST. PATIENT-LVL III: CPT | Mod: PBBFAC,,, | Performed by: DERMATOLOGY

## 2025-05-19 PROCEDURE — 1101F PT FALLS ASSESS-DOCD LE1/YR: CPT | Mod: CPTII,S$GLB,, | Performed by: DERMATOLOGY

## 2025-05-19 PROCEDURE — 3288F FALL RISK ASSESSMENT DOCD: CPT | Mod: CPTII,S$GLB,, | Performed by: DERMATOLOGY

## 2025-05-19 PROCEDURE — 1160F RVW MEDS BY RX/DR IN RCRD: CPT | Mod: CPTII,S$GLB,, | Performed by: DERMATOLOGY

## 2025-05-19 PROCEDURE — 99202 OFFICE O/P NEW SF 15 MIN: CPT | Mod: S$GLB,,, | Performed by: DERMATOLOGY

## 2025-05-19 PROCEDURE — 1159F MED LIST DOCD IN RCRD: CPT | Mod: CPTII,S$GLB,, | Performed by: DERMATOLOGY

## 2025-05-19 PROCEDURE — 1126F AMNT PAIN NOTED NONE PRSNT: CPT | Mod: CPTII,S$GLB,, | Performed by: DERMATOLOGY

## 2025-05-19 PROCEDURE — 1157F ADVNC CARE PLAN IN RCRD: CPT | Mod: CPTII,S$GLB,, | Performed by: DERMATOLOGY

## 2025-05-19 NOTE — PATIENT INSTRUCTIONS

## 2025-05-19 NOTE — PROGRESS NOTES
Subjective:      Patient ID:  Liz Coto is a 69 y.o. female who presents for   Chief Complaint   Patient presents with    Mass     Right shin      Mass - Initial  Affected locations: right lower leg  Duration: 1 year  Signs / symptoms: growing  Severity: mild to moderate  Timing: constant  Aggravated by: nothing  Relieving factors/Treatments tried: nothing      Review of Systems   Constitutional: Negative.    HENT: Negative.     Respiratory: Negative.     Musculoskeletal: Negative.        Objective:   Physical Exam   Constitutional: She appears well-developed and well-nourished.   Neurological: She is alert and oriented to person, place, and time.   Psychiatric: She has a normal mood and affect.   Skin:   Areas Examined (abnormalities noted in diagram):   RLE Inspected            Diagram Legend     Erythematous scaling macule/papule c/w actinic keratosis       Vascular papule c/w angioma      Pigmented verrucoid papule/plaque c/w seborrheic keratosis      Yellow umbilicated papule c/w sebaceous hyperplasia      Irregularly shaped tan macule c/w lentigo     1-2 mm smooth white papules consistent with Milia      Movable subcutaneous cyst with punctum c/w epidermal inclusion cyst      Subcutaneous movable cyst c/w pilar cyst      Firm pink to brown papule c/w dermatofibroma      Pedunculated fleshy papule(s) c/w skin tag(s)      Evenly pigmented macule c/w junctional nevus     Mildly variegated pigmented, slightly irregular-bordered macule c/w mildly atypical nevus      Flesh colored to evenly pigmented papule c/w intradermal nevus       Pink pearly papule/plaque c/w basal cell carcinoma      Erythematous hyperkeratotic cursted plaque c/w SCC      Surgical scar with no sign of skin cancer recurrence      Open and closed comedones      Inflammatory papules and pustules      Verrucoid papule consistent consistent with wart     Erythematous eczematous patches and plaques     Dystrophic onycholytic nail  with subungual debris c/w onychomycosis     Umbilicated papule    Erythematous-base heme-crusted tan verrucoid plaque consistent with inflamed seborrheic keratosis     Erythematous Silvery Scaling Plaque c/w Psoriasis     See annotation      Assessment / Plan:        Cyst, dermoid, leg, right  Will schedule procedure appt for excision. Discussed risks (infection, scar, recurrence, etc) and benefits (less likely to recur than with I&D) of procedure with patient today.

## 2025-05-22 ENCOUNTER — OFFICE VISIT (OUTPATIENT)
Dept: OPHTHALMOLOGY | Facility: CLINIC | Age: 70
End: 2025-05-22
Payer: MEDICARE

## 2025-05-22 DIAGNOSIS — Z98.890 POST-OPERATIVE STATE: Primary | ICD-10-CM

## 2025-05-22 PROCEDURE — 99999 PR PBB SHADOW E&M-EST. PATIENT-LVL II: CPT | Mod: PBBFAC,,, | Performed by: OPHTHALMOLOGY

## 2025-05-22 NOTE — PROGRESS NOTES
Subjective:       Patient ID: Liz Coto is a 69 y.o. female.    Chief Complaint: Post-op Evaluation    HPI    Here for 1 week S/p Phaco w/IOL OD 05-15-25    Eye meds: PMB OU TID     69 year old female states vision is clear.  Denies ocular pain. Pt will   need readers for near. Denies itchy or scratchy eyes.   Last edited by Poppy Larson on 5/22/2025  1:18 PM.             Assessment:       1. Post-operative state        Plan:       S/p CE OU- Doing well.      CPM OU.  RTC 3 wks.

## 2025-06-12 ENCOUNTER — OFFICE VISIT (OUTPATIENT)
Dept: OPHTHALMOLOGY | Facility: CLINIC | Age: 70
End: 2025-06-12
Payer: MEDICARE

## 2025-06-12 DIAGNOSIS — Z98.890 POST-OPERATIVE STATE: Primary | ICD-10-CM

## 2025-06-12 DIAGNOSIS — H52.7 REFRACTIVE ERROR: ICD-10-CM

## 2025-06-12 PROCEDURE — 99999 PR PBB SHADOW E&M-EST. PATIENT-LVL II: CPT | Mod: PBBFAC,,, | Performed by: OPHTHALMOLOGY

## 2025-06-12 PROCEDURE — 1160F RVW MEDS BY RX/DR IN RCRD: CPT | Mod: CPTII,S$GLB,, | Performed by: OPHTHALMOLOGY

## 2025-06-12 PROCEDURE — 99024 POSTOP FOLLOW-UP VISIT: CPT | Mod: S$GLB,,, | Performed by: OPHTHALMOLOGY

## 2025-06-12 PROCEDURE — 1126F AMNT PAIN NOTED NONE PRSNT: CPT | Mod: CPTII,S$GLB,, | Performed by: OPHTHALMOLOGY

## 2025-06-12 PROCEDURE — 1159F MED LIST DOCD IN RCRD: CPT | Mod: CPTII,S$GLB,, | Performed by: OPHTHALMOLOGY

## 2025-06-12 PROCEDURE — 1157F ADVNC CARE PLAN IN RCRD: CPT | Mod: CPTII,S$GLB,, | Performed by: OPHTHALMOLOGY

## 2025-06-12 NOTE — PROGRESS NOTES
Subjective:       Patient ID: Liz Coto is a 69 y.o. female.    Chief Complaint: No chief complaint on file.    HPI    Here for 3 week OU  S/p Phaco w/IOL OD 05-15-25  S/p Phaco w/IOL OS 05-01-25    Eye meds: PMB OD TID     69 year old female here for 3 week post op. No eye pain. Vision fine,   Using OTC readers for small print.  Last edited by Meghana Casey MA on 6/12/2025  2:15 PM.             Assessment:       1. Post-operative state    2. Refractive error        Plan:       S/p CE OU- Doing well.  RE-Pt does not want MRx.      Readers.  RTC Dr Caputo in 6 mos.

## 2025-06-27 DIAGNOSIS — E55.9 VITAMIN D DEFICIENCY: ICD-10-CM

## 2025-06-27 RX ORDER — ERGOCALCIFEROL 1.25 MG/1
50000 CAPSULE ORAL
Qty: 12 CAPSULE | Refills: 1 | Status: SHIPPED | OUTPATIENT
Start: 2025-06-27

## 2025-06-27 NOTE — TELEPHONE ENCOUNTER
No care due was identified.  [facility] Embedded Care Due Messages. Reference number: [id number].   6/27/2025 7:02:44 AM CDT

## 2025-07-14 ENCOUNTER — LAB VISIT (OUTPATIENT)
Dept: LAB | Facility: HOSPITAL | Age: 70
End: 2025-07-14
Attending: INTERNAL MEDICINE
Payer: MEDICARE

## 2025-07-14 ENCOUNTER — PATIENT MESSAGE (OUTPATIENT)
Dept: NEPHROLOGY | Facility: CLINIC | Age: 70
End: 2025-07-14
Payer: MEDICARE

## 2025-07-14 DIAGNOSIS — N18.31 CHRONIC KIDNEY DISEASE, STAGE 3A: ICD-10-CM

## 2025-07-14 DIAGNOSIS — N18.31 CHRONIC KIDNEY DISEASE, STAGE 3A: Primary | ICD-10-CM

## 2025-07-14 LAB
BILIRUB UR QL STRIP.AUTO: NEGATIVE
CLARITY UR: CLEAR
COLOR UR AUTO: YELLOW
CREAT UR-MCNC: 244.8 MG/DL (ref 15–325)
GLUCOSE UR QL STRIP: NEGATIVE
HGB UR QL STRIP: NEGATIVE
KETONES UR QL STRIP: NEGATIVE
LEUKOCYTE ESTERASE UR QL STRIP: NEGATIVE
NITRITE UR QL STRIP: NEGATIVE
PH UR STRIP: 5 [PH]
PROT UR QL STRIP: NEGATIVE
PROT UR-MCNC: 9 MG/DL
PROT/CREAT UR: 0.04 MG/G{CREAT}
SP GR UR STRIP: 1.02
UROBILINOGEN UR STRIP-ACNC: NEGATIVE EU/DL

## 2025-07-14 PROCEDURE — 81003 URINALYSIS AUTO W/O SCOPE: CPT

## 2025-07-14 PROCEDURE — 82570 ASSAY OF URINE CREATININE: CPT

## 2025-07-15 LAB — HOLD SPECIMEN: NORMAL

## 2025-07-21 ENCOUNTER — OFFICE VISIT (OUTPATIENT)
Dept: NEPHROLOGY | Facility: CLINIC | Age: 70
End: 2025-07-21
Payer: MEDICARE

## 2025-07-21 VITALS
OXYGEN SATURATION: 96 % | HEIGHT: 64 IN | RESPIRATION RATE: 18 BRPM | DIASTOLIC BLOOD PRESSURE: 70 MMHG | SYSTOLIC BLOOD PRESSURE: 104 MMHG | WEIGHT: 179.88 LBS | BODY MASS INDEX: 30.71 KG/M2 | HEART RATE: 70 BPM

## 2025-07-21 DIAGNOSIS — Z82.69 FAMILY HISTORY OF SYSTEMIC LUPUS ERYTHEMATOSUS: ICD-10-CM

## 2025-07-21 DIAGNOSIS — N18.31 CHRONIC KIDNEY DISEASE, STAGE 3A: Primary | ICD-10-CM

## 2025-07-21 DIAGNOSIS — E88.09 HYPOALBUMINEMIA: ICD-10-CM

## 2025-07-21 PROCEDURE — 99204 OFFICE O/P NEW MOD 45 MIN: CPT | Mod: S$GLB,,, | Performed by: INTERNAL MEDICINE

## 2025-07-21 PROCEDURE — 3078F DIAST BP <80 MM HG: CPT | Mod: CPTII,S$GLB,, | Performed by: INTERNAL MEDICINE

## 2025-07-21 PROCEDURE — 3288F FALL RISK ASSESSMENT DOCD: CPT | Mod: CPTII,S$GLB,, | Performed by: INTERNAL MEDICINE

## 2025-07-21 PROCEDURE — 99999 PR PBB SHADOW E&M-EST. PATIENT-LVL IV: CPT | Mod: PBBFAC,,, | Performed by: INTERNAL MEDICINE

## 2025-07-21 PROCEDURE — 3074F SYST BP LT 130 MM HG: CPT | Mod: CPTII,S$GLB,, | Performed by: INTERNAL MEDICINE

## 2025-07-21 PROCEDURE — 1157F ADVNC CARE PLAN IN RCRD: CPT | Mod: CPTII,S$GLB,, | Performed by: INTERNAL MEDICINE

## 2025-07-21 PROCEDURE — 1160F RVW MEDS BY RX/DR IN RCRD: CPT | Mod: CPTII,S$GLB,, | Performed by: INTERNAL MEDICINE

## 2025-07-21 PROCEDURE — 1126F AMNT PAIN NOTED NONE PRSNT: CPT | Mod: CPTII,S$GLB,, | Performed by: INTERNAL MEDICINE

## 2025-07-21 PROCEDURE — 1101F PT FALLS ASSESS-DOCD LE1/YR: CPT | Mod: CPTII,S$GLB,, | Performed by: INTERNAL MEDICINE

## 2025-07-21 PROCEDURE — 3008F BODY MASS INDEX DOCD: CPT | Mod: CPTII,S$GLB,, | Performed by: INTERNAL MEDICINE

## 2025-07-21 PROCEDURE — 3066F NEPHROPATHY DOC TX: CPT | Mod: CPTII,S$GLB,, | Performed by: INTERNAL MEDICINE

## 2025-07-21 PROCEDURE — G2211 COMPLEX E/M VISIT ADD ON: HCPCS | Mod: S$GLB,,, | Performed by: INTERNAL MEDICINE

## 2025-07-21 PROCEDURE — 1159F MED LIST DOCD IN RCRD: CPT | Mod: CPTII,S$GLB,, | Performed by: INTERNAL MEDICINE

## 2025-07-21 NOTE — ASSESSMENT & PLAN NOTE
- baseline Cr appears to be ~1.1 with GFR 55%; unclear etiology. Cannot rule out contribution from chemotherapy in 2020  - Cr 1.1 --> 1.2 --> 1.3 this month; slowly worsening. GFR down to 45%.   - UPCR 0.04; negative. Not currently on any medications except for vitamin D. Also with low albumin despite absence of proteinuria  - renal US with smaller than expected kidneys for a patient without HTN  - recommended increasing water intake for goal 48-64oz/day. Low salt diet. Avoid NSAIDs  - ordered RITCHIE, C3, C4 given family h/o SLE, though absence of proteinuria makes LN unlikely  - also ordered cystatin C  - will order renal biopsy if renal function continues to decline   - close follow-up

## 2025-07-21 NOTE — ASSESSMENT & PLAN NOTE
- albumin 3.3. UPCR negative. Patient reports well-rounded diet; she denies diarrhea  - will trend

## 2025-07-21 NOTE — ASSESSMENT & PLAN NOTE
- patient without symptoms of SLE; UA negative for blood/protein  - checking RITCHIE, C3, C4 given worsening Cr

## 2025-07-21 NOTE — PATIENT INSTRUCTIONS
Increase your water intake for goal 48-64oz per day.     Low salt diet.    Please avoid all NSAIDs. This group of medications includes ibuprofen, Motrin, Aleve, Advil, naproxen, meloxicam (Mobic), ketorolac (Toradol), BC powders, Goody powders, etc.   In the short term, these medications can cause the small blood vessels in your kidneys to constrict, resulting in decreased blood flow to your kidneys as well as worsening of hypertension and swelling. In the long term, these medications can result in permanent damage to your kidneys.

## 2025-07-21 NOTE — PROGRESS NOTES
[facility]  [address] [facility] [address], [address]  [place]  [phone]    Referring Provider: Griselda Alegre, *  PCP: Griselda Alegre MD      HPI: Liz Coto is a 69 y.o. female with h/o fallopian tube adenocarcinoma (in remission) and family h/o kidney disease who is here for new patient evaluation of Chronic Kidney Disease  Prior records obtained and reviewed. Her Cr was 0.9-1.1 with GFR 55 - >60% from 2019-11/2024. Her Cr was 1.2 with GFR 50% in 2/2025 and Cr 1.3 with GFR 45% in 7/2025. No h/o proteinuria.   She denies h/o HTN, T2DM, gout, kidney stones, or significant NSAID use.   Her brother was diagnosed with CKD in 2016 but didn't undergo follow-up; he is now on dialysis. She cannot recall the etiology of his kidney failure.   Her father was diagnosed with SLE in his late 70s; she denies any skin rashes, photosensitivity, etc.   She was born at term, she is not a twin. No prior kidney surgeries.   She received carbo/taxol from [id number].   She denies use of OTC medications or herbal supplements.   She admits to poor water intake; she has recently cut back on soft drinks. She reports a well-rounded diet; she denies any diarrhea or vomiting. She does not use protein supplementation. No leg swelling.         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    Ovarian cancer        Past Surgical History:   Procedure Laterality Date    BILATERAL SALPINGO-OOPHORECTOMY (BSO) N/A 11/04/2019    Procedure: SALPINGO-OOPHORECTOMY, BILATERAL;  Surgeon: Pérez Shukla MD;  Location: [facility] OR [address] [place] [address];  Service: OB/GYN;  Laterality: N/A;    CATARACT EXTRACTION      COLONOSCOPY N/A 09/29/2020    Procedure: COLONOSCOPY;  Surgeon: ANAHY Rankin  MD Ajit;  Location: [facility] ENDO (4TH FLR);  Service: Endoscopy;  Laterality: N/A;  9/26-covid [facility]-tb  9/28-confirmed appt-tb    DEBULKING OF TUMOR N/A 11/04/2019    Procedure: DEBULKING, NEOPLASM;  Surgeon: Pérez Shukla MD;  Location: [facility] OR 2ND FLR;  Service: OB/GYN;  Laterality: N/A;    ECTOPIC PREGNANCY SURGERY      1981. thinks it was the left     INTRAOCULAR PROSTHESES INSERTION Left 05/01/2025    Procedure: INSERTION, IOL PROSTHESIS;  Surgeon: Kenney Courtney MD;  Location: [facility] OR;  Service: Ophthalmology;  Laterality: Left;    INTRAOCULAR PROSTHESES INSERTION Right 5/15/2025    Procedure: INSERTION, IOL PROSTHESIS;  Surgeon: Kenney Courtney MD;  Location: [facility] OR;  Service: Ophthalmology;  Laterality: Right;    LYSIS OF ADHESIONS N/A 11/04/2019    Procedure: LYSIS, ADHESIONS;  Surgeon: Pérez Shukla MD;  [address];  Service: OB/GYN;  Laterality: N/A;  Sigmoid    OMENTECTOMY N/A 11/04/2019    Procedure: OMENTECTOMY;  Surgeon: Pérez Shukla MD;  [facility];  Service: OB/GYN;  Laterality: N/A;    PARA-AORTIC LYMPHADENECTOMY N/A 11/04/2019    Procedure: LYMPHADENECTOMY, PARA-AORTIC;  Surgeon: Clarence Colmenares MD;  Location: [address];  Service: General;  Laterality: N/A;    PHACOEMULSIFICATION OF CATARACT Left 05/01/2025    Procedure: PHACOEMULSIFICATION, CATARACT;  Surgeon: Kenney Courtney MD;  Location: [facility] OR;  Service: Ophthalmology;  Laterality: Left;  RN PHONE PREOP 4/21/2025  ARRIVAL 730 AM    PHACOEMULSIFICATION OF CATARACT Right 5/15/2025    Procedure: PHACOEMULSIFICATION, CATARACT;  Surgeon: Kenney Courtney MD;  Location: [facility] OR;  Service: Ophthalmology;  Laterality: Right;  RN PHONE PREOP 5/5/2025   ARRIVAL 730 AM    TONSILLECTOMY      TOTAL ABDOMINAL HYSTERECTOMY N/A 11/04/2019    Procedure: HYSTERECTOMY, TOTAL, ABDOMINAL;  Surgeon: Pérez Shukla MD;  Location: [address] FLR;  Service: OB/GYN;   "Laterality: N/A;       Family History   Problem Relation Name Age of Onset    Thyroid disease Mother Nay     Breast cancer Mother Nay 40        unilat    Diabetes Father      Lupus Father      No Known Problems Sister Kathryn     No Known Problems Sister Fay     Prostate cancer Brother Lawrence         dx 64-65    Colon cancer Brother Lawrence         dx 64-65y    Dementia Maternal Aunt Marisol     Cancer Maternal Aunt Rose         "blood" (was COD)    Lung cancer Maternal Aunt Maureen     Prostate cancer Maternal Uncle Forrest 69    No Known Problems Maternal Uncle Hayden     No Known Problems Paternal Aunt      No Known Problems Paternal Uncle      Breast cancer Maternal Grandmother          age at dx? unilat    Colon cancer Maternal Grandfather          dx 90y    No Known Problems Paternal Grandmother      No Known Problems Paternal Grandfather      Breast cancer Maternal Cousin Willa 61        unilat (now s/p BRRM)    No Known Problems Other Blair     No Known Problems Other Josefa     No Known Problems Other Catarino     No Known Problems Other Jack     No Known Problems Other Clay     No Known Problems Other Ray     No Known Problems Other Edward     No Known Problems Other Susan     No Known Problems Other Violeta     Cancer Other Benson         type?    Breast cancer Other Sammie     Breast cancer Other Evelina     No Known Problems Other Santos     No Known Problems Other      No Known Problems Other      Cancer Other Reliance         pretty confident (type?)    No Known Problems Other Tabatha     No Known Problems Other Consuella     No Known Problems Other Haley     No Known Problems Other Jack     No Known Problems Other Sunny     No Known Problems Other n.     Uterine cancer Neg Hx      Anesthesia problems Neg Hx         Social History[1]      ROS:  Complete ROS otherwise negative except as indicated above.       Current Medications[2]      Vitals: Blood pressure 104/70, pulse " "70, resp. rate 18, height 5' 4" (1.626 m), weight 81.6 kg (179 lb 14.3 oz), SpO2 96%. Body mass index is 30.88 kg/m².    Physical Exam  Vitals reviewed.   Constitutional:       General: She is awake. She is not in acute distress.     Appearance: Normal appearance. She is well-developed.   HENT:      Head: Normocephalic and atraumatic.      Nose: Nose normal.      Mouth/Throat:      Mouth: Mucous membranes are moist.   Eyes:      Extraocular Movements: Extraocular movements intact.      Conjunctiva/sclera: Conjunctivae normal.   Pulmonary:      Effort: Pulmonary effort is normal.   Musculoskeletal:         General: No tenderness or signs of injury.      Right lower leg: No edema.      Left lower leg: No edema.   Skin:     General: Skin is warm and dry.      Findings: No erythema or rash.   Neurological:      General: No focal deficit present.      Mental Status: She is alert. Mental status is at baseline.   Psychiatric:         Mood and Affect: Mood normal.         Behavior: Behavior normal.           Labs/Imaging:  Sodium   Date Value Ref Range Status   07/14/2025 139 136 - 145 mmol/L Final   02/10/2025 137 136 - 145 mmol/L Final   11/05/2024 138 136 - 145 mmol/L Final   02/05/2024 138 136 - 145 mmol/L Final     Potassium   Date Value Ref Range Status   07/14/2025 4.3 3.5 - 5.1 mmol/L Final   02/10/2025 4.2 3.5 - 5.1 mmol/L Final   11/05/2024 3.9 3.5 - 5.1 mmol/L Final   02/05/2024 4.6 3.5 - 5.1 mmol/L Final     Chloride   Date Value Ref Range Status   07/14/2025 104 95 - 110 mmol/L Final   02/10/2025 104 95 - 110 mmol/L Final   11/05/2024 104 95 - 110 mmol/L Final   02/05/2024 104 95 - 110 mmol/L Final     CO2   Date Value Ref Range Status   07/14/2025 27 23 - 29 mmol/L Final   02/10/2025 27 23 - 29 mmol/L Final   11/05/2024 24 23 - 29 mmol/L Final   02/05/2024 27 23 - 29 mmol/L Final     BUN   Date Value Ref Range Status   07/14/2025 16 8 - 23 mg/dL Final   02/10/2025 13 8 - 23 mg/dL Final   11/05/2024 14 8 - 23 " mg/dL Final   02/05/2024 17 8 - 23 mg/dL Final     Creatinine   Date Value Ref Range Status   07/14/2025 1.3 0.5 - 1.4 mg/dL Final   02/10/2025 1.2 0.5 - 1.4 mg/dL Final   11/05/2024 1.1 0.5 - 1.4 mg/dL Final   02/05/2024 1.1 0.5 - 1.4 mg/dL Final     eGFR   Date Value Ref Range Status   07/14/2025 45 (L) >60 mL/min/1.73/m2 Final     Comment:     Estimated GFR calculated using the CKD-EPI creatinine (2021) equation.  Estimated GFR calculated using the CKD-EPI creatinine (2021) equation.   02/10/2025 50 (A) >60 mL/min/1.73 m^2 Final   11/05/2024 55.1 (A) >60 mL/min/1.73 m^2 Final   02/05/2024 55.4 (A) >60 mL/min/1.73 m^2 Final     Calcium   Date Value Ref Range Status   07/14/2025 9.4 8.7 - 10.5 mg/dL Final   02/10/2025 9.5 8.7 - 10.5 mg/dL Final   11/05/2024 9.3 8.7 - 10.5 mg/dL Final   02/05/2024 10.1 8.7 - 10.5 mg/dL Final     Phosphorus Level   Date Value Ref Range Status   07/14/2025 3.5 2.7 - 4.5 mg/dL Final     Albumin   Date Value Ref Range Status   07/14/2025 3.3 (L) 3.5 - 5.2 g/dL Final   02/10/2025 3.5 3.5 - 5.2 g/dL Final   02/05/2024 3.6 3.5 - 5.2 g/dL Final   01/05/2023 3.7 3.5 - 5.2 g/dL Final       PTH Intact   Date Value Ref Range Status   07/14/2025 56.5 9.0 - 77.0 pg/mL Final     Comment:     The testing method is chemiluminescent microparticle immunoassay manufactured by [employer] and performed on the [employer]ire or Proxible System. Values obtained with different assay manufactures for methods may be different and cannot be used interchangeably.     Vit D, 25-Hydroxy   Date Value Ref Range Status   02/10/2025 58 30 - 96 ng/mL Final     Comment:     Vitamin D deficiency.........<10 ng/mL                              Vitamin D insufficiency......10-29 ng/mL       Vitamin D sufficiency........> or equal to 30 ng/mL  Vitamin D toxicity............>100 ng/mL       Uric Acid   Date Value Ref Range Status   07/14/2025 5.9 (H) 2.4 - 5.7 mg/dL Final       Hemoglobin   Date Value Ref Range Status    02/10/2025 13.7 12.0 - 16.0 g/dL Final   02/05/2024 13.5 12.0 - 16.0 g/dL Final   01/05/2023 14.2 12.0 - 16.0 g/dL Final     HGB   Date Value Ref Range Status   07/14/2025 13.2 12.0 - 16.0 gm/dL Final       Urine Protein/Creatinine Ratio   Date Value Ref Range Status   07/14/2025 0.04 <=0.20 Final           Renal US: 2/14/25:   Right kidney: The right kidney measures 8.5 cm. No cortical thinning. No loss of corticomedullary distinction. Resistive index measures 0.68.  No mass. No renal stone. No hydronephrosis.     Left kidney: The left kidney measures 8.9 cm. No cortical thinning. No loss of corticomedullary distinction. Resistive index measures 0.67.  No mass. No renal stone. No hydronephrosis.       Impression:  Kidneys are mildly reduced in overall size but otherwise appear unremarkable.  No evidence for abnormal renal masses or hydronephrosis.         Impression/Plan:    Chronic kidney disease, stage 3a  - baseline Cr appears to be ~1.1 with GFR 55%; unclear etiology. Cannot rule out contribution from chemotherapy in 2020  - Cr 1.1 --> 1.2 --> 1.3 this month; slowly worsening. GFR down to 45%.   - UPCR 0.04; negative. Not currently on any medications except for vitamin D. Also with low albumin despite absence of proteinuria  - renal US with smaller than expected kidneys for a patient without HTN  - recommended increasing water intake for goal 48-64oz/day. Low salt diet. Avoid NSAIDs  - ordered RITCHIE, C3, C4 given family h/o SLE, though absence of proteinuria makes LN unlikely  - also ordered cystatin C  - will order renal biopsy if renal function continues to decline   - close follow-up    Hypoalbuminemia  - albumin 3.3. UPCR negative. Patient reports well-rounded diet; she denies diarrhea  - will trend    Family history of systemic lupus erythematosus  - patient without symptoms of SLE; UA negative for blood/protein  - checking RITCHIE, C3, C4 given worsening Cr      Visit today included increased complexity  associated with the care of the episodic problem hypoalbuminemia addressed and managing the longitudinal care of the patient due to the serious and/or complex managed problem(s) CKD.    Treatment options and plan were discussed with the patient and/or caregiver.   RTC 3 months.       Ifrah Nam MD  [facility]  [phone]         [1]   Social History  Tobacco Use    Smoking status: Former     Current packs/day: 0.00     Average packs/day: 0.3 packs/day for 20.0 years (5.8 ttl pk-yrs)     Types: Cigarettes     Start date: 10/11/1999     Quit date: 10/11/2019     Years since quittin.7     Passive exposure: Never    Smokeless tobacco: Never    Tobacco comments:     Quit 2019.   Substance Use Topics    Alcohol use: Not Currently    Drug use: No   [2]   Current Outpatient Medications:     ergocalciferol (ERGOCALCIFEROL) 50,000 unit Cap, Take 1 capsule by mouth once a week, Disp: 12 capsule, Rfl: 1    prednisoLONE-moxiflox-bromfen 1-0.5-0.075 % DrpS, Place 1 drop into the left eye 3 (three) times daily. (Patient not taking: Reported on 2025), Disp: 8 mL, Rfl: 2

## 2025-07-28 DIAGNOSIS — Z00.00 ENCOUNTER FOR MEDICARE ANNUAL WELLNESS EXAM: ICD-10-CM

## 2025-08-25 ENCOUNTER — TELEPHONE (OUTPATIENT)
Dept: DERMATOLOGY | Facility: CLINIC | Age: 70
End: 2025-08-25
Payer: MEDICARE

## 2025-08-29 ENCOUNTER — HOSPITAL ENCOUNTER (OUTPATIENT)
Dept: RADIOLOGY | Facility: HOSPITAL | Age: 70
Discharge: HOME | End: 2025-08-29
Attending: INTERNAL MEDICINE
Payer: MEDICARE

## 2025-09-03 ENCOUNTER — PROCEDURE VISIT (OUTPATIENT)
Dept: DERMATOLOGY | Facility: CLINIC | Age: 70
End: 2025-09-03
Payer: MEDICARE

## 2025-09-03 DIAGNOSIS — D48.5 NEOPLASM OF UNCERTAIN BEHAVIOR OF SKIN: Primary | ICD-10-CM

## (undated) DEVICE — GLOVE SENSICARE PI GRN 8

## (undated) DEVICE — SUT 1 48IN PDS II VIO MONO

## (undated) DEVICE — DRESSING ADH ISLAND 3.6 X 14

## (undated) DEVICE — SUT CTD VICRYL VIL BR SH 27

## (undated) DEVICE — SYR LUER LOCK 1CC

## (undated) DEVICE — SYR 10CC LUER LOCK

## (undated) DEVICE — NDL HYPO STD REG BVL 18GX1.5IN

## (undated) DEVICE — SOL WATER STERILE IRR 500ML

## (undated) DEVICE — LUBRICANT SURGILUBE 2 OZ

## (undated) DEVICE — SEE MEDLINE ITEM 157148

## (undated) DEVICE — SHEILD & GARTERS FOX METAL EYE

## (undated) DEVICE — SPONGE LAP 18X18 PREWASHED

## (undated) DEVICE — SYR 3CC LUER LOC

## (undated) DEVICE — CARTRIDGE LENS D

## (undated) DEVICE — GOWN NONREINF SET-IN SLV XL

## (undated) DEVICE — APPLICATOR CHLORAPREP ORN 26ML

## (undated) DEVICE — ELECTRODE REM PLYHSV RETURN 9

## (undated) DEVICE — GLOVE BIOGEL ECLIPSE SZ 6.5

## (undated) DEVICE — DEVICE ENSEAL X1 LARGE JAW

## (undated) DEVICE — LOOP VESSEL YELLOW MAXI

## (undated) DEVICE — STAPLER GIA HANDLE STD

## (undated) DEVICE — SUT 2/0 54IN COATED VICRYL

## (undated) DEVICE — NDL 20GX1-1/2IN IB

## (undated) DEVICE — SEE MEDLINE ITEM 154981

## (undated) DEVICE — CLOSURE SKIN STERI STRIP 1/2X4

## (undated) DEVICE — SEE MEDLINE ITEM 157181

## (undated) DEVICE — HEMOSTAT SURGICEL NU-KNIT 6X9

## (undated) DEVICE — KIT CUSTOM BASIC EYE ST/MEA

## (undated) DEVICE — GLOVE SENSICARE PI GRN 6.5

## (undated) DEVICE — SUT MCRYL PLUS 4-0 PS2 27IN

## (undated) DEVICE — SYR 30CC LUER LOCK

## (undated) DEVICE — DRESSING ABSRBNT ISLAND 3.6X8

## (undated) DEVICE — KIT EYE PIC PACK WB

## (undated) DEVICE — SEE MEDLINE ITEM 146417

## (undated) DEVICE — SET DECANTER MEDICHOICE

## (undated) DEVICE — TRAY FOLEY 16FR INFECTION CONT

## (undated) DEVICE — SUT CTD VICRYL VIL BR CR/SH

## (undated) DEVICE — CLIPPER BLADE MOD 4406 (CAREF)

## (undated) DEVICE — STAPLER ENDO GIA 60MM MED THCK

## (undated) DEVICE — GLOVE SENSICARE PI MICRO 7.5

## (undated) DEVICE — SEE MEDLINE ITEM 156902

## (undated) DEVICE — SUT SILK 3-0 SH 18IN BLACK

## (undated) DEVICE — SEE MEDLINE ITEM 152622

## (undated) DEVICE — SUT 0 54IN COATED VICRYL U

## (undated) DEVICE — SUT CTD VICRYL 0 VIL BR/CT

## (undated) DEVICE — DRAPE STERI INSTRUMENT 1018

## (undated) DEVICE — SEE MEDLINE ITEM 157117

## (undated) DEVICE — LEGGINGS 48X31 INCH